# Patient Record
Sex: MALE | Race: WHITE | NOT HISPANIC OR LATINO | Employment: OTHER | URBAN - NONMETROPOLITAN AREA
[De-identification: names, ages, dates, MRNs, and addresses within clinical notes are randomized per-mention and may not be internally consistent; named-entity substitution may affect disease eponyms.]

---

## 2023-01-28 PROBLEM — I25.10 CAD (CORONARY ARTERY DISEASE): Status: ACTIVE | Noted: 2023-01-28

## 2023-01-28 PROBLEM — I10 BENIGN HYPERTENSION: Status: ACTIVE | Noted: 2023-01-28

## 2023-01-28 PROBLEM — N52.9 ERECTILE DYSFUNCTION: Status: ACTIVE | Noted: 2023-01-28

## 2023-01-28 PROBLEM — E78.00 HYPERCHOLESTEROLEMIA: Status: ACTIVE | Noted: 2023-01-28

## 2023-01-28 PROBLEM — M26.642 ARTHRITIS OF LEFT TEMPOROMANDIBULAR JOINT: Status: ACTIVE | Noted: 2023-01-28

## 2023-01-28 PROBLEM — F32.A DEPRESSION: Status: ACTIVE | Noted: 2023-01-28

## 2023-01-28 PROBLEM — E55.9 VITAMIN D DEFICIENCY: Status: ACTIVE | Noted: 2023-01-28

## 2023-01-28 PROBLEM — F32.5 DEPRESSION, MAJOR, IN REMISSION (CMS-HCC): Status: ACTIVE | Noted: 2023-01-28

## 2023-01-28 PROBLEM — E03.9 HYPOTHYROID: Status: ACTIVE | Noted: 2023-01-28

## 2023-01-28 PROBLEM — K13.0 LIP LESION: Status: ACTIVE | Noted: 2023-01-28

## 2023-01-28 PROBLEM — R55 VASOVAGAL EPISODE: Status: ACTIVE | Noted: 2023-01-28

## 2023-01-28 PROBLEM — L40.50 PSORIATIC ARTHROPATHY (MULTI): Status: ACTIVE | Noted: 2023-01-28

## 2023-01-28 PROBLEM — D69.1 ABNORMAL PLATELETS (MULTI): Status: ACTIVE | Noted: 2023-01-28

## 2023-01-28 PROBLEM — S61.419A HAND LACERATION: Status: ACTIVE | Noted: 2023-01-28

## 2023-01-28 PROBLEM — D64.9 ANEMIA: Status: ACTIVE | Noted: 2023-01-28

## 2023-01-28 RX ORDER — LEVOTHYROXINE SODIUM 100 UG/1
100 TABLET ORAL DAILY
COMMUNITY
Start: 2018-07-16 | End: 2023-09-28 | Stop reason: SDUPTHER

## 2023-01-28 RX ORDER — IXEKIZUMAB 80 MG/ML
80 INJECTION, SOLUTION SUBCUTANEOUS
COMMUNITY
Start: 2018-05-29

## 2023-01-28 RX ORDER — CLOPIDOGREL BISULFATE 75 MG/1
75 TABLET ORAL DAILY
COMMUNITY
Start: 2014-01-21 | End: 2024-04-26 | Stop reason: SDUPTHER

## 2023-01-28 RX ORDER — METOPROLOL SUCCINATE 25 MG/1
25 TABLET, EXTENDED RELEASE ORAL DAILY
COMMUNITY
Start: 2014-01-21

## 2023-01-28 RX ORDER — LISINOPRIL 2.5 MG/1
2.5 TABLET ORAL DAILY
COMMUNITY
Start: 2016-06-10 | End: 2024-04-29 | Stop reason: HOSPADM

## 2023-01-28 RX ORDER — ASPIRIN 81 MG/1
81 TABLET ORAL DAILY
COMMUNITY
Start: 2014-01-21

## 2023-01-28 RX ORDER — ROSUVASTATIN CALCIUM 20 MG/1
20 TABLET, COATED ORAL DAILY
COMMUNITY
Start: 2014-01-21 | End: 2024-05-02 | Stop reason: ALTCHOICE

## 2023-01-28 RX ORDER — TADALAFIL 20 MG/1
20 TABLET ORAL
COMMUNITY
Start: 2020-02-12 | End: 2023-06-13 | Stop reason: ALTCHOICE

## 2023-01-28 RX ORDER — CITALOPRAM 20 MG/1
20 TABLET, FILM COATED ORAL DAILY
COMMUNITY
Start: 2014-01-15 | End: 2023-08-28

## 2023-01-30 PROBLEM — E66.3 OVERWEIGHT WITH BODY MASS INDEX (BMI) OF 27 TO 27.9 IN ADULT: Status: ACTIVE | Noted: 2023-01-30

## 2023-03-13 ENCOUNTER — OFFICE VISIT (OUTPATIENT)
Dept: PRIMARY CARE | Facility: CLINIC | Age: 68
End: 2023-03-13
Payer: MEDICARE

## 2023-03-13 VITALS
WEIGHT: 175 LBS | HEART RATE: 65 BPM | DIASTOLIC BLOOD PRESSURE: 78 MMHG | BODY MASS INDEX: 25.05 KG/M2 | TEMPERATURE: 97.1 F | HEIGHT: 70 IN | OXYGEN SATURATION: 98 % | SYSTOLIC BLOOD PRESSURE: 124 MMHG

## 2023-03-13 DIAGNOSIS — D69.1 ABNORMAL PLATELETS (MULTI): Chronic | ICD-10-CM

## 2023-03-13 DIAGNOSIS — F32.5 DEPRESSION, MAJOR, IN REMISSION (CMS-HCC): ICD-10-CM

## 2023-03-13 DIAGNOSIS — I25.10 CORONARY ARTERY DISEASE INVOLVING NATIVE HEART, UNSPECIFIED VESSEL OR LESION TYPE, UNSPECIFIED WHETHER ANGINA PRESENT: Chronic | ICD-10-CM

## 2023-03-13 DIAGNOSIS — E03.9 ACQUIRED HYPOTHYROIDISM: ICD-10-CM

## 2023-03-13 DIAGNOSIS — E55.9 VITAMIN D DEFICIENCY: ICD-10-CM

## 2023-03-13 DIAGNOSIS — Z12.11 SCREEN FOR COLON CANCER: ICD-10-CM

## 2023-03-13 DIAGNOSIS — I10 BENIGN HYPERTENSION: Chronic | ICD-10-CM

## 2023-03-13 DIAGNOSIS — Z00.00 MEDICARE ANNUAL WELLNESS VISIT, SUBSEQUENT: Primary | Chronic | ICD-10-CM

## 2023-03-13 DIAGNOSIS — Z00.00 ROUTINE GENERAL MEDICAL EXAMINATION AT HEALTH CARE FACILITY: Chronic | ICD-10-CM

## 2023-03-13 PROBLEM — F32.A DEPRESSION: Status: RESOLVED | Noted: 2023-01-28 | Resolved: 2023-03-13

## 2023-03-13 PROBLEM — E78.00 HYPERCHOLESTEROLEMIA: Status: RESOLVED | Noted: 2023-01-28 | Resolved: 2023-03-13

## 2023-03-13 PROCEDURE — 3078F DIAST BP <80 MM HG: CPT | Performed by: INTERNAL MEDICINE

## 2023-03-13 PROCEDURE — 3074F SYST BP LT 130 MM HG: CPT | Performed by: INTERNAL MEDICINE

## 2023-03-13 PROCEDURE — 1036F TOBACCO NON-USER: CPT | Performed by: INTERNAL MEDICINE

## 2023-03-13 PROCEDURE — 99214 OFFICE O/P EST MOD 30 MIN: CPT | Performed by: INTERNAL MEDICINE

## 2023-03-13 PROCEDURE — 1170F FXNL STATUS ASSESSED: CPT | Performed by: INTERNAL MEDICINE

## 2023-03-13 PROCEDURE — G0439 PPPS, SUBSEQ VISIT: HCPCS | Performed by: INTERNAL MEDICINE

## 2023-03-13 PROCEDURE — G0444 DEPRESSION SCREEN ANNUAL: HCPCS | Performed by: INTERNAL MEDICINE

## 2023-03-13 ASSESSMENT — LIFESTYLE VARIABLES
HAS A RELATIVE, FRIEND, DOCTOR, OR ANOTHER HEALTH PROFESSIONAL EXPRESSED CONCERN ABOUT YOUR DRINKING OR SUGGESTED YOU CUT DOWN: NO
HOW OFTEN DURING THE LAST YEAR HAVE YOU HAD A FEELING OF GUILT OR REMORSE AFTER DRINKING: NEVER
HOW OFTEN DURING THE LAST YEAR HAVE YOU BEEN UNABLE TO REMEMBER WHAT HAPPENED THE NIGHT BEFORE BECAUSE YOU HAD BEEN DRINKING: NEVER
HOW OFTEN DURING THE LAST YEAR HAVE YOU HAD A FEELING OF GUILT OR REMORSE AFTER DRINKING: NEVER
HOW MANY STANDARD DRINKS CONTAINING ALCOHOL DO YOU HAVE ON A TYPICAL DAY: 1 OR 2
HOW OFTEN DURING THE LAST YEAR HAVE YOU FAILED TO DO WHAT WAS NORMALLY EXPECTED FROM YOU BECAUSE OF DRINKING: NEVER
AUDIT TOTAL SCORE: 2
HAVE YOU OR SOMEONE ELSE BEEN INJURED AS A RESULT OF YOUR DRINKING: NO
HAVE YOU OR SOMEONE ELSE BEEN INJURED AS A RESULT OF YOUR DRINKING: NO
HOW OFTEN DURING THE LAST YEAR HAVE YOU FOUND THAT YOU WERE NOT ABLE TO STOP DRINKING ONCE YOU HAD STARTED: NEVER
HOW OFTEN DURING THE LAST YEAR HAVE YOU BEEN UNABLE TO REMEMBER WHAT HAPPENED THE NIGHT BEFORE BECAUSE YOU HAD BEEN DRINKING: NEVER
HOW MANY STANDARD DRINKS CONTAINING ALCOHOL DO YOU HAVE ON A TYPICAL DAY: 1 OR 2
HOW OFTEN DO YOU HAVE SIX OR MORE DRINKS ON ONE OCCASION: NEVER
HOW OFTEN DO YOU HAVE A DRINK CONTAINING ALCOHOL: 2-4 TIMES A MONTH
HOW OFTEN DURING THE LAST YEAR HAVE YOU FAILED TO DO WHAT WAS NORMALLY EXPECTED FROM YOU BECAUSE OF DRINKING: NEVER
HOW OFTEN DURING THE LAST YEAR HAVE YOU NEEDED AN ALCOHOLIC DRINK FIRST THING IN THE MORNING TO GET YOURSELF GOING AFTER A NIGHT OF HEAVY DRINKING: NEVER
HOW OFTEN DO YOU HAVE A DRINK CONTAINING ALCOHOL: 2-4 TIMES A MONTH
HAS A RELATIVE, FRIEND, DOCTOR, OR ANOTHER HEALTH PROFESSIONAL EXPRESSED CONCERN ABOUT YOUR DRINKING OR SUGGESTED YOU CUT DOWN: NO
AUDIT-C TOTAL SCORE: 2
SKIP TO QUESTIONS 9-10: 1
HOW OFTEN DURING THE LAST YEAR HAVE YOU FOUND THAT YOU WERE NOT ABLE TO STOP DRINKING ONCE YOU HAD STARTED: NEVER
HOW OFTEN DO YOU HAVE SIX OR MORE DRINKS ON ONE OCCASION: NEVER
SKIP TO QUESTIONS 9-10: 1
AUDIT-C TOTAL SCORE: 2
HOW OFTEN DURING THE LAST YEAR HAVE YOU NEEDED AN ALCOHOLIC DRINK FIRST THING IN THE MORNING TO GET YOURSELF GOING AFTER A NIGHT OF HEAVY DRINKING: NEVER
AUDIT TOTAL SCORE: 2

## 2023-03-13 ASSESSMENT — ACTIVITIES OF DAILY LIVING (ADL)
TAKING_MEDICATION: INDEPENDENT
DRESSING: INDEPENDENT
BATHING: INDEPENDENT
DOING_HOUSEWORK: INDEPENDENT
MANAGING_FINANCES: INDEPENDENT
GROCERY_SHOPPING: INDEPENDENT

## 2023-03-13 ASSESSMENT — PATIENT HEALTH QUESTIONNAIRE - PHQ9
SUM OF ALL RESPONSES TO PHQ9 QUESTIONS 1 AND 2: 0
1. LITTLE INTEREST OR PLEASURE IN DOING THINGS: NOT AT ALL
2. FEELING DOWN, DEPRESSED OR HOPELESS: NOT AT ALL

## 2023-03-13 ASSESSMENT — ENCOUNTER SYMPTOMS
OCCASIONAL FEELINGS OF UNSTEADINESS: 0
LOSS OF SENSATION IN FEET: 0
DEPRESSION: 0

## 2023-03-13 NOTE — PROGRESS NOTES
"Subjective   Reason for Visit: Aamir Grossman is an 67 y.o. male here for a Medicare Wellness visit.               I spent 15 minutes obtaining and discussing depression screening using PHQ-2 questions with results documented in the chart.  -        Patient Self Assessment of Health Status  Patient Self Assessment: Excellent    Nutrition and Exercise  Current Diet: Well Balanced Diet  Adequate Fluid Intake: Yes  Caffeine: Yes  Exercise Frequency: Regularly    Functional Ability/Level of Safety  Cognitive Impairment Observed: No cognitive impairment observed  Cognitive Impairment Reported: No cognitive impairment reported by patient or family    Home Safety Risk Factors: None    Patient Care Team:  Lenora Cobb MD as PCP - General  Len[facility] Cobb MD [facility] PCP - [facility]     Review of Systems    Objective   Vitals:  /78   Pulse 65   Temp 36.2 °C (97.1 °F)   Ht 1.778 m (5' 10\")   Wt 79.4 kg (175 lb)   SpO2 98%   BMI 25.11 kg/m²       Physical Exam    Assessment/Plan   Problem List Items Addressed This Visit          Circulatory    Benign hypertension    Current Assessment & Plan     Controlled , continue current meds           CAD (coronary artery disease)    Current Assessment & Plan     Controlled , continue current meds              Endocrine/Metabolic    Acquired hypothyroidism    Vitamin D deficiency    Current Assessment & Plan     Controlled , continue current meds              Hematologic    Abnormal platelets (CMS/HCC)    Current Assessment & Plan     Controlled no change continue monitoring            Other    Depression, major, in remission (CMS/HCC)    Screen for colon cancer - Primary    Current Assessment & Plan     ordered         Relevant Orders    Referral to General Surgery     Other Visit Diagnoses       Routine general medical examination at health care facility  (Chronic)       Relevant Orders    1 Year Follow Up In Primary Care - Wellness Exam         spent 15 " minutes obtaining and discussing depression screening using PHQ-2 questions with results documented in the chart.  -

## 2023-06-13 ENCOUNTER — OFFICE VISIT (OUTPATIENT)
Dept: PRIMARY CARE | Facility: CLINIC | Age: 68
End: 2023-06-13
Payer: MEDICARE

## 2023-06-13 VITALS
WEIGHT: 172.8 LBS | TEMPERATURE: 97.2 F | HEART RATE: 51 BPM | SYSTOLIC BLOOD PRESSURE: 124 MMHG | BODY MASS INDEX: 24.79 KG/M2 | OXYGEN SATURATION: 97 % | DIASTOLIC BLOOD PRESSURE: 72 MMHG

## 2023-06-13 DIAGNOSIS — E03.9 ACQUIRED HYPOTHYROIDISM: ICD-10-CM

## 2023-06-13 DIAGNOSIS — E55.9 VITAMIN D DEFICIENCY, UNSPECIFIED: ICD-10-CM

## 2023-06-13 DIAGNOSIS — E53.8 VITAMIN B12 DEFICIENCY: ICD-10-CM

## 2023-06-13 DIAGNOSIS — L40.50 PSORIATIC ARTHROPATHY (MULTI): ICD-10-CM

## 2023-06-13 DIAGNOSIS — R53.83 OTHER FATIGUE: ICD-10-CM

## 2023-06-13 DIAGNOSIS — I10 HYPERTENSION, UNSPECIFIED TYPE: ICD-10-CM

## 2023-06-13 DIAGNOSIS — M67.912 TENDINOPATHY OF LEFT ROTATOR CUFF: ICD-10-CM

## 2023-06-13 DIAGNOSIS — I10 BENIGN HYPERTENSION: Primary | ICD-10-CM

## 2023-06-13 DIAGNOSIS — Z79.899 HIGH RISK MEDICATION USE: ICD-10-CM

## 2023-06-13 DIAGNOSIS — Z12.5 SCREENING FOR PROSTATE CANCER: ICD-10-CM

## 2023-06-13 DIAGNOSIS — F32.5 DEPRESSION, MAJOR, IN REMISSION (CMS-HCC): ICD-10-CM

## 2023-06-13 DIAGNOSIS — E78.00 HYPERCHOLESTEREMIA: ICD-10-CM

## 2023-06-13 DIAGNOSIS — I25.10 CORONARY ARTERY DISEASE INVOLVING NATIVE HEART, UNSPECIFIED VESSEL OR LESION TYPE, UNSPECIFIED WHETHER ANGINA PRESENT: ICD-10-CM

## 2023-06-13 PROCEDURE — 99214 OFFICE O/P EST MOD 30 MIN: CPT | Performed by: INTERNAL MEDICINE

## 2023-06-13 PROCEDURE — 3078F DIAST BP <80 MM HG: CPT | Performed by: INTERNAL MEDICINE

## 2023-06-13 PROCEDURE — 3074F SYST BP LT 130 MM HG: CPT | Performed by: INTERNAL MEDICINE

## 2023-06-13 PROCEDURE — 1159F MED LIST DOCD IN RCRD: CPT | Performed by: INTERNAL MEDICINE

## 2023-06-13 PROCEDURE — 1036F TOBACCO NON-USER: CPT | Performed by: INTERNAL MEDICINE

## 2023-06-13 PROCEDURE — 1160F RVW MEDS BY RX/DR IN RCRD: CPT | Performed by: INTERNAL MEDICINE

## 2023-06-13 RX ORDER — AMOXICILLIN 500 MG/1
500 CAPSULE ORAL DAILY
COMMUNITY
Start: 2023-06-07 | End: 2023-06-21 | Stop reason: ALTCHOICE

## 2023-06-13 RX ORDER — CLOBETASOL PROPIONATE 0.5 MG/G
OINTMENT TOPICAL
COMMUNITY
Start: 2023-05-08

## 2023-06-13 ASSESSMENT — ENCOUNTER SYMPTOMS
DIZZINESS: 0
BLOOD IN STOOL: 0
WHEEZING: 0
PALPITATIONS: 0
FATIGUE: 0
UNEXPECTED WEIGHT CHANGE: 0
DIARRHEA: 0
SINUS PAIN: 0
FEVER: 0
ABDOMINAL PAIN: 0
HEADACHES: 0
DIFFICULTY URINATING: 0
BRUISES/BLEEDS EASILY: 0
HYPERTENSION: 1
PAIN: 1
SORE THROAT: 0
ARTHRALGIAS: 1
COUGH: 0

## 2023-06-13 NOTE — PROGRESS NOTES
Subjective   Patient ID: Aamir Grossman is a 67 y.o. male who presents for Hypertension, Hypothyroidism, Hyperlipidemia, and Pain (Left upper chest/shoulder region comes and goes ).    -History of coronary artery disease compensated no shortness of breath  - History of left shoulder rotator cuff surgery and chronic clavicular head dislocation now having left-sided pectoral pain radiating to the shoulder usually at rest no relation to exertion  Musculoskeletal pain patient reassured to continue monitoring follow-up Dr Lewis orthopedic surgery as recommended  - Hypercholesterolemia controlled  -Hypothyroid compensated follow-up thyroid function text results next appointment  - BPH symptoms are controlled to continue with current medication    Hypertension  Associated symptoms include chest pain (Left-sided pectoral pain at rest). Pertinent negatives include no headaches or palpitations.   Hyperlipidemia  Associated symptoms include chest pain (Left-sided pectoral pain at rest).   Pain  Associated symptoms include chest pain (Left-sided pectoral pain at rest). Pertinent negatives include no abdominal pain, diarrhea, fatigue, fever, headaches, rash or wheezing.          Review of Systems   Constitutional:  Negative for fatigue, fever and unexpected weight change.   HENT:  Negative for congestion, ear discharge, ear pain, mouth sores, sinus pain and sore throat.    Eyes:  Negative for visual disturbance.   Respiratory:  Negative for cough and wheezing.    Cardiovascular:  Positive for chest pain (Left-sided pectoral pain at rest). Negative for palpitations and leg swelling.   Gastrointestinal:  Negative for abdominal pain, blood in stool and diarrhea.   Genitourinary:  Negative for difficulty urinating.   Musculoskeletal:  Positive for arthralgias.   Skin:  Negative for rash.   Neurological:  Negative for dizziness and headaches.   Hematological:  Does not bruise/bleed easily.   Psychiatric/Behavioral:  Negative for  "behavioral problems.    All other systems reviewed and are negative.      Objective   No results found for: \"HGBA1C\"   /72   Pulse 51   Temp 36.2 °C (97.2 °F)   Wt 78.4 kg (172 lb 12.8 oz)   SpO2 97%   BMI 24.79 kg/m²   This SmartLink has not been configured with any valid records.     This SmartLink has not been configured with any valid records.     Lab Results   Component Value Date    WBC 5.1 10/25/2022    HGB 13.9 10/25/2022    HCT 41.3 10/25/2022     (L) 10/25/2022    CHOL 175 10/25/2022    TRIG 82 10/25/2022    HDL 71.0 10/25/2022    ALT 40 10/25/2022    AST 38 10/25/2022     10/25/2022    K 4.6 10/25/2022     10/25/2022    CREATININE 1.25 10/25/2022    BUN 16 10/25/2022    CO2 27 10/25/2022    TSH 3.06 10/25/2022    INR 1.0 03/01/2018     par   Physical Exam  Vitals and nursing note reviewed.   Constitutional:       Appearance: Normal appearance.   HENT:      Head: Normocephalic.      Nose: Nose normal.   Eyes:      Conjunctiva/sclera: Conjunctivae normal.      Pupils: Pupils are equal, round, and reactive to light.   Cardiovascular:      Rate and Rhythm: Regular rhythm.   Pulmonary:      Effort: Pulmonary effort is normal.      Breath sounds: Normal breath sounds.   Abdominal:      General: Abdomen is flat.      Palpations: Abdomen is soft.   Musculoskeletal:         General: Tenderness (Tenderness over the left pectoral muscle) and deformity (Left shoulder clavicular head this location chronic) present.      Cervical back: Neck supple.   Skin:     General: Skin is warm.   Neurological:      General: No focal deficit present.      Mental Status: He is oriented to person, place, and time.   Psychiatric:         Mood and Affect: Mood normal.         Assessment/Plan   Aamir was seen today for hypertension, hypothyroidism, hyperlipidemia and pain.  Diagnoses and all orders for this visit:  Benign hypertension (Primary)  Coronary artery disease involving native heart, unspecified " vessel or lesion type, unspecified whether angina present  Depression, major, in remission (CMS/HCC)  Acquired hypothyroidism  High risk medication use  -     CBC and Auto Differential; Future  Hypertension, unspecified type  -     Comprehensive Metabolic Panel; Future  Hypercholesteremia  -     Lipid Panel; Future  Screening for prostate cancer  -     Prostate Specific Antigen, Screen; Future  Other fatigue  -     TSH with reflex to Free T4 if abnormal; Future  Vitamin B12 deficiency  -     Vitamin B12; Future  Vitamin D deficiency, unspecified  -     Vitamin D, Total; Future  Tendinopathy of left rotator cuff  Psoriatic arthropathy (CMS/[facility])  Other orders  -     Follow Up In Primary Care; Future  -     Follow Up In Primary Care; Future   -History of coronary artery disease compensated no shortness of breath  - History of left shoulder rotator cuff surgery and chronic clavicular head dislocation now having left-sided pectoral pain radiating to the shoulder usually at rest no relation to exertion  Musculoskeletal pain patient reassured to continue monitoring follow-up Dr Lewis orthopedic surgery as recommended  - Hypercholesterolemia controlled  -Hypothyroid compensated follow-up thyroid function text results next appointment  - BPH symptoms are controlled to continue with current medication

## 2023-06-19 ENCOUNTER — TELEPHONE (OUTPATIENT)
Dept: PRIMARY CARE | Facility: CLINIC | Age: 68
End: 2023-06-19
Payer: MEDICARE

## 2023-06-19 RX ORDER — SUCRALFATE 1 G/1
TABLET ORAL
COMMUNITY
Start: 2023-06-16 | End: 2024-02-19 | Stop reason: ALTCHOICE

## 2023-06-19 RX ORDER — TRAMADOL HYDROCHLORIDE 50 MG/1
TABLET ORAL
COMMUNITY
Start: 2023-06-16 | End: 2023-11-16 | Stop reason: WASHOUT

## 2023-06-19 NOTE — TELEPHONE ENCOUNTER
Transition of Care    [facility]  Discharge diagnosis: Chest Pain  Discharged to: Home  Discharge date: 6/16/23  Initial Call date: 6/19/23  Spoke with patient/caregiver: Patient                                                                     Do you need assistance  visits prior to your PCP visit: No  Home health care ordered: No  Have you been contacted by home care and have a start of care date: No  Are you taking medications as prescribed at discharge: Yes    Referral to [facility] Pharmacist: No  Patient advised to bring all medications to PCP follow-up appointment.  Patient advised to follow discharge instructions until provider follow-up.  TCM visit date: 6/21/23  TCM provider visit with: Lenora Cobb MD

## 2023-06-21 ENCOUNTER — OFFICE VISIT (OUTPATIENT)
Dept: PRIMARY CARE | Facility: CLINIC | Age: 68
End: 2023-06-21
Payer: MEDICARE

## 2023-06-21 VITALS
DIASTOLIC BLOOD PRESSURE: 80 MMHG | HEART RATE: 71 BPM | OXYGEN SATURATION: 99 % | SYSTOLIC BLOOD PRESSURE: 130 MMHG | BODY MASS INDEX: 24.48 KG/M2 | TEMPERATURE: 97.2 F | WEIGHT: 170.6 LBS

## 2023-06-21 DIAGNOSIS — K21.9 GASTROESOPHAGEAL REFLUX DISEASE WITHOUT ESOPHAGITIS: Primary | ICD-10-CM

## 2023-06-21 DIAGNOSIS — E03.9 ACQUIRED HYPOTHYROIDISM: ICD-10-CM

## 2023-06-21 DIAGNOSIS — I20.89 OTHER FORMS OF ANGINA PECTORIS (CMS-HCC): ICD-10-CM

## 2023-06-21 DIAGNOSIS — I10 BENIGN HYPERTENSION: ICD-10-CM

## 2023-06-21 DIAGNOSIS — I25.10 CORONARY ARTERY DISEASE INVOLVING NATIVE HEART, UNSPECIFIED VESSEL OR LESION TYPE, UNSPECIFIED WHETHER ANGINA PRESENT: ICD-10-CM

## 2023-06-21 PROBLEM — S61.419A HAND LACERATION: Status: RESOLVED | Noted: 2023-01-28 | Resolved: 2023-06-21

## 2023-06-21 PROBLEM — E66.3 OVERWEIGHT WITH BODY MASS INDEX (BMI) OF 27 TO 27.9 IN ADULT: Status: RESOLVED | Noted: 2023-01-30 | Resolved: 2023-06-21

## 2023-06-21 PROBLEM — K13.0 LIP LESION: Status: RESOLVED | Noted: 2023-01-28 | Resolved: 2023-06-21

## 2023-06-21 PROCEDURE — 3075F SYST BP GE 130 - 139MM HG: CPT | Performed by: INTERNAL MEDICINE

## 2023-06-21 PROCEDURE — 1159F MED LIST DOCD IN RCRD: CPT | Performed by: INTERNAL MEDICINE

## 2023-06-21 PROCEDURE — 1036F TOBACCO NON-USER: CPT | Performed by: INTERNAL MEDICINE

## 2023-06-21 PROCEDURE — 1160F RVW MEDS BY RX/DR IN RCRD: CPT | Performed by: INTERNAL MEDICINE

## 2023-06-21 PROCEDURE — 3079F DIAST BP 80-89 MM HG: CPT | Performed by: INTERNAL MEDICINE

## 2023-06-21 PROCEDURE — 99496 TRANSJ CARE MGMT HIGH F2F 7D: CPT | Performed by: INTERNAL MEDICINE

## 2023-06-21 RX ORDER — CEPHALEXIN 500 MG/1
CAPSULE ORAL
COMMUNITY
Start: 2023-06-20 | End: 2023-09-12 | Stop reason: ALTCHOICE

## 2023-06-21 ASSESSMENT — ENCOUNTER SYMPTOMS
SINUS PAIN: 0
HEADACHES: 0
ARTHRALGIAS: 0
DIZZINESS: 0
FEVER: 0
BRUISES/BLEEDS EASILY: 0
WHEEZING: 0
DIFFICULTY URINATING: 0
BLOOD IN STOOL: 0
DIARRHEA: 0
COUGH: 0
PALPITATIONS: 0
SORE THROAT: 0
UNEXPECTED WEIGHT CHANGE: 0
FATIGUE: 0
ABDOMINAL PAIN: 0

## 2023-06-21 NOTE — PROGRESS NOTES
Subjective   Patient ID: Aamir Grossman is a 67 y.o. male who presents for Hospital Follow-up (TCM, hospital follow up for hypertensive heart disease with heart failure, dental disease).    Transition of care  Patient admission history and physical, hospital course, medications, verified and reviewed  PatiTransition of Care     [facility]  Discharge diagnosis: Chest Pain  Discharged to: Home  Discharge date: 6/16/23  Initial Call date: 6/19/23  Spoke with patient/caregiver: Patient                                                                      Do you need assistance  visits prior to your PCP visit: No  Home health care ordered: No  Have you been contacted by home care and have a start of care date: No  Are you taking medications as prescribed at discharge: Yes     Referral to [facility] Pharmacist: No  Patient advised to bring all medications to PCP follow-up appointment.  Patient advised to follow discharge instructions until provider follow-up.  TCM visit date: 6/21/23  TCM provider visit with: Lenora Cobb MD        ent contacted after discharge, medications verified comes today for follow-up  And admitted for chest pain cardiac work-up negative evaluated by cardiology asymptomatic atypical chest pain possible due to reflux disease patient consuming large amount of Motrin  -Reflux disease need to continue Protonix as recommended avoid any NSAIDs  -Chronic tooth pain patient scheduled to see his dentist for tooth extraction patient need to contact cardiology regarding Plavix may need to stop 1 week prior to procedure  -History of coronary artery disease compensated no shortness of breath compensated  - History of left shoulder rotator cuff surgery and chronic clavicular head dislocation now having left-sided pectoral pain radiating to the shoulder usually at rest no relation to exertion  Musculoskeletal pain patient reassured to continue monitoring follow-up Dr Lewis  "orthopedic surgery as recommended  - Hypercholesterolemia controlled  -Hypothyroid compensated follow-up thyroid function text results next appointment  - BPH symptoms are controlled to continue with current medication.  Follow-up as needed or as scheduled                   Review of Systems   Constitutional:  Negative for fatigue, fever and unexpected weight change.   HENT:  Negative for congestion, ear discharge, ear pain, mouth sores, sinus pain and sore throat.    Eyes:  Negative for visual disturbance.   Respiratory:  Negative for cough and wheezing.    Cardiovascular:  Negative for chest pain, palpitations and leg swelling.   Gastrointestinal:  Negative for abdominal pain, blood in stool and diarrhea.   Genitourinary:  Negative for difficulty urinating.   Musculoskeletal:  Negative for arthralgias.   Skin:  Negative for rash.   Neurological:  Negative for dizziness and headaches.   Hematological:  Does not bruise/bleed easily.   Psychiatric/Behavioral:  Negative for behavioral problems.    All other systems reviewed and are negative.      Objective   No results found for: \"HGBA1C\"   /80   Pulse 71   Temp 36.2 °C (97.2 °F)   Wt 77.4 kg (170 lb 9.6 oz)   SpO2 99%   BMI 24.48 kg/m²   Lab Results   Component Value Date    WBC 5.1 06/16/2023    HGB 13.0 (L) 06/16/2023    HCT 38.0 (L) 06/16/2023     (L) 06/16/2023    CHOL 164 06/16/2023    TRIG 211 (H) 06/16/2023    HDL 66.9 06/16/2023    ALT 23 06/15/2023    AST 29 06/15/2023     06/16/2023    K 4.4 06/16/2023     06/16/2023    CREATININE 1.00 06/16/2023    BUN 19 06/16/2023    CO2 25 06/16/2023    TSH 3.06 10/25/2022    INR 1.0 03/01/2018     par   Physical Exam  Vitals and nursing note reviewed.   Constitutional:       Appearance: Normal appearance.   HENT:      Head: Normocephalic.      Nose: Nose normal.   Eyes:      Conjunctiva/sclera: Conjunctivae normal.      Pupils: Pupils are equal, round, and reactive to light. "   Cardiovascular:      Rate and Rhythm: Regular rhythm.   Pulmonary:      Effort: Pulmonary effort is normal.      Breath sounds: Normal breath sounds.   Abdominal:      General: Abdomen is flat.      Palpations: Abdomen is soft.   Musculoskeletal:      Cervical back: Neck supple.   Skin:     General: Skin is warm.   Neurological:      General: No focal deficit present.      Mental Status: He is oriented to person, place, and time.   Psychiatric:         Mood and Affect: Mood normal.       Assessment/Plan   Aamir was seen today for hospital follow-up.  Diagnoses and all orders for this visit:  Gastroesophageal reflux disease without esophagitis (Primary)  Other forms of angina pectoris (CMS/HCC)  Coronary artery disease involving native heart, unspecified vessel or lesion type, unspecified whether angina present  Benign hypertension  Acquired hypothyroidism   Transition of care  Patient admission history and physical, hospital course, medications, verified and reviewed  PatiTransition of Care     [facility]  Discharge diagnosis: Chest Pain  Discharged to: Home  Discharge date: 6/16/23  Initial Call date: 6/19/23  Spoke with patient/caregiver: Patient                                                                      Do you need assistance  visits prior to your PCP visit: No  Home health care ordered: No  Have you been contacted by home care and have a start of care date: No  Are you taking medications as prescribed at discharge: Yes     Referral to [facility] Pharmacist: No  Patient advised to bring all medications to PCP follow-up appointment.  Patient advised to follow discharge instructions until provider follow-up.  TCM visit date: 6/21/23  TCM provider visit with: Lenora Cobb MD        ent contacted after discharge, medications verified comes today for follow-up  And admitted for chest pain cardiac work-up negative evaluated by cardiology asymptomatic atypical chest pain  possible due to reflux disease patient consuming large amount of Motrin  -Reflux disease need to continue Protonix as recommended avoid any NSAIDs  -Chronic tooth pain patient scheduled to see his dentist for tooth extraction patient need to contact cardiology regarding Plavix may need to stop 1 week prior to procedure  -History of coronary artery disease compensated no shortness of breath compensated  - History of left shoulder rotator cuff surgery and chronic clavicular head dislocation now having left-sided pectoral pain radiating to the shoulder usually at rest no relation to exertion  Musculoskeletal pain patient reassured to continue monitoring follow-up Dr Lewis orthopedic surgery as recommended  - Hypercholesterolemia controlled  -Hypothyroid compensated follow-up thyroid function text results next appointment  - BPH symptoms are controlled to continue with current medication.  Follow-up as needed or as scheduled

## 2023-08-28 DIAGNOSIS — F32.5 DEPRESSION, MAJOR, IN REMISSION (CMS-HCC): Primary | ICD-10-CM

## 2023-08-28 RX ORDER — CITALOPRAM 20 MG/1
20 TABLET, FILM COATED ORAL DAILY
Qty: 90 TABLET | Refills: 1 | Status: SHIPPED | OUTPATIENT
Start: 2023-08-28 | End: 2023-09-28 | Stop reason: SDUPTHER

## 2023-09-12 ENCOUNTER — OFFICE VISIT (OUTPATIENT)
Dept: PRIMARY CARE | Facility: CLINIC | Age: 68
End: 2023-09-12
Payer: MEDICARE

## 2023-09-12 VITALS
DIASTOLIC BLOOD PRESSURE: 76 MMHG | SYSTOLIC BLOOD PRESSURE: 124 MMHG | BODY MASS INDEX: 24.59 KG/M2 | HEART RATE: 59 BPM | OXYGEN SATURATION: 95 % | WEIGHT: 166.4 LBS | TEMPERATURE: 97.2 F

## 2023-09-12 DIAGNOSIS — I10 HYPERTENSION, UNSPECIFIED TYPE: ICD-10-CM

## 2023-09-12 DIAGNOSIS — Z23 FLU VACCINE NEED: ICD-10-CM

## 2023-09-12 DIAGNOSIS — I10 BENIGN HYPERTENSION: ICD-10-CM

## 2023-09-12 DIAGNOSIS — F32.5 DEPRESSION, MAJOR, IN REMISSION (CMS-HCC): ICD-10-CM

## 2023-09-12 DIAGNOSIS — M53.3 COCCYGEAL PAIN: Primary | ICD-10-CM

## 2023-09-12 DIAGNOSIS — E78.00 HYPERCHOLESTEREMIA: ICD-10-CM

## 2023-09-12 PROCEDURE — 3078F DIAST BP <80 MM HG: CPT | Performed by: INTERNAL MEDICINE

## 2023-09-12 PROCEDURE — 1036F TOBACCO NON-USER: CPT | Performed by: INTERNAL MEDICINE

## 2023-09-12 PROCEDURE — 99214 OFFICE O/P EST MOD 30 MIN: CPT | Performed by: INTERNAL MEDICINE

## 2023-09-12 PROCEDURE — 1159F MED LIST DOCD IN RCRD: CPT | Performed by: INTERNAL MEDICINE

## 2023-09-12 PROCEDURE — 90662 IIV NO PRSV INCREASED AG IM: CPT | Performed by: INTERNAL MEDICINE

## 2023-09-12 PROCEDURE — 1160F RVW MEDS BY RX/DR IN RCRD: CPT | Performed by: INTERNAL MEDICINE

## 2023-09-12 PROCEDURE — 3074F SYST BP LT 130 MM HG: CPT | Performed by: INTERNAL MEDICINE

## 2023-09-12 PROCEDURE — G0008 ADMIN INFLUENZA VIRUS VAC: HCPCS | Performed by: INTERNAL MEDICINE

## 2023-09-12 ASSESSMENT — ENCOUNTER SYMPTOMS
DIARRHEA: 0
FEVER: 0
ABDOMINAL PAIN: 0
BACK PAIN: 1
FATIGUE: 0
ARTHRALGIAS: 0
SINUS PAIN: 0
HYPERTENSION: 1
BLOOD IN STOOL: 0
HEADACHES: 0
PALPITATIONS: 0
DIZZINESS: 0
UNEXPECTED WEIGHT CHANGE: 0
WHEEZING: 0
DIFFICULTY URINATING: 0
BRUISES/BLEEDS EASILY: 0
SORE THROAT: 0
COUGH: 0

## 2023-09-12 NOTE — PROGRESS NOTES
Subjective   Patient ID: Aamir Grossman is a 67 y.o. male who presents for Hypertension, Coronary Artery Disease, Fall (A few months ago, bruised tailbone but better now), and Flu Vaccine (given).    - Coccygeal pain patient slipped and fell down his buttocks 2 months ago symptoms now improving getting better patient comes about conservative measures Tylenol as needed  No signs of fractures  -Reflux disease need to continue Protonix as recommended avoid any NSAIDs no abdominal pain  -History of coronary artery disease compensated no shortness of breath compensated continue with aspirin daily  - History of left shoulder rotator cuff surgery and chronic clavicular head dislocation now having left-sided pectoral pain radiating to the shoulder usually at rest no relation to exertion stable  - Hypercholesterolemia controlled continue with low-fat diet exercise  -Hypothyroid compensated follow-up thyroid function  - BPH compensated  - Flu vaccine today  Follow-up 6 months     Hypertension  Pertinent negatives include no chest pain, headaches or palpitations.   Coronary Artery Disease  Pertinent negatives include no chest pain, dizziness, leg swelling or palpitations.   Fall  Pertinent negatives include no abdominal pain, fever or headaches.          Review of Systems   Constitutional:  Negative for fatigue, fever and unexpected weight change.   HENT:  Negative for congestion, ear discharge, ear pain, mouth sores, sinus pain and sore throat.    Eyes:  Negative for visual disturbance.   Respiratory:  Negative for cough and wheezing.    Cardiovascular:  Negative for chest pain, palpitations and leg swelling.   Gastrointestinal:  Negative for abdominal pain, blood in stool and diarrhea.   Genitourinary:  Negative for difficulty urinating.   Musculoskeletal:  Positive for back pain. Negative for arthralgias.   Skin:  Negative for rash.   Neurological:  Negative for dizziness and headaches.   Hematological:  Does not  "bruise/bleed easily.   Psychiatric/Behavioral:  Negative for behavioral problems.    All other systems reviewed and are negative.      Objective   No results found for: \"HGBA1C\"   /76   Pulse 59   Temp 36.2 °C (97.2 °F)   Wt 75.5 kg (166 lb 6.4 oz)   SpO2 95%   BMI 24.59 kg/m²   Lab Results   Component Value Date    WBC 5.1 06/16/2023    HGB 13.0 (L) 06/16/2023    HCT 38.0 (L) 06/16/2023     (L) 06/16/2023    CHOL 164 06/16/2023    TRIG 211 (H) 06/16/2023    HDL 66.9 06/16/2023    ALT 23 06/15/2023    AST 29 06/15/2023     06/16/2023    K 4.4 06/16/2023     06/16/2023    CREATININE 1.00 06/16/2023    BUN 19 06/16/2023    CO2 25 06/16/2023    TSH 3.06 10/25/2022    INR 1.0 03/01/2018     par   Physical Exam  Vitals and nursing note reviewed.   Constitutional:       Appearance: Normal appearance.   HENT:      Head: Normocephalic.      Nose: Nose normal.   Eyes:      Conjunctiva/sclera: Conjunctivae normal.      Pupils: Pupils are equal, round, and reactive to light.   Cardiovascular:      Rate and Rhythm: Regular rhythm.   Pulmonary:      Effort: Pulmonary effort is normal.      Breath sounds: Normal breath sounds.   Abdominal:      General: Abdomen is flat.      Palpations: Abdomen is soft.   Musculoskeletal:      Cervical back: Neck supple.   Skin:     General: Skin is warm.   Neurological:      General: No focal deficit present.      Mental Status: He is oriented to person, place, and time.   Psychiatric:         Mood and Affect: Mood normal.         Assessment/Plan   Aamir was seen today for hypertension, coronary artery disease, fall and flu vaccine.  Diagnoses and all orders for this visit:  Coccygeal pain (Primary)  Flu vaccine need  -     Flu vaccine, quadrivalent, high-dose, preservative free, age 65y+ (FLUZONE)  Benign hypertension  Hypercholesteremia  Depression, major, in remission (CMS/HCC)  Hypertension, unspecified type  Other orders  -     Follow Up In Primary Care  -     " Follow Up In Primary Care - Established; Future   - Coccygeal pain patient slipped and fell down his buttocks 2 months ago symptoms now improving getting better patient comes about conservative measures Tylenol as needed  No signs of fractures  -Reflux disease need to continue Protonix as recommended avoid any NSAIDs no abdominal pain  -History of coronary artery disease compensated no shortness of breath compensated continue with aspirin daily  - History of left shoulder rotator cuff surgery and chronic clavicular head dislocation now having left-sided pectoral pain radiating to the shoulder usually at rest no relation to exertion stable  - Hypercholesterolemia controlled continue with low-fat diet exercise  -Hypothyroid compensated follow-up thyroid function  - BPH compensated  - Flu vaccine today  Follow-up 6 months

## 2023-09-28 ENCOUNTER — OFFICE VISIT (OUTPATIENT)
Dept: PRIMARY CARE | Facility: CLINIC | Age: 68
End: 2023-09-28
Payer: MEDICARE

## 2023-09-28 VITALS
TEMPERATURE: 97.4 F | DIASTOLIC BLOOD PRESSURE: 68 MMHG | BODY MASS INDEX: 24.5 KG/M2 | HEART RATE: 96 BPM | SYSTOLIC BLOOD PRESSURE: 124 MMHG | OXYGEN SATURATION: 97 % | WEIGHT: 165.8 LBS

## 2023-09-28 DIAGNOSIS — R13.19 ESOPHAGEAL DYSPHAGIA: Primary | ICD-10-CM

## 2023-09-28 DIAGNOSIS — R63.4 WEIGHT LOSS: ICD-10-CM

## 2023-09-28 DIAGNOSIS — E03.9 ACQUIRED HYPOTHYROIDISM: ICD-10-CM

## 2023-09-28 DIAGNOSIS — F32.5 DEPRESSION, MAJOR, IN REMISSION (CMS-HCC): ICD-10-CM

## 2023-09-28 PROCEDURE — 1036F TOBACCO NON-USER: CPT | Performed by: INTERNAL MEDICINE

## 2023-09-28 PROCEDURE — 1159F MED LIST DOCD IN RCRD: CPT | Performed by: INTERNAL MEDICINE

## 2023-09-28 PROCEDURE — 1160F RVW MEDS BY RX/DR IN RCRD: CPT | Performed by: INTERNAL MEDICINE

## 2023-09-28 PROCEDURE — 3078F DIAST BP <80 MM HG: CPT | Performed by: INTERNAL MEDICINE

## 2023-09-28 PROCEDURE — 3074F SYST BP LT 130 MM HG: CPT | Performed by: INTERNAL MEDICINE

## 2023-09-28 PROCEDURE — 99214 OFFICE O/P EST MOD 30 MIN: CPT | Performed by: INTERNAL MEDICINE

## 2023-09-28 RX ORDER — CITALOPRAM 20 MG/1
20 TABLET, FILM COATED ORAL DAILY
Qty: 90 TABLET | Refills: 1 | Status: SHIPPED | OUTPATIENT
Start: 2023-09-28 | End: 2023-12-22 | Stop reason: SDUPTHER

## 2023-09-28 RX ORDER — LEVOTHYROXINE SODIUM 100 UG/1
100 TABLET ORAL DAILY
Qty: 90 TABLET | Refills: 1 | Status: SHIPPED | OUTPATIENT
Start: 2023-09-28 | End: 2024-03-18 | Stop reason: SDUPTHER

## 2023-09-28 ASSESSMENT — ENCOUNTER SYMPTOMS
SORE THROAT: 0
ABDOMINAL PAIN: 0
UNEXPECTED WEIGHT CHANGE: 1
HEADACHES: 0
BRUISES/BLEEDS EASILY: 0
BLOOD IN STOOL: 0
SINUS PAIN: 0
DIZZINESS: 0
DIFFICULTY URINATING: 0
DIARRHEA: 0
COUGH: 0
WHEEZING: 0
FATIGUE: 0
ARTHRALGIAS: 0
PALPITATIONS: 0
FEVER: 0

## 2023-09-28 NOTE — PROGRESS NOTES
"Subjective   Patient ID: Aamir Grossman is a 67 y.o. male who presents for GERD (Feels like lump in throat hard to swallow x 4 days), Anorexia, and Weight Loss.     - Weight loss patient lost about 20 pounds over 1 year  Patient feeling lump in his esophagus difficulty in swallowing occasionally  Underlying history of reflux disease not using Carafate as recommended  Drinks about half bottle of wine every day  - Patient comes about possible reflux disease esophageal atresia or tumor  Refer patient to general surgery  Need to resume sucralfate as recommended  Patient need to complete abstinence from alcohol  - Coccygeal pain improved  -History of coronary artery disease compensated no shortness of breath compensated continue with aspirin daily no chest pain  - Hypercholesterolemia controlled continue with low-fat diet exercise  -Hypothyroid compensated follow-up thyroid function  -BPH compensated  Follow-up as a scheduled    GERD  He reports no abdominal pain, no chest pain, no coughing, no sore throat or no wheezing. Pertinent negatives include no fatigue.          Review of Systems   Constitutional:  Positive for unexpected weight change. Negative for fatigue and fever.   HENT:  Negative for congestion, ear discharge, ear pain, mouth sores, sinus pain and sore throat.    Eyes:  Negative for visual disturbance.   Respiratory:  Negative for cough and wheezing.    Cardiovascular:  Negative for chest pain, palpitations and leg swelling.   Gastrointestinal:  Negative for abdominal pain, blood in stool and diarrhea.   Genitourinary:  Negative for difficulty urinating.   Musculoskeletal:  Negative for arthralgias.   Skin:  Negative for rash.   Neurological:  Negative for dizziness and headaches.   Hematological:  Does not bruise/bleed easily.   Psychiatric/Behavioral:  Negative for behavioral problems.    All other systems reviewed and are negative.      Objective   No results found for: \"HGBA1C\"   /68   Pulse 96  "  Temp 36.3 °C (97.4 °F)   Wt 75.2 kg (165 lb 12.8 oz)   SpO2 97%   BMI 24.50 kg/m²   Lab Results   Component Value Date    WBC 5.1 06/16/2023    HGB 13.0 (L) 06/16/2023    HCT 38.0 (L) 06/16/2023     (L) 06/16/2023    CHOL 164 06/16/2023    TRIG 211 (H) 06/16/2023    HDL 66.9 06/16/2023    ALT 23 06/15/2023    AST 29 06/15/2023     06/16/2023    K 4.4 06/16/2023     06/16/2023    CREATININE 1.00 06/16/2023    BUN 19 06/16/2023    CO2 25 06/16/2023    TSH 3.06 10/25/2022    INR 1.0 03/01/2018     par   Physical Exam  Vitals and nursing note reviewed.   Constitutional:       Appearance: Normal appearance.   HENT:      Head: Normocephalic.      Nose: Nose normal.   Eyes:      Conjunctiva/sclera: Conjunctivae normal.      Pupils: Pupils are equal, round, and reactive to light.   Cardiovascular:      Rate and Rhythm: Regular rhythm.   Pulmonary:      Effort: Pulmonary effort is normal.      Breath sounds: Normal breath sounds.   Abdominal:      General: Abdomen is flat.      Palpations: Abdomen is soft.   Musculoskeletal:      Cervical back: Neck supple.   Skin:     General: Skin is warm.   Neurological:      General: No focal deficit present.      Mental Status: He is oriented to person, place, and time.   Psychiatric:         Mood and Affect: Mood normal.         Assessment/Plan   Aamir was seen today for gerd, anorexia and weight loss.  Diagnoses and all orders for this visit:  Esophageal dysphagia (Primary)  -     Referral to General Surgery; Future  Depression, major, in remission (CMS/HCC)  -     citalopram (CeleXA) 20 mg tablet; Take 1 tablet (20 mg) by mouth once daily.  Acquired hypothyroidism  -     levothyroxine (Synthroid, Levoxyl) 100 mcg tablet; Take 1 tablet (100 mcg) by mouth once daily.  Weight loss  -     Referral to General Surgery; Future    - Weight loss patient lost about 20 pounds over 1 year  Patient feeling lump in his esophagus difficulty in swallowing  occasionally  Underlying history of reflux disease not using Carafate as recommended  Drinks about half bottle of wine every day  - Patient comes about possible reflux disease esophageal atresia or tumor  Refer patient to general surgery  Need to resume sucralfate as recommended  Patient need to complete abstinence from alcohol  - Coccygeal pain improved  -History of coronary artery disease compensated no shortness of breath compensated continue with aspirin daily no chest pain  - Hypercholesterolemia controlled continue with low-fat diet exercise  -Hypothyroid compensated follow-up thyroid function  -BPH compensated  Follow-up as a scheduled

## 2023-10-06 ENCOUNTER — OFFICE VISIT (OUTPATIENT)
Dept: SURGERY | Facility: CLINIC | Age: 68
End: 2023-10-06
Payer: MEDICARE

## 2023-10-06 VITALS
SYSTOLIC BLOOD PRESSURE: 140 MMHG | BODY MASS INDEX: 24.73 KG/M2 | DIASTOLIC BLOOD PRESSURE: 70 MMHG | WEIGHT: 167 LBS | TEMPERATURE: 98 F | HEART RATE: 80 BPM | HEIGHT: 69 IN

## 2023-10-06 DIAGNOSIS — R13.13 CRICOPHARYNGEAL DYSPHAGIA: ICD-10-CM

## 2023-10-06 DIAGNOSIS — R13.19 CERVICAL DYSPHAGIA: Primary | ICD-10-CM

## 2023-10-06 PROCEDURE — 3078F DIAST BP <80 MM HG: CPT | Performed by: SURGERY

## 2023-10-06 PROCEDURE — 1160F RVW MEDS BY RX/DR IN RCRD: CPT | Performed by: SURGERY

## 2023-10-06 PROCEDURE — 99204 OFFICE O/P NEW MOD 45 MIN: CPT | Performed by: SURGERY

## 2023-10-06 PROCEDURE — 3077F SYST BP >= 140 MM HG: CPT | Performed by: SURGERY

## 2023-10-06 PROCEDURE — 1159F MED LIST DOCD IN RCRD: CPT | Performed by: SURGERY

## 2023-10-06 PROCEDURE — 1036F TOBACCO NON-USER: CPT | Performed by: SURGERY

## 2023-10-06 RX ORDER — OMEPRAZOLE 40 MG/1
40 CAPSULE, DELAYED RELEASE ORAL
Qty: 30 CAPSULE | Refills: 3 | Status: SHIPPED | OUTPATIENT
Start: 2023-10-06 | End: 2024-10-05

## 2023-10-06 RX ORDER — OMEPRAZOLE 40 MG/1
40 CAPSULE, DELAYED RELEASE ORAL
Status: DISCONTINUED | OUTPATIENT
Start: 2023-10-07 | End: 2023-10-06

## 2023-10-06 NOTE — PATIENT INSTRUCTIONS
You appear to have cricopharyngeal dysphagia.  This may be related to your reflux.  I would suggest not taking any further night snacks.  This will certainly help to decrease the amount of reflux you are having at night.  I want you to start an oral proton pump inhibitor and take that just before breakfast every day.  I have sent that to your pharmacy.  We will follow-up with you in 6 weeks.  Hopefully her symptoms are improved.  If there is no improvement then we will recommend considering upper endoscopy for an esophagram.  Please call my office if you have any questions.

## 2023-10-06 NOTE — PROGRESS NOTES
Subjective   Patient ID: Aamir Grossman is a 67 y.o. male who presents for     HPI   Here for dysphagia.  Has difficulty in the upper part of his throat where he feels as if something is stuck.  Has heartburn.  Was recently hospital for chest pain.  Is intermittent is not always present.  Has had no weight loss.  Has never no odynophagia.  Has no real dysphagia to any solids.  Past Medical History:   Diagnosis Date    Encounter for immunization 09/20/2016    Influenza vaccination administered at current visit    Encounter for screening, unspecified 09/30/2015    Screening    Other specified anxiety disorders     Depression with anxiety    Personal history of diseases of the skin and subcutaneous tissue     History of psoriasis    Personal history of nicotine dependence 10/20/2014    Former smoker, stopped smoking in distant past    Personal history of other diseases of the circulatory system 06/10/2016    History of hypotension    Personal history of other diseases of the circulatory system 01/11/2017    History of hypertension    Personal history of other diseases of the circulatory system     Personal history of congestive heart failure    Personal history of other diseases of the circulatory system     Personal history of coronary atherosclerosis    Personal history of other diseases of the respiratory system 12/30/2015    History of paranasal sinus congestion    Personal history of other endocrine, nutritional and metabolic disease     History of hypothyroidism    Pure hypercholesterolemia, unspecified 10/25/2022    Hypercholesterolemia    Tendinopathy of left rotator cuff 06/13/2023        Current Outpatient Medications on File Prior to Visit   Medication Sig Dispense Refill    aspirin 81 mg EC tablet Take 1 tablet (81 mg) by mouth once daily.      citalopram (CeleXA) 20 mg tablet Take 1 tablet (20 mg) by mouth once daily. 90 tablet 1    clopidogrel (Plavix) 75 mg tablet Take 1 tablet (75 mg) by mouth once  daily.      ixekizumab (Taltz Syringe) 80 mg/mL injection Inject 1 mL (80 mg) under the skin.      levothyroxine (Synthroid, Levoxyl) 100 mcg tablet Take 1 tablet (100 mcg) by mouth once daily. 90 tablet 1    lisinopril 2.5 mg tablet Take 1 tablet (2.5 mg) by mouth once daily.      metoprolol succinate XL (Toprol-XL) 25 mg 24 hr tablet Take 1 tablet (25 mg) by mouth once daily.      rosuvastatin (Crestor) 20 mg tablet Take 1 tablet (20 mg) by mouth once daily.      sucralfate (Carafate) 1 gram tablet 1 tab(s) orally 3 times a day (before meals)      clobetasol (Temovate) 0.05 % ointment       traMADol (Ultram) 50 mg tablet 1 (ONE) tab(s) orally every 6 (SIX) hours, As needed, Pain FOUR -10       No current facility-administered medications on file prior to visit.        Review of Systems   All other systems reviewed and are negative.      Vitals:    10/06/23 1458   BP: 140/70   Pulse: 80   Temp: 36.7 °C (98 °F)        Objective     Physical Exam  Vitals reviewed.   Constitutional:       Appearance: Normal appearance.   HENT:      Head: Normocephalic.   Cardiovascular:      Rate and Rhythm: Normal rate and regular rhythm.      Heart sounds: Normal heart sounds.   Pulmonary:      Effort: Pulmonary effort is normal.      Breath sounds: Normal breath sounds.   Abdominal:      General: Abdomen is flat. There is no distension.      Palpations: Abdomen is soft. There is no mass.      Tenderness: There is no abdominal tenderness. There is no guarding.      Hernia: No hernia is present.   Genitourinary:     Testes: Normal.   Musculoskeletal:         General: Normal range of motion.      Cervical back: Normal range of motion.   Skin:     General: Skin is warm.   Neurological:      General: No focal deficit present.   Psychiatric:         Mood and Affect: Mood normal.         Problem List Items Addressed This Visit       Cervical dysphagia - Primary    Cricopharyngeal dysphagia        Assessment/Plan   Trial of proton pump  inhibitor.  No night snacks.  Future follow-up in 6 weeks.  If no improvement esophagram versus upper endoscopy.    Christian Boateng MD

## 2023-11-17 ENCOUNTER — OFFICE VISIT (OUTPATIENT)
Dept: SURGERY | Facility: CLINIC | Age: 68
End: 2023-11-17
Payer: MEDICARE

## 2023-11-17 VITALS
HEART RATE: 67 BPM | DIASTOLIC BLOOD PRESSURE: 72 MMHG | TEMPERATURE: 97 F | HEIGHT: 69 IN | WEIGHT: 168 LBS | SYSTOLIC BLOOD PRESSURE: 128 MMHG | BODY MASS INDEX: 24.88 KG/M2

## 2023-11-17 DIAGNOSIS — R13.19 ESOPHAGEAL DYSPHAGIA: ICD-10-CM

## 2023-11-17 DIAGNOSIS — Z12.11 SCREENING FOR COLON CANCER: Primary | ICD-10-CM

## 2023-11-17 DIAGNOSIS — R63.4 WEIGHT LOSS: ICD-10-CM

## 2023-11-17 PROCEDURE — 1159F MED LIST DOCD IN RCRD: CPT | Performed by: SURGERY

## 2023-11-17 PROCEDURE — 3078F DIAST BP <80 MM HG: CPT | Performed by: SURGERY

## 2023-11-17 PROCEDURE — 1036F TOBACCO NON-USER: CPT | Performed by: SURGERY

## 2023-11-17 PROCEDURE — 1160F RVW MEDS BY RX/DR IN RCRD: CPT | Performed by: SURGERY

## 2023-11-17 PROCEDURE — 99212 OFFICE O/P EST SF 10 MIN: CPT | Performed by: SURGERY

## 2023-11-17 PROCEDURE — 3074F SYST BP LT 130 MM HG: CPT | Performed by: SURGERY

## 2023-11-17 NOTE — PATIENT INSTRUCTIONS
I am glad that your swallowing issues are resolved.  Continue to take the proton pump inhibitor in the morning.  We will schedule colonoscopy on February 27, 2024

## 2023-11-17 NOTE — PROGRESS NOTES
Patient ID: Aamir Grossman is a 68 y.o. male.  Follow-up for dysphagia.    Subjective   HPI  Patient is much improved after being placed on oral proton pump inhibitor.  He has no difficulty with dysphagia.  Having no complaints.  He also needs to have a colonoscopy done.  Review of Systems   All other systems reviewed and are negative.      Objective   Physical Exam  Constitutional:       Appearance: Normal appearance.   Abdominal:      Palpations: Abdomen is soft. There is no mass.      Tenderness: There is no abdominal tenderness. There is no guarding.         Problem List Items Addressed This Visit            Other Visit Diagnoses       Esophageal dysphagia        Weight loss       Screening for colon cancer.          Assessment/Plan   Continue proton pump inhibitor.  Colonoscopy scheduled for 2/27/2024.

## 2023-12-22 DIAGNOSIS — F32.5 DEPRESSION, MAJOR, IN REMISSION (CMS-HCC): ICD-10-CM

## 2023-12-22 RX ORDER — CITALOPRAM 20 MG/1
20 TABLET, FILM COATED ORAL DAILY
Qty: 90 TABLET | Refills: 1 | Status: SHIPPED | OUTPATIENT
Start: 2023-12-22

## 2024-02-19 ENCOUNTER — PRE-ADMISSION TESTING (OUTPATIENT)
Dept: PREADMISSION TESTING | Facility: HOSPITAL | Age: 69
End: 2024-02-19
Payer: MEDICARE

## 2024-02-19 ENCOUNTER — ANESTHESIA EVENT (OUTPATIENT)
Dept: ANESTHESIOLOGY | Facility: HOSPITAL | Age: 69
End: 2024-02-19

## 2024-02-19 VITALS — WEIGHT: 170 LBS | BODY MASS INDEX: 25.18 KG/M2 | HEIGHT: 69 IN

## 2024-02-19 ASSESSMENT — DUKE ACTIVITY SCORE INDEX (DASI)
CAN YOU PARTICIPATE IN MODERATE RECREATIONAL ACTIVITIES LIKE GOLF, BOWLING, DANCING, DOUBLES TENNIS OR THROWING A BASEBALL OR FOOTBALL: NO
CAN YOU WALK A BLOCK OR TWO ON LEVEL GROUND: YES
CAN YOU DO LIGHT WORK AROUND THE HOUSE LIKE DUSTING OR WASHING DISHES: YES
CAN YOU TAKE CARE OF YOURSELF (EAT, DRESS, BATHE, OR USE TOILET): YES
CAN YOU WALK INDOORS, SUCH AS AROUND YOUR HOUSE: YES
CAN YOU DO MODERATE WORK AROUND THE HOUSE LIKE VACUUMING, SWEEPING FLOORS OR CARRYING GROCERIES: YES
CAN YOU DO HEAVY WORK AROUND THE HOUSE LIKE SCRUBBING FLOORS OR LIFTING AND MOVING HEAVY FURNITURE: YES
CAN YOU CLIMB A FLIGHT OF STAIRS OR WALK UP A HILL: YES
CAN YOU PARTICIPATE IN STRENOUS SPORTS LIKE SWIMMING, SINGLES TENNIS, FOOTBALL, BASKETBALL, OR SKIING: NO
CAN YOU RUN A SHORT DISTANCE: YES
CAN YOU DO YARD WORK LIKE RAKING LEAVES, WEEDING OR PUSHING A MOWER: YES

## 2024-02-19 NOTE — ANESTHESIA PREPROCEDURE EVALUATION
Patient: Aamir Grossman    Procedure Information    Date: 02/19/24  Reason: PAT     There were no vitals filed for this visit.    Past Surgical History:   Procedure Laterality Date    OTHER SURGICAL HISTORY  01/21/2014    Previous Stent Placement    OTHER SURGICAL HISTORY  01/21/2014    Ear Surgery    ROTATOR CUFF REPAIR  05/29/2018    Rotator Cuff Repair    TONSILLECTOMY  05/29/2018    Tonsillectomy     Past Medical History:   Diagnosis Date    Encounter for immunization 09/20/2016    Influenza vaccination administered at current visit    Encounter for screening, unspecified 09/30/2015    Screening    Other specified anxiety disorders     Depression with anxiety    Personal history of diseases of the skin and subcutaneous tissue     History of psoriasis    Personal history of nicotine dependence 10/20/2014    Former smoker, stopped smoking in distant past    Personal history of other diseases of the circulatory system 06/10/2016    History of hypotension    Personal history of other diseases of the circulatory system 01/11/2017    History of hypertension    Personal history of other diseases of the circulatory system     Personal history of congestive heart failure    Personal history of other diseases of the circulatory system     Personal history of coronary atherosclerosis    Personal history of other diseases of the respiratory system 12/30/2015    History of paranasal sinus congestion    Personal history of other endocrine, nutritional and metabolic disease     History of hypothyroidism    Pure hypercholesterolemia, unspecified 10/25/2022    Hypercholesterolemia    Tendinopathy of left rotator cuff 06/13/2023       Current Outpatient Medications:     aspirin 81 mg EC tablet, Take 1 tablet (81 mg) by mouth once daily., Disp: , Rfl:     citalopram (CeleXA) 20 mg tablet, Take 1 tablet (20 mg) by mouth once daily., Disp: 90 tablet, Rfl: 1    clobetasol (Temovate) 0.05 % ointment, , Disp: , Rfl:     clopidogrel  (Plavix) 75 mg tablet, Take 1 tablet (75 mg) by mouth once daily., Disp: , Rfl:     ixekizumab (Taltz Syringe) 80 mg/mL injection, Inject 1 mL (80 mg) under the skin., Disp: , Rfl:     levothyroxine (Synthroid, Levoxyl) 100 mcg tablet, Take 1 tablet (100 mcg) by mouth once daily., Disp: 90 tablet, Rfl: 1    lisinopril 2.5 mg tablet, Take 1 tablet (2.5 mg) by mouth once daily., Disp: , Rfl:     metoprolol succinate XL (Toprol-XL) 25 mg 24 hr tablet, Take 1 tablet (25 mg) by mouth once daily., Disp: , Rfl:     omeprazole (PriLOSEC) 40 mg DR capsule, Take 1 capsule (40 mg) by mouth once daily in the morning. Take before meals. Do not crush or chew., Disp: 30 capsule, Rfl: 3    rosuvastatin (Crestor) 20 mg tablet, Take 1 tablet (20 mg) by mouth once daily., Disp: , Rfl:     sucralfate (Carafate) 1 gram tablet, 1 tab(s) orally 3 times a day (before meals), Disp: , Rfl:   Prior to Admission medications    Medication Sig Start Date End Date Taking? Authorizing Provider   aspirin 81 mg EC tablet Take 1 tablet (81 mg) by mouth once daily. 1/21/14   Historical Provider, MD   citalopram (CeleXA) 20 mg tablet Take 1 tablet (20 mg) by mouth once daily. 12/22/23   Lenora Cobb MD   clobetasol (Temovate) 0.05 % ointment  5/8/23   Historical Provider, MD   clopidogrel (Plavix) 75 mg tablet Take 1 tablet (75 mg) by mouth once daily. 1/21/14   Historical Provider, MD   ixekizumab (Taltz Syringe) 80 mg/mL injection Inject 1 mL (80 mg) under the skin. 5/29/18   Historical Provider, MD   levothyroxine (Synthroid, Levoxyl) 100 mcg tablet Take 1 tablet (100 mcg) by mouth once daily. 9/28/23   Lenora Cobb MD   lisinopril 2.5 mg tablet Take 1 tablet (2.5 mg) by mouth once daily. 6/10/16   Historical Provider, MD   metoprolol succinate XL (Toprol-XL) 25 mg 24 hr tablet Take 1 tablet (25 mg) by mouth once daily. 1/21/14   Historical Provider, MD   omeprazole (PriLOSEC) 40 mg DR capsule Take 1 capsule (40 mg) by mouth once daily in  the morning. Take before meals. Do not crush or chew. 10/6/23 10/5/24  Christian Boateng MD   rosuvastatin (Crestor) 20 mg tablet Take 1 tablet (20 mg) by mouth once daily. 1/21/14   Historical Provider, MD   sucralfate (Carafate) 1 gram tablet 1 tab(s) orally 3 times a day (before meals) 6/16/23   Historical Provider, MD     Allergies   Allergen Reactions    Penicillin Unknown     Social History     Tobacco Use    Smoking status: Former     Types: Cigarettes    Smokeless tobacco: Never   Substance Use Topics    Alcohol use: Yes         Chemistry    Lab Results   Component Value Date/Time     06/16/2023 0623    K 4.4 06/16/2023 0623     06/16/2023 0623    CO2 25 06/16/2023 0623    BUN 19 06/16/2023 0623    CREATININE 1.00 06/16/2023 0623    Lab Results   Component Value Date/Time    CALCIUM 9.4 06/16/2023 0623    ALKPHOS 76 06/15/2023 1157    AST 29 06/15/2023 1157    ALT 23 06/15/2023 1157    BILITOT 0.7 06/15/2023 1157          Lab Results   Component Value Date/Time    WBC 5.1 06/16/2023 0623    HGB 13.0 (L) 06/16/2023 0623    HCT 38.0 (L) 06/16/2023 0623     (L) 06/16/2023 0623     Lab Results   Component Value Date/Time    PROTIME 10.9 03/01/2018 1518    INR 1.0 03/01/2018 1518     No results found for this or any previous visit (from the past 4464 hour(s)).  No results found for this or any previous visit from the past 1095 days.      Relevant Problems   Cardiovascular   (+) Benign hypertension   (+) CAD (coronary artery disease)   (+) Other forms of angina pectoris      Endocrine   (+) Acquired hypothyroidism      GI   (+) Gastroesophageal reflux disease without esophagitis      Neuro/Psych   (+) Depression, major, in remission (CMS/HCC)      Hematology   (+) Anemia      Other   (+) Arthritis of left temporomandibular joint   (+) Psoriatic arthropathy (CMS/HCC)       Clinical information reviewed: Pt. History reviewed. Will obtain cardiac clearance prior to procedure.    Tobacco  Allergies   Meds   Med Hx  Surg Hx   Fam Hx          NPO Detail:  No data recorded     PHYSICAL EXAM: Deferred    Anesthesia Plan    History of general anesthesia?: unknown/emergency  History of complications of general anesthesia?: unknown/emergency    ASA 3     MAC

## 2024-02-19 NOTE — PREPROCEDURE INSTRUCTIONS
Medication List            Accurate as of February 19, 2024  9:18 AM. Always use your most recent med list.                aspirin 81 mg EC tablet  Medication Adjustments for Surgery: Other (Comment)     citalopram 20 mg tablet  Commonly known as: CeleXA  Take 1 tablet (20 mg) by mouth once daily.  Medication Adjustments for Surgery: Take morning of surgery with sip of water, no other fluids     clobetasol 0.05 % ointment  Commonly known as: Temovate  Medication Adjustments for Surgery: Continue until night before surgery     clopidogrel 75 mg tablet  Commonly known as: Plavix  Medication Adjustments for Surgery: Other (Comment)     levothyroxine 100 mcg tablet  Commonly known as: Synthroid, Levoxyl  Take 1 tablet (100 mcg) by mouth once daily.  Medication Adjustments for Surgery: Take morning of surgery with sip of water, no other fluids     lisinopril 2.5 mg tablet  Medication Adjustments for Surgery: Continue until night before surgery     metoprolol succinate XL 25 mg 24 hr tablet  Commonly known as: Toprol-XL  Medication Adjustments for Surgery: Take morning of surgery with sip of water, no other fluids     omeprazole 40 mg DR capsule  Commonly known as: PriLOSEC  Take 1 capsule (40 mg) by mouth once daily in the morning. Take before meals. Do not crush or chew.  Medication Adjustments for Surgery: Continue until night before surgery     rosuvastatin 20 mg tablet  Commonly known as: Crestor  Medication Adjustments for Surgery: Continue until night before surgery     Taltz Syringe 80 mg/mL injection  Generic drug: ixekizumab  Medication Adjustments for Surgery: Continue until night before surgery                              NPO Instructions:    Follow instructions. Day before procedure-may have light breakfast by 9am (ex. 1 egg, 1 piece of toast).  Then CLEAR LIQUIDS ONLY-No dairy products, nothing red/purple.  Take first part of prep- Evening Before Procedure.  Drink lots of liquids.  Day of Procedure-  3-4am Take Second Part of Prep.   STOP DRINKING 3 HOURS PRIOR TO PROCEDURE.  Take medications as instructed.      Additional Instructions:     Review your medication instructions, take indicated medications  Wear  comfortable loose fitting clothing  All jewelry and valuables should be left at home    Park in back of hospital by ER. Come up to Second floor-Outpt dept to check in.  Bring Photo ID and Insurance card,   You MUST have a  with you.      If you get ill at all before your procedure- CALL YOUR DOCTOR/SURGEON.  We want you in the best shape that is possible. Any sickness might lead to your procedure being delayed.      Call Outpatient dept at [phone] the night before your procedure (Friday for Monday procedure), between 1-3 pm.     Stop plavix according to cardiologist recommendations- will notify Dr Goss's office .

## 2024-02-20 ENCOUNTER — HOSPITAL ENCOUNTER (OUTPATIENT)
Dept: RADIOLOGY | Facility: CLINIC | Age: 69
Discharge: HOME | End: 2024-02-20
Payer: MEDICARE

## 2024-02-20 DIAGNOSIS — M79.671 PAIN IN RIGHT FOOT: ICD-10-CM

## 2024-02-20 PROCEDURE — 73630 X-RAY EXAM OF FOOT: CPT | Mod: RT

## 2024-02-20 PROCEDURE — 73630 X-RAY EXAM OF FOOT: CPT | Mod: RIGHT SIDE | Performed by: RADIOLOGY

## 2024-02-23 ENCOUNTER — PREP FOR PROCEDURE (OUTPATIENT)
Dept: SURGERY | Facility: HOSPITAL | Age: 69
End: 2024-02-23
Payer: MEDICARE

## 2024-02-23 RX ORDER — ONDANSETRON HYDROCHLORIDE 2 MG/ML
4 INJECTION, SOLUTION INTRAVENOUS ONCE AS NEEDED
Status: CANCELLED | OUTPATIENT
Start: 2024-02-23

## 2024-02-23 RX ORDER — SODIUM CHLORIDE, SODIUM LACTATE, POTASSIUM CHLORIDE, CALCIUM CHLORIDE 600; 310; 30; 20 MG/100ML; MG/100ML; MG/100ML; MG/100ML
100 INJECTION, SOLUTION INTRAVENOUS CONTINUOUS
Status: CANCELLED | OUTPATIENT
Start: 2024-02-23

## 2024-02-27 ENCOUNTER — ANESTHESIA EVENT (OUTPATIENT)
Dept: GASTROENTEROLOGY | Facility: HOSPITAL | Age: 69
End: 2024-02-27
Payer: MEDICARE

## 2024-02-27 ENCOUNTER — HOSPITAL ENCOUNTER (OUTPATIENT)
Dept: GASTROENTEROLOGY | Facility: HOSPITAL | Age: 69
Discharge: HOME | End: 2024-02-27
Payer: MEDICARE

## 2024-02-27 ENCOUNTER — ANESTHESIA (OUTPATIENT)
Dept: GASTROENTEROLOGY | Facility: HOSPITAL | Age: 69
End: 2024-02-27
Payer: MEDICARE

## 2024-02-27 VITALS
HEIGHT: 69 IN | SYSTOLIC BLOOD PRESSURE: 135 MMHG | TEMPERATURE: 98.6 F | DIASTOLIC BLOOD PRESSURE: 85 MMHG | BODY MASS INDEX: 25.14 KG/M2 | HEART RATE: 67 BPM | WEIGHT: 169.75 LBS | OXYGEN SATURATION: 99 % | RESPIRATION RATE: 16 BRPM

## 2024-02-27 DIAGNOSIS — Z12.11 SCREENING FOR COLON CANCER: ICD-10-CM

## 2024-02-27 PROCEDURE — 2500000005 HC RX 250 GENERAL PHARMACY W/O HCPCS

## 2024-02-27 PROCEDURE — 3700000002 HC GENERAL ANESTHESIA TIME - EACH INCREMENTAL 1 MINUTE

## 2024-02-27 PROCEDURE — 2500000004 HC RX 250 GENERAL PHARMACY W/ HCPCS (ALT 636 FOR OP/ED): Performed by: SURGERY

## 2024-02-27 PROCEDURE — 45385 COLONOSCOPY W/LESION REMOVAL: CPT | Performed by: SURGERY

## 2024-02-27 PROCEDURE — 2500000004 HC RX 250 GENERAL PHARMACY W/ HCPCS (ALT 636 FOR OP/ED)

## 2024-02-27 PROCEDURE — 7100000010 HC PHASE TWO TIME - EACH INCREMENTAL 1 MINUTE

## 2024-02-27 PROCEDURE — 88305 TISSUE EXAM BY PATHOLOGIST: CPT | Mod: TC,SUR,GENLAB | Performed by: SURGERY

## 2024-02-27 PROCEDURE — 3700000001 HC GENERAL ANESTHESIA TIME - INITIAL BASE CHARGE

## 2024-02-27 PROCEDURE — 2720000007 HC OR 272 NO HCPCS

## 2024-02-27 PROCEDURE — 88305 TISSUE EXAM BY PATHOLOGIST: CPT | Performed by: PATHOLOGY

## 2024-02-27 PROCEDURE — 7100000009 HC PHASE TWO TIME - INITIAL BASE CHARGE

## 2024-02-27 RX ORDER — ONDANSETRON HYDROCHLORIDE 2 MG/ML
4 INJECTION, SOLUTION INTRAVENOUS ONCE AS NEEDED
Status: DISCONTINUED | OUTPATIENT
Start: 2024-02-27 | End: 2024-02-28 | Stop reason: HOSPADM

## 2024-02-27 RX ORDER — LIDOCAINE HYDROCHLORIDE 20 MG/ML
INJECTION, SOLUTION EPIDURAL; INFILTRATION; INTRACAUDAL; PERINEURAL AS NEEDED
Status: DISCONTINUED | OUTPATIENT
Start: 2024-02-27 | End: 2024-02-27

## 2024-02-27 RX ORDER — SODIUM CHLORIDE, SODIUM LACTATE, POTASSIUM CHLORIDE, CALCIUM CHLORIDE 600; 310; 30; 20 MG/100ML; MG/100ML; MG/100ML; MG/100ML
100 INJECTION, SOLUTION INTRAVENOUS CONTINUOUS
Status: DISCONTINUED | OUTPATIENT
Start: 2024-02-27 | End: 2024-02-28 | Stop reason: HOSPADM

## 2024-02-27 RX ORDER — PROPOFOL 10 MG/ML
INJECTION, EMULSION INTRAVENOUS AS NEEDED
Status: DISCONTINUED | OUTPATIENT
Start: 2024-02-27 | End: 2024-02-27

## 2024-02-27 RX ADMIN — SODIUM CHLORIDE, POTASSIUM CHLORIDE, SODIUM LACTATE AND CALCIUM CHLORIDE 100 ML/HR: 600; 310; 30; 20 INJECTION, SOLUTION INTRAVENOUS at 07:31

## 2024-02-27 RX ADMIN — LIDOCAINE HYDROCHLORIDE 100 MG: 20 INJECTION, SOLUTION EPIDURAL; INFILTRATION; INTRACAUDAL; PERINEURAL at 07:44

## 2024-02-27 RX ADMIN — PROPOFOL 200 MG: 10 INJECTION, EMULSION INTRAVENOUS at 07:44

## 2024-02-27 SDOH — HEALTH STABILITY: MENTAL HEALTH: CURRENT SMOKER: 0

## 2024-02-27 ASSESSMENT — PAIN - FUNCTIONAL ASSESSMENT
PAIN_FUNCTIONAL_ASSESSMENT: 0-10
PAIN_FUNCTIONAL_ASSESSMENT: 0-10

## 2024-02-27 ASSESSMENT — PAIN SCALES - GENERAL
PAINLEVEL_OUTOF10: 0 - NO PAIN
PAINLEVEL_OUTOF10: 7
PAIN_LEVEL: 0

## 2024-02-27 ASSESSMENT — COLUMBIA-SUICIDE SEVERITY RATING SCALE - C-SSRS
2. HAVE YOU ACTUALLY HAD ANY THOUGHTS OF KILLING YOURSELF?: NO
6. HAVE YOU EVER DONE ANYTHING, STARTED TO DO ANYTHING, OR PREPARED TO DO ANYTHING TO END YOUR LIFE?: NO
1. IN THE PAST MONTH, HAVE YOU WISHED YOU WERE DEAD OR WISHED YOU COULD GO TO SLEEP AND NOT WAKE UP?: NO

## 2024-02-27 NOTE — ANESTHESIA PREPROCEDURE EVALUATION
Patient: Aamir Grossman    Procedure Information       Date/Time: 02/27/24 0800    Scheduled providers: Christian Boateng MD    Procedure: COLONOSCOPY    Location: [facility]            Relevant Problems   Anesthesia (within normal limits)      Cardiovascular   (+) Benign hypertension   (+) CAD (coronary artery disease)   (+) Other forms of angina pectoris      Endocrine   (+) Acquired hypothyroidism   (+) Vitamin D deficiency      GI   (+) Gastroesophageal reflux disease without esophagitis      /Renal (within normal limits)      Neuro/Psych   (+) Depression, major, in remission (CMS/HCC)      Pulmonary (within normal limits)      GI/Hepatic (within normal limits)      Hematology   (+) Anemia      Musculoskeletal (within normal limits)   (+) Tendinopathy of left rotator cuff      Eyes, Ears, Nose, and Throat (within normal limits)      Infectious Disease (within normal limits)      Digestive   (+) Cervical dysphagia      Genitourinary   (+) Erectile dysfunction      Symptoms and Signs   (+) Vasovagal episode      Gastrointestinal and Abdominal   (+) Cricopharyngeal dysphagia      Other   (+) Arthritis of left temporomandibular joint   (+) Psoriatic arthropathy (CMS/HCC)       Clinical information reviewed:   Tobacco  Allergies  Meds   Med Hx  Surg Hx   Fam Hx  Soc Hx        NPO Detail:  NPO/Void Status  Carbohydrate Drink Given Prior to Surgery? : N  Date of Last Liquid: 02/27/24  Time of Last Liquid: 0400  Date of Last Solid: 02/26/24  Time of Last Solid: 0930  Last Intake Type: Clear fluids         Physical Exam    Airway  Mallampati: II  TM distance: >3 FB  Neck ROM: full     Cardiovascular - normal exam     Dental        Pulmonary - normal exam     Abdominal            Anesthesia Plan    History of general anesthesia?: yes  History of complications of general anesthesia?: no    ASA 3     MAC     The patient is not a current smoker.  Patient did not smoke on day of procedure.    intravenous  induction   Anesthetic plan and risks discussed with patient.    Plan discussed with CRNA.

## 2024-02-27 NOTE — DISCHARGE INSTRUCTIONS

## 2024-02-27 NOTE — ANESTHESIA POSTPROCEDURE EVALUATION
Patient: Aamir Grossman    Procedure Summary       Date: 02/27/24 Room / Location: [facility]    Anesthesia Start: 0739 Anesthesia Stop: 0815    Procedure: COLONOSCOPY Diagnosis: Screening for colon cancer    Scheduled Providers: Christian Boateng MD Responsible Provider: RAFA Junior    Anesthesia Type: MAC ASA Status: 3            Anesthesia Type: MAC    Vitals Value Taken Time   /85 02/27/24 0824   Temp 37 °C (98.6 °F) 02/27/24 0813   Pulse 67 02/27/24 0824   Resp 16 02/27/24 0824   SpO2 99 % 02/27/24 0824       Anesthesia Post Evaluation    Patient location during evaluation: PACU  Patient participation: complete - patient participated  Level of consciousness: awake and alert  Pain score: 0  Pain management: adequate  Airway patency: patent  Cardiovascular status: acceptable  Respiratory status: acceptable  Hydration status: acceptable  Postoperative Nausea and Vomiting: none        No notable events documented.

## 2024-02-27 NOTE — POST-PROCEDURE NOTE
0817 pt tolerating snacks and ginger ale  0826 dc instructions reviewed with pt and pt's wife.   0828 Dr Boateng at bedside, discusses scope findings

## 2024-02-27 NOTE — H&P
History Of Present Illness  Aamir Grossman is a 68 y.o. male presenting for a low risk colonoscopy      Past Medical History  Past Medical History:   Diagnosis Date    Coronary artery disease     Encounter for immunization 09/20/2016    Influenza vaccination administered at current visit    Encounter for screening, unspecified 09/30/2015    Screening    GERD (gastroesophageal reflux disease)     Hypertension     Hypothyroidism     Myocardial infarction (CMS/HCC)     Other specified anxiety disorders     Depression with anxiety    Personal history of diseases of the skin and subcutaneous tissue     History of psoriasis    Personal history of nicotine dependence 10/20/2014    Former smoker, stopped smoking in distant past    Personal history of other diseases of the circulatory system 06/10/2016    History of hypotension    Personal history of other diseases of the circulatory system 01/11/2017    History of hypertension    Personal history of other diseases of the circulatory system     Personal history of congestive heart failure    Personal history of other diseases of the circulatory system     Personal history of coronary atherosclerosis    Personal history of other diseases of the respiratory system 12/30/2015    History of paranasal sinus congestion    Personal history of other endocrine, nutritional and metabolic disease     History of hypothyroidism    Pure hypercholesterolemia, unspecified 10/25/2022    Hypercholesterolemia    Tendinopathy of left rotator cuff 06/13/2023       Surgical History  Past Surgical History:   Procedure Laterality Date    CARDIAC SURGERY      CORONARY STENT PLACEMENT      OTHER SURGICAL HISTORY  01/21/2014    Previous Stent Placement    OTHER SURGICAL HISTORY  01/21/2014    Ear Surgery    ROTATOR CUFF REPAIR Left 05/29/2018    Rotator Cuff Repair    TONSILLECTOMY  05/29/2018    Tonsillectomy        Social History  He reports that he quit smoking about 24 years ago. His smoking use  included cigarettes. He has a 20.00 pack-year smoking history. He has never used smokeless tobacco. He reports current alcohol use. He reports that he does not use drugs.    Family History  Family History   Problem Relation Name Age of Onset    Hypertension Mother      Hyperlipidemia Mother      Osteoporosis Mother      Coronary artery disease Father          Allergies  Penicillin    Review of Systems   All other systems reviewed and are negative.       Physical Exam  Constitutional:       Appearance: Normal appearance.   Abdominal:      Palpations: Abdomen is soft. There is no mass.      Tenderness: There is no abdominal tenderness. There is no guarding.      Hernia: No hernia is present.          Last Recorded Vitals  There were no vitals taken for this visit.    Relevant Results           Assessment/Plan   Active Problems: Low risk colon cancer   There are no active Hospital Problems.    COLONOSCOPY/BIOPSIES.  Risks include, but not limited to pain, infection, bleeding, perforation, missed lesions, aspiration, risk of cardiac, pulmonary, neurologic, locomotor, anesthetic events, incomplete colonoscopy, and other unforeseen complications including death        Christian Boateng MD

## 2024-02-28 ASSESSMENT — PAIN SCALES - GENERAL: PAINLEVEL_OUTOF10: 0 - NO PAIN

## 2024-03-01 LAB
LABORATORY COMMENT REPORT: NORMAL
PATH REPORT.FINAL DX SPEC: NORMAL
PATH REPORT.GROSS SPEC: NORMAL
PATH REPORT.RELEVANT HX SPEC: NORMAL
PATH REPORT.TOTAL CANCER: NORMAL

## 2024-03-02 ENCOUNTER — TELEPHONE (OUTPATIENT)
Dept: SURGERY | Facility: CLINIC | Age: 69
End: 2024-03-02
Payer: MEDICARE

## 2024-03-02 NOTE — TELEPHONE ENCOUNTER
----- Message from Christian Boateng MD sent at 3/1/2024  4:36 PM EST -----  Let patient know polyp was removed and nothing to worry about. A my chart reminder will be sent for a repeat colonoscopy in 5 years

## 2024-03-04 ENCOUNTER — TELEPHONE (OUTPATIENT)
Dept: SURGERY | Facility: CLINIC | Age: 69
End: 2024-03-04
Payer: MEDICARE

## 2024-03-06 ENCOUNTER — APPOINTMENT (OUTPATIENT)
Dept: PODIATRY | Facility: CLINIC | Age: 69
End: 2024-03-06
Payer: MEDICARE

## 2024-03-14 ENCOUNTER — APPOINTMENT (OUTPATIENT)
Dept: PRIMARY CARE | Facility: CLINIC | Age: 69
End: 2024-03-14
Payer: MEDICARE

## 2024-03-18 ENCOUNTER — OFFICE VISIT (OUTPATIENT)
Dept: PRIMARY CARE | Facility: CLINIC | Age: 69
End: 2024-03-18
Payer: MEDICARE

## 2024-03-18 VITALS
WEIGHT: 170 LBS | OXYGEN SATURATION: 96 % | HEIGHT: 69 IN | TEMPERATURE: 97.6 F | SYSTOLIC BLOOD PRESSURE: 124 MMHG | BODY MASS INDEX: 25.18 KG/M2 | HEART RATE: 94 BPM | DIASTOLIC BLOOD PRESSURE: 80 MMHG

## 2024-03-18 DIAGNOSIS — L40.50 ARTHROPATHIC PSORIASIS, UNSPECIFIED (MULTI): ICD-10-CM

## 2024-03-18 DIAGNOSIS — I11.0 HYPERTENSIVE HEART DISEASE WITH HEART FAILURE (MULTI): ICD-10-CM

## 2024-03-18 DIAGNOSIS — Z00.00 ROUTINE GENERAL MEDICAL EXAMINATION AT HEALTH CARE FACILITY: Primary | ICD-10-CM

## 2024-03-18 DIAGNOSIS — R53.83 OTHER FATIGUE: ICD-10-CM

## 2024-03-18 DIAGNOSIS — E03.9 ACQUIRED HYPOTHYROIDISM: ICD-10-CM

## 2024-03-18 DIAGNOSIS — I10 HYPERTENSION, UNSPECIFIED TYPE: ICD-10-CM

## 2024-03-18 DIAGNOSIS — E53.8 VITAMIN B12 DEFICIENCY: ICD-10-CM

## 2024-03-18 DIAGNOSIS — E55.9 VITAMIN D DEFICIENCY, UNSPECIFIED: ICD-10-CM

## 2024-03-18 DIAGNOSIS — Z12.5 SCREENING FOR PROSTATE CANCER: ICD-10-CM

## 2024-03-18 DIAGNOSIS — D69.1 ABNORMAL PLATELETS (MULTI): ICD-10-CM

## 2024-03-18 DIAGNOSIS — Z13.89 SCREENING FOR MULTIPLE CONDITIONS: ICD-10-CM

## 2024-03-18 DIAGNOSIS — F32.5 DEPRESSION, MAJOR, IN REMISSION (CMS-HCC): ICD-10-CM

## 2024-03-18 DIAGNOSIS — E78.00 HYPERCHOLESTEREMIA: ICD-10-CM

## 2024-03-18 DIAGNOSIS — Z79.899 HIGH RISK MEDICATION USE: ICD-10-CM

## 2024-03-18 DIAGNOSIS — Z13.6 SCREENING FOR AAA (ABDOMINAL AORTIC ANEURYSM): ICD-10-CM

## 2024-03-18 DIAGNOSIS — Z11.59 NEED FOR HEPATITIS C SCREENING TEST: ICD-10-CM

## 2024-03-18 PROCEDURE — 1170F FXNL STATUS ASSESSED: CPT | Performed by: INTERNAL MEDICINE

## 2024-03-18 PROCEDURE — 1123F ACP DISCUSS/DSCN MKR DOCD: CPT | Performed by: INTERNAL MEDICINE

## 2024-03-18 PROCEDURE — 1036F TOBACCO NON-USER: CPT | Performed by: INTERNAL MEDICINE

## 2024-03-18 PROCEDURE — G0439 PPPS, SUBSEQ VISIT: HCPCS | Performed by: INTERNAL MEDICINE

## 2024-03-18 PROCEDURE — 3074F SYST BP LT 130 MM HG: CPT | Performed by: INTERNAL MEDICINE

## 2024-03-18 PROCEDURE — 1158F ADVNC CARE PLAN TLK DOCD: CPT | Performed by: INTERNAL MEDICINE

## 2024-03-18 PROCEDURE — 1159F MED LIST DOCD IN RCRD: CPT | Performed by: INTERNAL MEDICINE

## 2024-03-18 PROCEDURE — 1160F RVW MEDS BY RX/DR IN RCRD: CPT | Performed by: INTERNAL MEDICINE

## 2024-03-18 PROCEDURE — 3079F DIAST BP 80-89 MM HG: CPT | Performed by: INTERNAL MEDICINE

## 2024-03-18 PROCEDURE — 99214 OFFICE O/P EST MOD 30 MIN: CPT | Performed by: INTERNAL MEDICINE

## 2024-03-18 RX ORDER — LEVOTHYROXINE SODIUM 100 UG/1
100 TABLET ORAL DAILY
Qty: 90 TABLET | Refills: 1 | Status: SHIPPED | OUTPATIENT
Start: 2024-03-18 | End: 2024-04-21

## 2024-03-18 ASSESSMENT — ACTIVITIES OF DAILY LIVING (ADL)
BATHING: INDEPENDENT
MANAGING_FINANCES: INDEPENDENT
TAKING_MEDICATION: INDEPENDENT
GROCERY_SHOPPING: INDEPENDENT
DRESSING: INDEPENDENT
DOING_HOUSEWORK: INDEPENDENT

## 2024-03-18 ASSESSMENT — ENCOUNTER SYMPTOMS
FATIGUE: 0
DIFFICULTY URINATING: 0
PALPITATIONS: 0
BLOOD IN STOOL: 0
DIARRHEA: 0
HEADACHES: 0
UNEXPECTED WEIGHT CHANGE: 0
SORE THROAT: 0
SINUS PAIN: 0
COUGH: 0
ABDOMINAL PAIN: 0
WHEEZING: 0
ARTHRALGIAS: 0
BRUISES/BLEEDS EASILY: 0
FEVER: 0
DIZZINESS: 0

## 2024-03-18 NOTE — PROGRESS NOTES
"Subjective   Patient ID: Aamir Grossman is a 68 y.o. male who presents for Medicare Annual Wellness Visit Subsequent.    HPI       Review of Systems    Objective   No results found for: \"HGBA1C\"   /80   Pulse 94   Temp 36.4 °C (97.6 °F)   Ht 1.753 m (5' 9\")   Wt 77.1 kg (170 lb)   SpO2 96%   BMI 25.10 kg/m²     Physical Exam    Assessment/Plan   Aamir was seen today for medicare annual wellness visit subsequent.  Diagnoses and all orders for this visit:  Acquired hypothyroidism  Other orders  -     Follow Up In Primary Care - Established     "

## 2024-03-18 NOTE — PATIENT INSTRUCTIONS

## 2024-03-18 NOTE — PROGRESS NOTES
Subjective   Reason for Visit: Aamir Grossman is an 68 y.o. male here for a Medicare Wellness visit.     Past Medical, Surgical, and Family History reviewed and updated in chart.    Reviewed all medications by prescribing practitioner or clinical pharmacist (such as prescriptions, OTCs, herbal therapies and supplements) and documented in the medical record.    Annual preventive visit  - Vaccinations reviewed and up-to-date  - Needs a screening for abdominal aneurysm new order placed today history of smoking quit smoking years ago  -Screening for colon cancer obtained obtained in February 2020 4 repeat in 5 years 2029  - Screening for depression  I spent 15 minutes obtaining and discussing depression screening using PHQ-2 questions with results documented in the chart.  -Advanced directive reviewed  - Needs to complete blood work in 3 months    Follow-up  -Chronic reflux disease seen by surgery recommendation to continue with Protonix complete abstinence of alcohol  - Coccygeal pain improved  -History of coronary artery disease compensated no shortness of breath compensated continue with aspirin daily no chest pain  - Hypercholesterolemia controlled continue with low-fat diet exercise  -Hypothyroid compensated follow-up thyroid function  -BPH compensated  - Abnormal platelets compensated no signs of bleeding follow-up CBC  Follow-up lab results  Chronic depression controlled  -Hypothyroid continue levothyroxine  - Psoriasis and psoriatic arthritis compensated continue with current medication  Follow-up 3 months               Patient Care Team:  Lenora Cobb MD as PCP - General  Lenora Cobb MD as PCP - [facility]     Review of Systems   Constitutional:  Negative for fatigue, fever and unexpected weight change.   HENT:  Negative for congestion, ear discharge, ear pain, mouth sores, sinus pain and sore throat.    Eyes:  Negative for visual disturbance.   Respiratory:  Negative for cough and  "wheezing.    Cardiovascular:  Negative for chest pain, palpitations and leg swelling.   Gastrointestinal:  Negative for abdominal pain, blood in stool and diarrhea.   Genitourinary:  Negative for difficulty urinating.   Musculoskeletal:  Negative for arthralgias.   Skin:  Negative for rash.   Neurological:  Negative for dizziness and headaches.   Hematological:  Does not bruise/bleed easily.   Psychiatric/Behavioral:  Negative for behavioral problems.    All other systems reviewed and are negative.      Objective   Vitals:  /80   Pulse 94   Temp 36.4 °C (97.6 °F)   Ht 1.753 m (5' 9\")   Wt 77.1 kg (170 lb)   SpO2 96%   BMI 25.10 kg/m²       Physical Exam  Vitals and nursing note reviewed.   Constitutional:       Appearance: Normal appearance.   HENT:      Head: Normocephalic.      Nose: Nose normal.   Eyes:      Conjunctiva/sclera: Conjunctivae normal.      Pupils: Pupils are equal, round, and reactive to light.   Cardiovascular:      Rate and Rhythm: Regular rhythm.   Pulmonary:      Effort: Pulmonary effort is normal.      Breath sounds: Normal breath sounds.   Abdominal:      General: Abdomen is flat.      Palpations: Abdomen is soft.   Musculoskeletal:      Cervical back: Neck supple.   Skin:     General: Skin is warm.   Neurological:      General: No focal deficit present.      Mental Status: He is oriented to person, place, and time.   Psychiatric:         Mood and Affect: Mood normal.         Assessment/Plan   Problem List Items Addressed This Visit       Abnormal platelets (CMS/HCC)    Depression, major, in remission (CMS/HCC)    Acquired hypothyroidism    Relevant Medications    levothyroxine (Synthroid, Levoxyl) 100 mcg tablet    Hypertensive heart disease with heart failure (CMS/HCC)     Other Visit Diagnoses       Routine general medical examination at health care facility    -  Primary    High risk medication use        Relevant Orders    CBC and Auto Differential    Hypertension, unspecified " type        Relevant Orders    Comprehensive Metabolic Panel    Hypercholesteremia        Relevant Orders    Lipid Panel    Other fatigue        Relevant Orders    TSH with reflex to Free T4 if abnormal    Vitamin B12 deficiency        Relevant Orders    Vitamin B12    Vitamin D deficiency, unspecified        Relevant Orders    Vitamin D 25-Hydroxy,Total (for eval of Vitamin D levels)    Screening for prostate cancer        Relevant Orders    Prostate Specific Antigen, Screen    Screening for multiple conditions        Need for hepatitis C screening test        Relevant Orders    Hepatitis C antibody    Screening for AAA (abdominal aortic aneurysm)        Arthropathic psoriasis, unspecified (CMS/HCC)              Annual preventive visit  - Vaccinations reviewed and up-to-date  - Needs a screening for abdominal aneurysm new order placed today history of smoking quit smoking years ago  -Screening for colon cancer obtained obtained in February 2020 4 repeat in 5 years 2029  - Screening for depression  I spent 15 minutes obtaining and discussing depression screening using PHQ-2 questions with results documented in the chart.  -Advanced directive reviewed  - Needs to complete blood work in 3 months    Follow-up  -Chronic reflux disease seen by surgery recommendation to continue with Protonix complete abstinence of alcohol  - Coccygeal pain improved  -History of coronary artery disease compensated no shortness of breath compensated continue with aspirin daily no chest pain  - Hypercholesterolemia controlled continue with low-fat diet exercise  -Hypothyroid compensated follow-up thyroid function  -BPH compensated  - Abnormal platelets compensated no signs of bleeding follow-up CBC  Follow-up lab results  Chronic depression controlled  -Hypothyroid continue levothyroxine  Follow-up 3 months          Patient was identified as a fall risk. Risk prevention instructions provided.

## 2024-04-19 DIAGNOSIS — E03.9 ACQUIRED HYPOTHYROIDISM: ICD-10-CM

## 2024-04-21 RX ORDER — LEVOTHYROXINE SODIUM 100 UG/1
100 TABLET ORAL DAILY
Qty: 90 TABLET | Refills: 0 | Status: SHIPPED | OUTPATIENT
Start: 2024-04-21

## 2024-04-26 ENCOUNTER — APPOINTMENT (OUTPATIENT)
Dept: RADIOLOGY | Facility: HOSPITAL | Age: 69
End: 2024-04-26
Payer: MEDICARE

## 2024-04-26 ENCOUNTER — APPOINTMENT (OUTPATIENT)
Dept: CARDIOLOGY | Facility: HOSPITAL | Age: 69
End: 2024-04-26
Payer: MEDICARE

## 2024-04-26 ENCOUNTER — HOSPITAL ENCOUNTER (EMERGENCY)
Facility: HOSPITAL | Age: 69
Discharge: ANOTHER HOSPITAL | End: 2024-04-26
Attending: EMERGENCY MEDICINE
Payer: MEDICARE

## 2024-04-26 ENCOUNTER — HOSPITAL ENCOUNTER (INPATIENT)
Facility: HOSPITAL | Age: 69
LOS: 3 days | Discharge: HOME | DRG: 281 | End: 2024-04-29
Attending: FAMILY MEDICINE | Admitting: INTERNAL MEDICINE
Payer: MEDICARE

## 2024-04-26 VITALS
OXYGEN SATURATION: 88 % | TEMPERATURE: 97.7 F | DIASTOLIC BLOOD PRESSURE: 89 MMHG | BODY MASS INDEX: 25.18 KG/M2 | WEIGHT: 170 LBS | HEART RATE: 59 BPM | RESPIRATION RATE: 16 BRPM | HEIGHT: 69 IN | SYSTOLIC BLOOD PRESSURE: 169 MMHG

## 2024-04-26 DIAGNOSIS — I11.0 HYPERTENSIVE HEART DISEASE WITH HEART FAILURE (MULTI): ICD-10-CM

## 2024-04-26 DIAGNOSIS — I21.4 NSTEMI (NON-ST ELEVATED MYOCARDIAL INFARCTION) (MULTI): Primary | ICD-10-CM

## 2024-04-26 DIAGNOSIS — K04.7 DENTAL INFECTION: ICD-10-CM

## 2024-04-26 DIAGNOSIS — I25.10 CAD (CORONARY ARTERY DISEASE): Primary | ICD-10-CM

## 2024-04-26 LAB
ALBUMIN SERPL BCP-MCNC: 4.5 G/DL (ref 3.4–5)
ALP SERPL-CCNC: 93 U/L (ref 33–136)
ALT SERPL W P-5'-P-CCNC: 33 U/L (ref 10–52)
ANION GAP SERPL CALC-SCNC: 19 MMOL/L (ref 10–20)
APTT PPP: 34 SECONDS (ref 27–38)
AST SERPL W P-5'-P-CCNC: 33 U/L (ref 9–39)
ATRIAL RATE: 53 BPM
ATRIAL RATE: 54 BPM
BASOPHILS # BLD AUTO: 0.03 X10*3/UL (ref 0–0.1)
BASOPHILS NFR BLD AUTO: 0.4 %
BILIRUB SERPL-MCNC: 0.9 MG/DL (ref 0–1.2)
BNP SERPL-MCNC: 31 PG/ML (ref 0–99)
BUN SERPL-MCNC: 20 MG/DL (ref 6–23)
CALCIUM SERPL-MCNC: 9.9 MG/DL (ref 8.6–10.3)
CARDIAC TROPONIN I PNL SERPL HS: 15 NG/L (ref 0–20)
CARDIAC TROPONIN I PNL SERPL HS: 27 NG/L (ref 0–20)
CARDIAC TROPONIN I PNL SERPL HS: 5466 NG/L (ref 0–20)
CARDIAC TROPONIN I PNL SERPL HS: 616 NG/L (ref 0–20)
CHLORIDE SERPL-SCNC: 101 MMOL/L (ref 98–107)
CO2 SERPL-SCNC: 21 MMOL/L (ref 21–32)
CREAT SERPL-MCNC: 1.33 MG/DL (ref 0.5–1.3)
D DIMER PPP FEU-MCNC: 549 NG/ML FEU
EGFRCR SERPLBLD CKD-EPI 2021: 58 ML/MIN/1.73M*2
EOSINOPHIL # BLD AUTO: 0.11 X10*3/UL (ref 0–0.7)
EOSINOPHIL NFR BLD AUTO: 1.3 %
ERYTHROCYTE [DISTWIDTH] IN BLOOD BY AUTOMATED COUNT: 11.1 % (ref 11.5–14.5)
FLUAV RNA RESP QL NAA+PROBE: NOT DETECTED
FLUBV RNA RESP QL NAA+PROBE: NOT DETECTED
GLUCOSE SERPL-MCNC: 96 MG/DL (ref 74–99)
HCT VFR BLD AUTO: 40.6 % (ref 41–52)
HGB BLD-MCNC: 14.3 G/DL (ref 13.5–17.5)
IMM GRANULOCYTES # BLD AUTO: 0.02 X10*3/UL (ref 0–0.7)
IMM GRANULOCYTES NFR BLD AUTO: 0.2 % (ref 0–0.9)
INR PPP: 1 (ref 0.9–1.1)
LYMPHOCYTES # BLD AUTO: 1.2 X10*3/UL (ref 1.2–4.8)
LYMPHOCYTES NFR BLD AUTO: 14.5 %
MAGNESIUM SERPL-MCNC: 1.89 MG/DL (ref 1.6–2.4)
MCH RBC QN AUTO: 37 PG (ref 26–34)
MCHC RBC AUTO-ENTMCNC: 35.2 G/DL (ref 32–36)
MCV RBC AUTO: 105 FL (ref 80–100)
MONOCYTES # BLD AUTO: 0.72 X10*3/UL (ref 0.1–1)
MONOCYTES NFR BLD AUTO: 8.7 %
NEUTROPHILS # BLD AUTO: 6.21 X10*3/UL (ref 1.2–7.7)
NEUTROPHILS NFR BLD AUTO: 74.9 %
NRBC BLD-RTO: 0 /100 WBCS (ref 0–0)
P AXIS: 47 DEGREES
P AXIS: 52 DEGREES
P OFFSET: 178 MS
P OFFSET: 185 MS
P ONSET: 127 MS
P ONSET: 129 MS
PLATELET # BLD AUTO: 109 X10*3/UL (ref 150–450)
POTASSIUM SERPL-SCNC: 4.5 MMOL/L (ref 3.5–5.3)
PR INTERVAL: 188 MS
PR INTERVAL: 196 MS
PROT SERPL-MCNC: 7.4 G/DL (ref 6.4–8.2)
PROTHROMBIN TIME: 11.1 SECONDS (ref 9.8–12.8)
Q ONSET: 223 MS
Q ONSET: 225 MS
QRS COUNT: 9 BEATS
QRS COUNT: 9 BEATS
QRS DURATION: 84 MS
QRS DURATION: 90 MS
QT INTERVAL: 440 MS
QT INTERVAL: 450 MS
QTC CALCULATION(BAZETT): 417 MS
QTC CALCULATION(BAZETT): 422 MS
QTC FREDERICIA: 424 MS
QTC FREDERICIA: 432 MS
R AXIS: -13 DEGREES
R AXIS: -35 DEGREES
RBC # BLD AUTO: 3.87 X10*6/UL (ref 4.5–5.9)
SARS-COV-2 RNA RESP QL NAA+PROBE: NOT DETECTED
SODIUM SERPL-SCNC: 136 MMOL/L (ref 136–145)
T AXIS: 33 DEGREES
T AXIS: 48 DEGREES
T OFFSET: 443 MS
T OFFSET: 450 MS
UFH PPP CHRO-ACNC: 0.3 IU/ML
VENTRICULAR RATE: 53 BPM
VENTRICULAR RATE: 54 BPM
WBC # BLD AUTO: 8.3 X10*3/UL (ref 4.4–11.3)

## 2024-04-26 PROCEDURE — 84075 ASSAY ALKALINE PHOSPHATASE: CPT | Performed by: EMERGENCY MEDICINE

## 2024-04-26 PROCEDURE — 84484 ASSAY OF TROPONIN QUANT: CPT | Performed by: EMERGENCY MEDICINE

## 2024-04-26 PROCEDURE — 71045 X-RAY EXAM CHEST 1 VIEW: CPT

## 2024-04-26 PROCEDURE — 2500000001 HC RX 250 WO HCPCS SELF ADMINISTERED DRUGS (ALT 637 FOR MEDICARE OP): Performed by: EMERGENCY MEDICINE

## 2024-04-26 PROCEDURE — 85520 HEPARIN ASSAY: CPT

## 2024-04-26 PROCEDURE — 99223 1ST HOSP IP/OBS HIGH 75: CPT

## 2024-04-26 PROCEDURE — 71275 CT ANGIOGRAPHY CHEST: CPT | Mod: FOREIGN READ | Performed by: RADIOLOGY

## 2024-04-26 PROCEDURE — 84484 ASSAY OF TROPONIN QUANT: CPT | Mod: 91

## 2024-04-26 PROCEDURE — 2500000004 HC RX 250 GENERAL PHARMACY W/ HCPCS (ALT 636 FOR OP/ED)

## 2024-04-26 PROCEDURE — 83880 ASSAY OF NATRIURETIC PEPTIDE: CPT | Performed by: EMERGENCY MEDICINE

## 2024-04-26 PROCEDURE — 96374 THER/PROPH/DIAG INJ IV PUSH: CPT | Mod: 59

## 2024-04-26 PROCEDURE — 96375 TX/PRO/DX INJ NEW DRUG ADDON: CPT | Mod: 59

## 2024-04-26 PROCEDURE — 2500000001 HC RX 250 WO HCPCS SELF ADMINISTERED DRUGS (ALT 637 FOR MEDICARE OP)

## 2024-04-26 PROCEDURE — 85610 PROTHROMBIN TIME: CPT

## 2024-04-26 PROCEDURE — 36415 COLL VENOUS BLD VENIPUNCTURE: CPT | Performed by: EMERGENCY MEDICINE

## 2024-04-26 PROCEDURE — 85730 THROMBOPLASTIN TIME PARTIAL: CPT | Performed by: EMERGENCY MEDICINE

## 2024-04-26 PROCEDURE — 93005 ELECTROCARDIOGRAM TRACING: CPT

## 2024-04-26 PROCEDURE — 1200000002 HC GENERAL ROOM WITH TELEMETRY DAILY

## 2024-04-26 PROCEDURE — 71045 X-RAY EXAM CHEST 1 VIEW: CPT | Mod: FOREIGN READ | Performed by: RADIOLOGY

## 2024-04-26 PROCEDURE — 87636 SARSCOV2 & INF A&B AMP PRB: CPT | Performed by: EMERGENCY MEDICINE

## 2024-04-26 PROCEDURE — 85379 FIBRIN DEGRADATION QUANT: CPT | Performed by: EMERGENCY MEDICINE

## 2024-04-26 PROCEDURE — 71275 CT ANGIOGRAPHY CHEST: CPT

## 2024-04-26 PROCEDURE — 83735 ASSAY OF MAGNESIUM: CPT | Performed by: EMERGENCY MEDICINE

## 2024-04-26 PROCEDURE — 85025 COMPLETE CBC W/AUTO DIFF WBC: CPT | Performed by: EMERGENCY MEDICINE

## 2024-04-26 PROCEDURE — 2550000001 HC RX 255 CONTRASTS: Performed by: EMERGENCY MEDICINE

## 2024-04-26 PROCEDURE — 99291 CRITICAL CARE FIRST HOUR: CPT | Performed by: EMERGENCY MEDICINE

## 2024-04-26 PROCEDURE — 2500000004 HC RX 250 GENERAL PHARMACY W/ HCPCS (ALT 636 FOR OP/ED): Performed by: EMERGENCY MEDICINE

## 2024-04-26 RX ORDER — CLOBETASOL PROPIONATE 0.5 MG/G
CREAM TOPICAL 2 TIMES DAILY
Status: DISCONTINUED | OUTPATIENT
Start: 2024-04-26 | End: 2024-04-29 | Stop reason: HOSPADM

## 2024-04-26 RX ORDER — HEPARIN SODIUM 10000 [USP'U]/100ML
0-4000 INJECTION, SOLUTION INTRAVENOUS CONTINUOUS
Status: DISCONTINUED | OUTPATIENT
Start: 2024-04-26 | End: 2024-04-26 | Stop reason: HOSPADM

## 2024-04-26 RX ORDER — NITROGLYCERIN 0.4 MG/1
0.4 TABLET SUBLINGUAL EVERY 5 MIN PRN
Status: DISCONTINUED | OUTPATIENT
Start: 2024-04-26 | End: 2024-04-29 | Stop reason: HOSPADM

## 2024-04-26 RX ORDER — NAPROXEN SODIUM 220 MG/1
324 TABLET, FILM COATED ORAL ONCE
Status: COMPLETED | OUTPATIENT
Start: 2024-04-26 | End: 2024-04-26

## 2024-04-26 RX ORDER — PANTOPRAZOLE SODIUM 40 MG/10ML
40 INJECTION, POWDER, LYOPHILIZED, FOR SOLUTION INTRAVENOUS DAILY
Status: DISCONTINUED | OUTPATIENT
Start: 2024-04-27 | End: 2024-04-29 | Stop reason: HOSPADM

## 2024-04-26 RX ORDER — MORPHINE SULFATE 4 MG/ML
2 INJECTION, SOLUTION INTRAMUSCULAR; INTRAVENOUS ONCE
Status: COMPLETED | OUTPATIENT
Start: 2024-04-26 | End: 2024-04-26

## 2024-04-26 RX ORDER — NAPROXEN SODIUM 220 MG/1
TABLET, FILM COATED ORAL
Status: COMPLETED
Start: 2024-04-26 | End: 2024-04-26

## 2024-04-26 RX ORDER — OXYCODONE AND ACETAMINOPHEN 5; 325 MG/1; MG/1
1 TABLET ORAL ONCE
Status: COMPLETED | OUTPATIENT
Start: 2024-04-26 | End: 2024-04-26

## 2024-04-26 RX ORDER — ATORVASTATIN CALCIUM 80 MG/1
80 TABLET, FILM COATED ORAL NIGHTLY
Status: DISCONTINUED | OUTPATIENT
Start: 2024-04-26 | End: 2024-04-29 | Stop reason: HOSPADM

## 2024-04-26 RX ORDER — CLOPIDOGREL BISULFATE 75 MG/1
75 TABLET ORAL DAILY
Status: DISCONTINUED | OUTPATIENT
Start: 2024-04-27 | End: 2024-04-29 | Stop reason: HOSPADM

## 2024-04-26 RX ORDER — CLOPIDOGREL BISULFATE 75 MG/1
300 TABLET ORAL ONCE
Status: COMPLETED | OUTPATIENT
Start: 2024-04-26 | End: 2024-04-26

## 2024-04-26 RX ORDER — OXYCODONE HYDROCHLORIDE 5 MG/1
5 TABLET ORAL EVERY 6 HOURS PRN
Status: DISCONTINUED | OUTPATIENT
Start: 2024-04-26 | End: 2024-04-29 | Stop reason: HOSPADM

## 2024-04-26 RX ORDER — HEPARIN SODIUM 10000 [USP'U]/100ML
0-4000 INJECTION, SOLUTION INTRAVENOUS CONTINUOUS
Status: DISCONTINUED | OUTPATIENT
Start: 2024-04-26 | End: 2024-04-29

## 2024-04-26 RX ORDER — AMOXICILLIN AND CLAVULANATE POTASSIUM 875; 125 MG/1; MG/1
1 TABLET, FILM COATED ORAL EVERY 12 HOURS SCHEDULED
Status: DISCONTINUED | OUTPATIENT
Start: 2024-04-26 | End: 2024-04-28

## 2024-04-26 RX ORDER — ACETAMINOPHEN 325 MG/1
650 TABLET ORAL ONCE
Status: COMPLETED | OUTPATIENT
Start: 2024-04-26 | End: 2024-04-26

## 2024-04-26 RX ORDER — LEVOTHYROXINE SODIUM 100 UG/1
100 TABLET ORAL DAILY
Status: DISCONTINUED | OUTPATIENT
Start: 2024-04-27 | End: 2024-04-29 | Stop reason: HOSPADM

## 2024-04-26 RX ORDER — METOPROLOL SUCCINATE 25 MG/1
25 TABLET, EXTENDED RELEASE ORAL DAILY
Status: DISCONTINUED | OUTPATIENT
Start: 2024-04-27 | End: 2024-04-29 | Stop reason: HOSPADM

## 2024-04-26 RX ORDER — CITALOPRAM 20 MG/1
20 TABLET, FILM COATED ORAL DAILY
Status: DISCONTINUED | OUTPATIENT
Start: 2024-04-27 | End: 2024-04-29 | Stop reason: HOSPADM

## 2024-04-26 RX ORDER — NITROGLYCERIN 0.4 MG/1
TABLET SUBLINGUAL
Status: COMPLETED
Start: 2024-04-26 | End: 2024-04-26

## 2024-04-26 RX ORDER — HEPARIN SODIUM 10000 [USP'U]/100ML
INJECTION, SOLUTION INTRAVENOUS
Status: COMPLETED
Start: 2024-04-26 | End: 2024-04-26

## 2024-04-26 RX ORDER — CLOPIDOGREL BISULFATE 75 MG/1
75 TABLET ORAL DAILY
Qty: 90 TABLET | Refills: 3 | Status: SHIPPED | OUTPATIENT
Start: 2024-04-26

## 2024-04-26 RX ORDER — NAPROXEN SODIUM 220 MG/1
81 TABLET, FILM COATED ORAL DAILY
Status: DISCONTINUED | OUTPATIENT
Start: 2024-04-27 | End: 2024-04-29 | Stop reason: HOSPADM

## 2024-04-26 RX ORDER — ACETAMINOPHEN 325 MG/1
TABLET ORAL
Status: COMPLETED
Start: 2024-04-26 | End: 2024-04-26

## 2024-04-26 RX ORDER — LISINOPRIL 5 MG/1
2.5 TABLET ORAL DAILY
Status: DISCONTINUED | OUTPATIENT
Start: 2024-04-27 | End: 2024-04-27

## 2024-04-26 RX ORDER — HEPARIN SODIUM 5000 [USP'U]/ML
2000-4000 INJECTION, SOLUTION INTRAVENOUS; SUBCUTANEOUS EVERY 4 HOURS PRN
Status: DISCONTINUED | OUTPATIENT
Start: 2024-04-26 | End: 2024-04-26 | Stop reason: HOSPADM

## 2024-04-26 RX ORDER — OXYCODONE HYDROCHLORIDE 10 MG/1
10 TABLET ORAL EVERY 6 HOURS PRN
Status: DISCONTINUED | OUTPATIENT
Start: 2024-04-26 | End: 2024-04-29 | Stop reason: HOSPADM

## 2024-04-26 RX ADMIN — ACETAMINOPHEN 650 MG: 325 TABLET ORAL at 17:48

## 2024-04-26 RX ADMIN — MORPHINE SULFATE 2 MG: 4 INJECTION, SOLUTION INTRAMUSCULAR; INTRAVENOUS at 16:10

## 2024-04-26 RX ADMIN — HEPARIN SODIUM 925 UNITS/HR: 10000 INJECTION, SOLUTION INTRAVENOUS at 15:12

## 2024-04-26 RX ADMIN — SODIUM CHLORIDE, POTASSIUM CHLORIDE, SODIUM LACTATE AND CALCIUM CHLORIDE 1000 ML: 600; 310; 30; 20 INJECTION, SOLUTION INTRAVENOUS at 21:10

## 2024-04-26 RX ADMIN — NAPROXEN SODIUM 324 MG: 220 TABLET, FILM COATED ORAL at 11:25

## 2024-04-26 RX ADMIN — IOHEXOL 75 ML: 350 INJECTION, SOLUTION INTRAVENOUS at 13:38

## 2024-04-26 RX ADMIN — NITROGLYCERIN: 20 OINTMENT TOPICAL at 14:00

## 2024-04-26 RX ADMIN — OXYCODONE HYDROCHLORIDE AND ACETAMINOPHEN 1 TABLET: 5; 325 TABLET ORAL at 14:36

## 2024-04-26 RX ADMIN — ATORVASTATIN CALCIUM 80 MG: 80 TABLET, FILM COATED ORAL at 20:58

## 2024-04-26 RX ADMIN — HEPARIN SODIUM 930 UNITS/HR: 10000 INJECTION, SOLUTION INTRAVENOUS at 20:38

## 2024-04-26 RX ADMIN — OXYCODONE HYDROCHLORIDE 10 MG: 10 TABLET ORAL at 20:58

## 2024-04-26 RX ADMIN — CLOPIDOGREL 300 MG: 75 TABLET ORAL at 20:58

## 2024-04-26 RX ADMIN — CLOBETASOL PROPIONATE 1 APPLICATION: 0.5 CREAM TOPICAL at 21:46

## 2024-04-26 RX ADMIN — AMOXICILLIN AND CLAVULANATE POTASSIUM 1 TABLET: 875; 125 TABLET, FILM COATED ORAL at 20:58

## 2024-04-26 RX ADMIN — ASPIRIN 81 MG CHEWABLE TABLET 324 MG: 81 TABLET CHEWABLE at 11:25

## 2024-04-26 RX ADMIN — NITROGLYCERIN: 0.4 TABLET SUBLINGUAL at 11:58

## 2024-04-26 SDOH — SOCIAL STABILITY: SOCIAL INSECURITY: ABUSE: ADULT

## 2024-04-26 SDOH — SOCIAL STABILITY: SOCIAL INSECURITY: WERE YOU ABLE TO COMPLETE ALL THE BEHAVIORAL HEALTH SCREENINGS?: YES

## 2024-04-26 SDOH — SOCIAL STABILITY: SOCIAL INSECURITY: HAS ANYONE EVER THREATENED TO HURT YOUR FAMILY OR YOUR PETS?: NO

## 2024-04-26 SDOH — SOCIAL STABILITY: SOCIAL INSECURITY: DO YOU FEEL ANYONE HAS EXPLOITED OR TAKEN ADVANTAGE OF YOU FINANCIALLY OR OF YOUR PERSONAL PROPERTY?: NO

## 2024-04-26 SDOH — SOCIAL STABILITY: SOCIAL INSECURITY: HAVE YOU HAD THOUGHTS OF HARMING ANYONE ELSE?: NO

## 2024-04-26 SDOH — SOCIAL STABILITY: SOCIAL INSECURITY: HAVE YOU HAD ANY THOUGHTS OF HARMING ANYONE ELSE?: NO

## 2024-04-26 SDOH — SOCIAL STABILITY: SOCIAL INSECURITY: DO YOU FEEL UNSAFE GOING BACK TO THE PLACE WHERE YOU ARE LIVING?: NO

## 2024-04-26 SDOH — SOCIAL STABILITY: SOCIAL INSECURITY: ARE YOU OR HAVE YOU BEEN THREATENED OR ABUSED PHYSICALLY, EMOTIONALLY, OR SEXUALLY BY ANYONE?: NO

## 2024-04-26 SDOH — SOCIAL STABILITY: SOCIAL INSECURITY: ARE THERE ANY APPARENT SIGNS OF INJURIES/BEHAVIORS THAT COULD BE RELATED TO ABUSE/NEGLECT?: NO

## 2024-04-26 SDOH — SOCIAL STABILITY: SOCIAL INSECURITY: DOES ANYONE TRY TO KEEP YOU FROM HAVING/CONTACTING OTHER FRIENDS OR DOING THINGS OUTSIDE YOUR HOME?: NO

## 2024-04-26 ASSESSMENT — PAIN SCALES - GENERAL
PAINLEVEL_OUTOF10: 9
PAINLEVEL_OUTOF10: 8

## 2024-04-26 ASSESSMENT — COGNITIVE AND FUNCTIONAL STATUS - GENERAL
DAILY ACTIVITIY SCORE: 24
CLIMB 3 TO 5 STEPS WITH RAILING: A LITTLE
PATIENT BASELINE BEDBOUND: NO
MOBILITY SCORE: 23

## 2024-04-26 ASSESSMENT — ACTIVITIES OF DAILY LIVING (ADL)
TOILETING: INDEPENDENT
FEEDING YOURSELF: INDEPENDENT
HEARING - LEFT EAR: FUNCTIONAL
GROOMING: INDEPENDENT
DRESSING YOURSELF: INDEPENDENT
HEARING - RIGHT EAR: FUNCTIONAL
ADEQUATE_TO_COMPLETE_ADL: YES
BATHING: INDEPENDENT
WALKS IN HOME: INDEPENDENT
LACK_OF_TRANSPORTATION: NO
JUDGMENT_ADEQUATE_SAFELY_COMPLETE_DAILY_ACTIVITIES: YES
PATIENT'S MEMORY ADEQUATE TO SAFELY COMPLETE DAILY ACTIVITIES?: YES

## 2024-04-26 ASSESSMENT — PATIENT HEALTH QUESTIONNAIRE - PHQ9
2. FEELING DOWN, DEPRESSED OR HOPELESS: NOT AT ALL
1. LITTLE INTEREST OR PLEASURE IN DOING THINGS: NOT AT ALL
SUM OF ALL RESPONSES TO PHQ9 QUESTIONS 1 & 2: 0

## 2024-04-26 ASSESSMENT — PAIN DESCRIPTION - LOCATION
LOCATION: CHEST
LOCATION: TEETH

## 2024-04-26 ASSESSMENT — PAIN - FUNCTIONAL ASSESSMENT
PAIN_FUNCTIONAL_ASSESSMENT: 0-10

## 2024-04-26 ASSESSMENT — COLUMBIA-SUICIDE SEVERITY RATING SCALE - C-SSRS
6. HAVE YOU EVER DONE ANYTHING, STARTED TO DO ANYTHING, OR PREPARED TO DO ANYTHING TO END YOUR LIFE?: NO
1. IN THE PAST MONTH, HAVE YOU WISHED YOU WERE DEAD OR WISHED YOU COULD GO TO SLEEP AND NOT WAKE UP?: NO
6. HAVE YOU EVER DONE ANYTHING, STARTED TO DO ANYTHING, OR PREPARED TO DO ANYTHING TO END YOUR LIFE?: NO
2. HAVE YOU ACTUALLY HAD ANY THOUGHTS OF KILLING YOURSELF?: NO
2. HAVE YOU ACTUALLY HAD ANY THOUGHTS OF KILLING YOURSELF?: NO
1. IN THE PAST MONTH, HAVE YOU WISHED YOU WERE DEAD OR WISHED YOU COULD GO TO SLEEP AND NOT WAKE UP?: NO

## 2024-04-26 ASSESSMENT — LIFESTYLE VARIABLES
AUDIT-C TOTAL SCORE: 2
HOW MANY STANDARD DRINKS CONTAINING ALCOHOL DO YOU HAVE ON A TYPICAL DAY: 1 OR 2
SKIP TO QUESTIONS 9-10: 1
HOW OFTEN DO YOU HAVE 6 OR MORE DRINKS ON ONE OCCASION: NEVER
AUDIT-C TOTAL SCORE: 2
HOW OFTEN DO YOU HAVE A DRINK CONTAINING ALCOHOL: 2-4 TIMES A MONTH

## 2024-04-26 ASSESSMENT — PAIN DESCRIPTION - ORIENTATION
ORIENTATION: MID
ORIENTATION: LEFT;LOWER

## 2024-04-26 ASSESSMENT — PAIN DESCRIPTION - DESCRIPTORS
DESCRIPTORS: SHARP
DESCRIPTORS: SHARP

## 2024-04-26 ASSESSMENT — PAIN DESCRIPTION - FREQUENCY: FREQUENCY: CONSTANT/CONTINUOUS

## 2024-04-26 ASSESSMENT — PAIN DESCRIPTION - PROGRESSION: CLINICAL_PROGRESSION: NOT CHANGED

## 2024-04-26 ASSESSMENT — PAIN DESCRIPTION - ONSET: ONSET: SUDDEN

## 2024-04-26 ASSESSMENT — PAIN DESCRIPTION - PAIN TYPE: TYPE: ACUTE PAIN

## 2024-04-26 NOTE — ED PROCEDURE NOTE
Procedure  Critical Care    Performed by: Cholo Grant DO  Authorized by: Cholo Grant DO    Critical care provider statement:     Critical care time (minutes):  45    Critical care time was exclusive of:  Separately billable procedures and treating other patients    Critical care was necessary to treat or prevent imminent or life-threatening deterioration of the following conditions: NSTEMI/ Chest pain.    Critical care was time spent personally by me on the following activities:  Blood draw for specimens, development of treatment plan with patient or surrogate, discussions with consultants, examination of patient, evaluation of patient's response to treatment, ordering and performing treatments and interventions, ordering and review of laboratory studies, ordering and review of radiographic studies, pulse oximetry, re-evaluation of patient's condition and review of old charts    Care discussed with: accepting provider at another facility    Comments:      Discussed with cardiology DR ZOFIA Loredo  Discussed with Dr Perez hospitalist at [facility]  Discussed with patient and wife.  Assessed and reassessed  multiple times, currently pain free.               Cholo Grant DO  04/26/24 1550

## 2024-04-26 NOTE — ED PROVIDER NOTES
Department of Emergency Medicine   ED  Provider Note  Admit Date/RoomTime: 4/26/2024 10:27 AM  ED Room: 05/05                  History of Present Illness:   Aamir Grossman is a 68 y.o. male presenting to the ED for chest pain, beginning 930 this morning.  The complaint has been persistent, moderate in severity, and worsened by nothing.  Patient was originally started having some sharp stabbing chest pain in the center of his chest.  He says it did radiate through to his back.  He thinks it may be went to the left shoulder.  No shortness of breath.  No diaphoresis.  He had nausea and vomiting x 2.  No blood associated with this.  No diarrhea.  No black or tarry stools.  Says the pain is somewhat better now.  He does have history of coronary artery disease has had 3 previous stents the last 1 about 12 years ago.  No recent surgeries.  No recent travel.  He does smoke cigars on occasion.  Occasional glass of wine.  No marijuana or recreational drugs.  No recent travel.  No history of blood clots in his legs or his lungs.  He denies any URI symptoms.  No fever chills or cough.      Review of Systems:   Pertinent positives and review of systems as noted above.  Remaining 10 review of systems is negative or noncontributory to today's episode of care.  Review of Systems   A complete review of systems is otherwise negative except as noted above.    --------------------------------------------- PAST HISTORY ---------------------------------------------  Past Medical History:  has a past medical history of Coronary artery disease, Encounter for immunization (09/20/2016), Encounter for screening, unspecified (09/30/2015), GERD (gastroesophageal reflux disease), Hypertension, Hypothyroidism, Myocardial infarction (Multi), Other specified anxiety disorders, Personal history of diseases of the skin and subcutaneous tissue, Personal history of nicotine dependence (10/20/2014), Personal history of other diseases of the  circulatory system (06/10/2016), Personal history of other diseases of the circulatory system (01/11/2017), Personal history of other diseases of the circulatory system, Personal history of other diseases of the circulatory system, Personal history of other diseases of the respiratory system (12/30/2015), Personal history of other endocrine, nutritional and metabolic disease, Pure hypercholesterolemia, unspecified (10/25/2022), and Tendinopathy of left rotator cuff (06/13/2023).    Past Surgical History:  has a past surgical history that includes Tonsillectomy (05/29/2018); Rotator cuff repair (Left, 05/29/2018); Other surgical history (01/21/2014); Other surgical history (01/21/2014); Cardiac surgery; and Coronary stent placement.    Social History:  reports that he quit smoking about 24 years ago. His smoking use included cigarettes and cigars. He started smoking about 44 years ago. He has a 20 pack-year smoking history. He has never used smokeless tobacco. He reports current alcohol use. He reports that he does not use drugs.    Family History: family history includes Coronary artery disease in his father; Hyperlipidemia in his mother; Hypertension in his mother; Osteoporosis in his mother. Unless otherwise noted, family history is non contributory    Patient's Medications   New Prescriptions    No medications on file   Previous Medications    ASPIRIN 81 MG EC TABLET    Take 1 tablet (81 mg) by mouth once daily.    CITALOPRAM (CELEXA) 20 MG TABLET    Take 1 tablet (20 mg) by mouth once daily.    CLOBETASOL (TEMOVATE) 0.05 % OINTMENT        CLOPIDOGREL (PLAVIX) 75 MG TABLET    Take 1 tablet (75 mg) by mouth once daily.    IXEKIZUMAB (TALTZ SYRINGE) 80 MG/ML INJECTION    Inject 1 mL (80 mg) under the skin.    LEVOTHYROXINE (SYNTHROID, LEVOXYL) 100 MCG TABLET    TAKE 1 TABLET DAILY    LISINOPRIL 2.5 MG TABLET    Take 1 tablet (2.5 mg) by mouth once daily.    METOPROLOL SUCCINATE XL (TOPROL-XL) 25 MG 24 HR TABLET     Take 1 tablet (25 mg) by mouth once daily.    OMEPRAZOLE (PRILOSEC) 40 MG DR CAPSULE    Take 1 capsule (40 mg) by mouth once daily in the morning. Take before meals. Do not crush or chew.    ROSUVASTATIN (CRESTOR) 20 MG TABLET    Take 1 tablet (20 mg) by mouth once daily.   Modified Medications    No medications on file   Discontinued Medications    No medications on file      The patient’s home medications have been reviewed.    Allergies: Penicillin    -------------------------------------------------- RESULTS -------------------------------------------------  All laboratory and radiology results have been personally reviewed by myself   LABS:  Labs Reviewed   CBC WITH AUTO DIFFERENTIAL - Abnormal       Result Value    WBC 8.3      nRBC 0.0      RBC 3.87 (*)     Hemoglobin 14.3      Hematocrit 40.6 (*)      (*)     MCH 37.0 (*)     MCHC 35.2      RDW 11.1 (*)     Platelets 109 (*)     Neutrophils % 74.9      Immature Granulocytes %, Automated 0.2      Lymphocytes % 14.5      Monocytes % 8.7      Eosinophils % 1.3      Basophils % 0.4      Neutrophils Absolute 6.21      Immature Granulocytes Absolute, Automated 0.02      Lymphocytes Absolute 1.20      Monocytes Absolute 0.72      Eosinophils Absolute 0.11      Basophils Absolute 0.03     COMPREHENSIVE METABOLIC PANEL - Abnormal    Glucose 96      Sodium 136      Potassium 4.5      Chloride 101      Bicarbonate 21      Anion Gap 19      Urea Nitrogen 20      Creatinine 1.33 (*)     eGFR 58 (*)     Calcium 9.9      Albumin 4.5      Alkaline Phosphatase 93      Total Protein 7.4      AST 33      Bilirubin, Total 0.9      ALT 33     SERIAL TROPONIN, 1 HOUR - Abnormal    Troponin I, High Sensitivity 27 (*)     Narrative:     Less than 99th percentile of normal range cutoff-  Female and children under 18 years old <14 ng/L; Male <21 ng/L: Negative  Repeat testing should be performed if clinically indicated.     Female and children under 18 years old 14-50 ng/L;  Male 21-50 ng/L:  Consistent with possible cardiac damage and possible increased clinical   risk. Serial measurements may help to assess extent of myocardial damage.     >50 ng/L: Consistent with cardiac damage, increased clinical risk and  myocardial infarction. Serial measurements may help assess extent of   myocardial damage.      NOTE: Children less than 1 year old may have higher baseline troponin   levels and results should be interpreted in conjunction with the overall   clinical context.     NOTE: Troponin I testing is performed using a different   testing methodology at [facility] than at other   [facility]. Direct result comparisons should only   be made within the same method.   D-DIMER, VTE EXCLUSION - Abnormal    D-Dimer, Quantitative VTE Exclusion 549 (*)     Narrative:     The VTE Exclusion D-Dimer assay is reported in ng/mL Fibrinogen Equivalent Units (FEU).    Per 's instructions for use, a value of less than 500 ng/mL (FEU) may help to exclude DVT or PE in outpatients when the assay is used with a clinical pretest probability assessment.([facility] must utilize and document eCalc 'Wells Score Deep Vein Thrombosis Risk' for DVT exclusion only. Emergency Department should utilize  Guidelines for Emergency Department Use of the VTE Exclusion D-Dimer and Clinical Pretest probability assessment model for DVT or PE exclusion.)   TROPONIN I, HIGH SENSITIVITY - Abnormal    Troponin I, High Sensitivity 616 (*)     Narrative:     Less than 99th percentile of normal range cutoff-  Female and children under 18 years old <14 ng/L; Male <21 ng/L: Negative  Repeat testing should be performed if clinically indicated.     Female and children under 18 years old 14-50 ng/L; Male 21-50 ng/L:  Consistent with possible cardiac damage and possible increased clinical   risk. Serial measurements may help to assess extent of myocardial damage.     >50 ng/L: Consistent with cardiac damage,  increased clinical risk and  myocardial infarction. Serial measurements may help assess extent of   myocardial damage.      NOTE: Children less than 1 year old may have higher baseline troponin   levels and results should be interpreted in conjunction with the overall   clinical context.     NOTE: Troponin I testing is performed using a different   testing methodology at [facility] than at other   [facility]. Direct result comparisons should only   be made within the same method.   MAGNESIUM - Normal    Magnesium 1.89     SARS-COV-2 AND INFLUENZA A/B PCR - Normal    Flu A Result Not Detected      Flu B Result Not Detected      Coronavirus 2019, PCR Not Detected      Narrative:     This assay has received FDA Emergency Use Authorization (EUA) and  is only authorized for the duration of time that circumstances exist to justify the authorization of the emergency use of in vitro diagnostic tests for the detection of SARS-CoV-2 virus and/or diagnosis of COVID-19 infection under section 564(b)(1) of the Act, 21 U.S.C. 360bbb-3(b)(1). Testing for SARS-CoV-2 is only recommended for patients who meet current clinical and/or epidemiological criteria as defined by federal, state, or local public health directives. This assay is an in vitro diagnostic nucleic acid amplification test for the qualitative detection of SARS-CoV-2, Influenza A, and Influenza B from nasopharyngeal specimens and has been validated for use at [facility]. Negative results do not preclude COVID-19 infections or Influenza A/B infections, and should not be used as the sole basis for diagnosis, treatment, or other management decisions. If Influenza A/B and RSV PCR results are negative, testing for Parainfluenza virus, Adenovirus and Metapneumovirus is routinely performed for [facility] pediatric oncology and intensive care inpatients, and is available on other patients by placing an add-on request.    SERIAL  TROPONIN-INITIAL - Normal    Troponin I, High Sensitivity 15      Narrative:     Less than 99th percentile of normal range cutoff-  Female and children under 18 years old <14 ng/L; Male <21 ng/L: Negative  Repeat testing should be performed if clinically indicated.     Female and children under 18 years old 14-50 ng/L; Male 21-50 ng/L:  Consistent with possible cardiac damage and possible increased clinical   risk. Serial measurements may help to assess extent of myocardial damage.     >50 ng/L: Consistent with cardiac damage, increased clinical risk and  myocardial infarction. Serial measurements may help assess extent of   myocardial damage.      NOTE: Children less than 1 year old may have higher baseline troponin   levels and results should be interpreted in conjunction with the overall   clinical context.     NOTE: Troponin I testing is performed using a different   testing methodology at [facility] than at other   [facility]. Direct result comparisons should only   be made within the same method.   B-TYPE NATRIURETIC PEPTIDE - Normal    BNP 31      Narrative:        <100 pg/mL - Heart failure unlikely  100-299 pg/mL - Intermediate probability of acute heart                  failure exacerbation. Correlate with clinical                  context and patient history.    >=300 pg/mL - Heart Failure likely. Correlate with clinical                  context and patient history.    BNP testing is performed using different testing methodology at [facility] than at other [facility]. Direct result comparisons should only be made within the same method.      APTT - Normal    aPTT 34      Narrative:     The APTT is no longer used for monitoring Unfractionated Heparin Therapy. For monitoring Heparin Therapy, use the Heparin Assay.   TROPONIN SERIES- (INITIAL, 1 HR)    Narrative:     The following orders were created for panel order Troponin I Series, High Sensitivity (0, 1  "HR).  Procedure                               Abnormality         Status                     ---------                               -----------         ------                     Troponin I, High Sensiti...[[id number]]  Normal              Final result               Troponin, High Sensitivi...[[id number]]  Abnormal            Final result                 Please view results for these tests on the individual orders.         RADIOLOGY:  Interpreted by Radiologist.  CT angio chest for pulmonary embolism   Final Result   1.No evidence of acute pulmonary embolus or thoracic aortic   aneurysm or dissection.   2.Normal heart size with coronary artery calcifications.   3.Dependent atelectasis in both lungs.   4.Subacute fractures of the left ninth and 10th ribs posteriorly   and subacute or chronic fracture of the left 11th rib posteriorly.   Signed by Emilee Zavaleta DO      XR chest 1 view   Final Result   No consolidation or effusion.   Signed by Stefan Magaña MD          Encounter Date: 04/26/24   ECG 12 lead   Result Value    Ventricular Rate 54    Atrial Rate 54    NM Interval 188    QRS Duration 90    QT Interval 440    QTC Calculation(Bazett) 417    P Axis 47    R Axis -35    T Axis 33    QRS Count 9    Q Onset 223    P Onset 129    P Offset 185    T Offset 443    QTC Fredericia 424    Narrative    Sinus bradycardia  Left axis deviation  Abnormal ECG  When compared with ECG of 15-JONATHAN-2023 12:54,  No significant change was found  See ED provider note for full interpretation and clinical correlation  Confirmed by Staci Roach ([id number]) on 4/26/2024 12:54:10 PM     ------------------------- NURSING NOTES AND VITALS REVIEWED ---------------------------   The nursing notes within the ED encounter and vital signs as below have been reviewed.   BP (!) 135/91   Pulse 54   Temp 36.5 °C (97.7 °F) (Oral)   Resp 15   Ht 1.753 m (5' 9\")   Wt 77.1 kg (170 lb)   SpO2 100%   BMI 25.10 kg/m²   Oxygen Saturation " Interpretation: Normal      ---------------------------------------------------PHYSICAL EXAM--------------------------------------  Physical Exam  Vitals and nursing note reviewed.   Constitutional:       General: He is not in acute distress.     Appearance: He is well-developed. He is not ill-appearing or toxic-appearing.   HENT:      Head: Normocephalic and atraumatic.      Nose: Nose normal.      Mouth/Throat:      Mouth: Mucous membranes are moist.      Pharynx: Oropharynx is clear. No oropharyngeal exudate or posterior oropharyngeal erythema.   Eyes:      General: No scleral icterus.     Extraocular Movements: Extraocular movements intact.      Conjunctiva/sclera: Conjunctivae normal.      Pupils: Pupils are equal, round, and reactive to light.   Cardiovascular:      Rate and Rhythm: Normal rate and regular rhythm.      Heart sounds: Normal heart sounds. No murmur heard.     No friction rub. No gallop.   Pulmonary:      Effort: Pulmonary effort is normal. No respiratory distress.      Breath sounds: Normal breath sounds. No wheezing, rhonchi or rales.   Abdominal:      General: There is no distension.      Palpations: Abdomen is soft. There is no mass.      Tenderness: There is no abdominal tenderness. There is no right CVA tenderness, left CVA tenderness, guarding or rebound.      Hernia: No hernia is present.   Musculoskeletal:         General: No swelling, tenderness, deformity or signs of injury.      Cervical back: Normal range of motion and neck supple. No rigidity or tenderness.      Right lower leg: No edema.      Left lower leg: No edema.   Lymphadenopathy:      Cervical: No cervical adenopathy.   Skin:     General: Skin is warm and dry.      Capillary Refill: Capillary refill takes less than 2 seconds.      Findings: No rash.   Neurological:      General: No focal deficit present.      Mental Status: He is alert and oriented to person, place, and time.   Psychiatric:         Mood and Affect: Mood  normal.            Procedures  None  ------------------------------ ED COURSE/MEDICAL DECISION MAKING----------------------    Medical Decision Making:   Patient was seen and examined by me.  An IV is started and appropriate labs have been obtained.  Differential diagnosis includes angina, ACS, pneumothorax, PE, pneumonia, pleuritis, GERD, pancreatitis, cholecystitis, etc.      ED Course as of 04/26/24 1548   Fri Apr 26, 2024   1117 An EKG at 1030 interpreted by me at 1037.  Sinus bradycardia 50 bpm.  Left axis deviation.   ms, QRS 90 ms,  ms.  No acute ST-T change.  No evidence of acute ischemia.  No STEMI. [EC]   1250 Repeat EKG 1132 interpreted by me 1137.  Sinus bradycardia 53 bpm.  Axis normal.  MN, QRS, QT intervals are normal.  No acute ST-T change.  No evidence of acute ischemia no STEMI. [EC]   1251 A 30 EKG was done at 1203 interpreted by me at 1210.  Sinus bradycardia 58 bpm.  Left axis deviation.   ms, QRS 80 ms,  ms.  There is no acute ST-T change.  No evidence of acute ischemia.  No STEMI. [EC]   1252 CBC and Auto Differential(!)  CBC is unremarkable. [EC]   1252 Comprehensive Metabolic Panel(!)  Comprehensive metabolic panel is unremarkable. [EC]   1252 First troponin is 15, second troponin 27 [EC]   1252 D-dimer, VTE Exclusion(!)  D-dimer is 549 but age-adjusted cut off is 700 making this abnormal value [EC]   1253 Sars-CoV-2 and Influenza A/B PCR  Influenza A not detected  Influenza B not detected  Coronavirus not detected [EC]   1253 Magnesium  Magnesium is normal at 1.89 [EC]   1253 I am obtaining a CT of the chest to rule out PE although clinically I think this is unlikely his sats are 98 to 100%. [EC]   1404 First troponin 15, second troponin 27, third troponin 616 [EC]   1404 Patient will have 1/2 inch Nitropaste placed on the chest wall. [EC]   1404 Patient will be started on heparin [EC]   1503 CT chest  IMPRESSION:  1.No evidence of acute pulmonary embolus or thoracic  aortic  aneurysm or dissection.  2.Normal heart size with coronary artery calcifications.  3.Dependent atelectasis in both lungs.  4.Subacute fractures of the left ninth and 10th ribs posteriorly  and subacute or chronic fracture of the left 11th rib posteriorly.  Signed by Emilee Zavaleta DO   [EC]   1546 I discussed the case with the patient's cardiologist Dr. ISRAEL Loredo.  Will transfer to [facility].    I discussed the case with Dr. Perez at [facility] and the patient was accepted in transfer there.    I have discussed the findings of all testing with the patient and his wife they verbalized understanding.  Patient has been started on low intensity heparin.  He is already received aspirin.  He has 1/2 inch of Nitropaste on the anterior chest wall.  The patient is agreeable to transfer at this time. [EC]      ED Course User Index  [EC] Cholo Grant DO         Diagnoses as of 04/26/24 1548   NSTEMI (non-ST elevated myocardial infarction) (Multi)      Counseling:   The emergency provider has spoken with the patient and wife  and discussed today’s results, in addition to providing specific details for the plan of care and counseling regarding the diagnosis and prognosis.  Questions are answered at this time and they are agreeable with the plan.      --------------------------------- IMPRESSION AND DISPOSITION ---------------------------------        IMPRESSION  1. NSTEMI (non-ST elevated myocardial infarction) (Multi)        DISPOSITION  Disposition: Transfer to [facility] to Dr. Perez hospitalist/Dr. [facility]  Patient condition is stable      Billing Provider Critical Care Time: 45 minutes     Cholo Grant DO  04/26/24 1548

## 2024-04-26 NOTE — Clinical Note
Catheter exchanged with 5FR X 100CM INFINITI TL DIAGNOSTIC CATHETER, 3DRC, FEMORAL SELECTIVE JOSE MOLINA RIGHT OR NO TORQUE, 0.038IN MAX GUIDEWIRE (EA/1).

## 2024-04-27 ENCOUNTER — APPOINTMENT (OUTPATIENT)
Dept: RADIOLOGY | Facility: HOSPITAL | Age: 69
DRG: 281 | End: 2024-04-27
Payer: MEDICARE

## 2024-04-27 LAB
ALBUMIN SERPL BCP-MCNC: 4 G/DL (ref 3.4–5)
ANION GAP SERPL CALC-SCNC: 14 MMOL/L (ref 10–20)
BUN SERPL-MCNC: 16 MG/DL (ref 6–23)
CALCIUM SERPL-MCNC: 9 MG/DL (ref 8.6–10.3)
CARDIAC TROPONIN I PNL SERPL HS: 2573 NG/L (ref 0–20)
CARDIAC TROPONIN I PNL SERPL HS: 3785 NG/L (ref 0–20)
CHLORIDE SERPL-SCNC: 99 MMOL/L (ref 98–107)
CO2 SERPL-SCNC: 23 MMOL/L (ref 21–32)
CREAT SERPL-MCNC: 0.76 MG/DL (ref 0.5–1.3)
EGFRCR SERPLBLD CKD-EPI 2021: >90 ML/MIN/1.73M*2
ERYTHROCYTE [DISTWIDTH] IN BLOOD BY AUTOMATED COUNT: 10.8 % (ref 11.5–14.5)
GLUCOSE SERPL-MCNC: 76 MG/DL (ref 74–99)
HCT VFR BLD AUTO: 37.3 % (ref 41–52)
HGB BLD-MCNC: 12.9 G/DL (ref 13.5–17.5)
MAGNESIUM SERPL-MCNC: 1.8 MG/DL (ref 1.6–2.4)
MCH RBC QN AUTO: 36.2 PG (ref 26–34)
MCHC RBC AUTO-ENTMCNC: 34.6 G/DL (ref 32–36)
MCV RBC AUTO: 105 FL (ref 80–100)
NRBC BLD-RTO: 0 /100 WBCS (ref 0–0)
PHOSPHATE SERPL-MCNC: 3.4 MG/DL (ref 2.5–4.9)
PLATELET # BLD AUTO: 86 X10*3/UL (ref 150–450)
POTASSIUM SERPL-SCNC: 4.1 MMOL/L (ref 3.5–5.3)
RBC # BLD AUTO: 3.56 X10*6/UL (ref 4.5–5.9)
SODIUM SERPL-SCNC: 132 MMOL/L (ref 136–145)
UFH PPP CHRO-ACNC: 0.3 IU/ML
UFH PPP CHRO-ACNC: 0.4 IU/ML
WBC # BLD AUTO: 6.8 X10*3/UL (ref 4.4–11.3)

## 2024-04-27 PROCEDURE — 36415 COLL VENOUS BLD VENIPUNCTURE: CPT | Performed by: INTERNAL MEDICINE

## 2024-04-27 PROCEDURE — 83735 ASSAY OF MAGNESIUM: CPT

## 2024-04-27 PROCEDURE — 85027 COMPLETE CBC AUTOMATED: CPT

## 2024-04-27 PROCEDURE — 2500000001 HC RX 250 WO HCPCS SELF ADMINISTERED DRUGS (ALT 637 FOR MEDICARE OP)

## 2024-04-27 PROCEDURE — 2500000004 HC RX 250 GENERAL PHARMACY W/ HCPCS (ALT 636 FOR OP/ED)

## 2024-04-27 PROCEDURE — 76536 US EXAM OF HEAD AND NECK: CPT

## 2024-04-27 PROCEDURE — 84484 ASSAY OF TROPONIN QUANT: CPT

## 2024-04-27 PROCEDURE — 85520 HEPARIN ASSAY: CPT | Mod: 91 | Performed by: INTERNAL MEDICINE

## 2024-04-27 PROCEDURE — C9113 INJ PANTOPRAZOLE SODIUM, VIA: HCPCS

## 2024-04-27 PROCEDURE — 36415 COLL VENOUS BLD VENIPUNCTURE: CPT

## 2024-04-27 PROCEDURE — 99232 SBSQ HOSP IP/OBS MODERATE 35: CPT | Performed by: INTERNAL MEDICINE

## 2024-04-27 PROCEDURE — 85520 HEPARIN ASSAY: CPT

## 2024-04-27 PROCEDURE — 84100 ASSAY OF PHOSPHORUS: CPT

## 2024-04-27 PROCEDURE — 1200000002 HC GENERAL ROOM WITH TELEMETRY DAILY

## 2024-04-27 PROCEDURE — 76536 US EXAM OF HEAD AND NECK: CPT | Performed by: RADIOLOGY

## 2024-04-27 RX ORDER — ACETAMINOPHEN 160 MG/5ML
650 SOLUTION ORAL EVERY 4 HOURS PRN
Status: DISCONTINUED | OUTPATIENT
Start: 2024-04-27 | End: 2024-04-29 | Stop reason: HOSPADM

## 2024-04-27 RX ORDER — ACETAMINOPHEN 325 MG/1
650 TABLET ORAL EVERY 4 HOURS PRN
Status: DISCONTINUED | OUTPATIENT
Start: 2024-04-27 | End: 2024-04-29 | Stop reason: HOSPADM

## 2024-04-27 RX ORDER — LISINOPRIL 5 MG/1
5 TABLET ORAL DAILY
Status: DISCONTINUED | OUTPATIENT
Start: 2024-04-28 | End: 2024-04-29 | Stop reason: HOSPADM

## 2024-04-27 RX ORDER — ACETAMINOPHEN 650 MG/1
650 SUPPOSITORY RECTAL EVERY 4 HOURS PRN
Status: DISCONTINUED | OUTPATIENT
Start: 2024-04-27 | End: 2024-04-29 | Stop reason: HOSPADM

## 2024-04-27 RX ADMIN — CLOPIDOGREL 75 MG: 75 TABLET ORAL at 20:28

## 2024-04-27 RX ADMIN — AMOXICILLIN AND CLAVULANATE POTASSIUM 1 TABLET: 875; 125 TABLET, FILM COATED ORAL at 08:23

## 2024-04-27 RX ADMIN — CITALOPRAM HYDROBROMIDE 20 MG: 20 TABLET ORAL at 08:23

## 2024-04-27 RX ADMIN — CLOBETASOL PROPIONATE: 0.5 CREAM TOPICAL at 08:24

## 2024-04-27 RX ADMIN — AMOXICILLIN AND CLAVULANATE POTASSIUM 1 TABLET: 875; 125 TABLET, FILM COATED ORAL at 20:28

## 2024-04-27 RX ADMIN — OXYCODONE 5 MG: 5 TABLET ORAL at 22:37

## 2024-04-27 RX ADMIN — ACETAMINOPHEN 650 MG: 325 TABLET ORAL at 13:43

## 2024-04-27 RX ADMIN — ATORVASTATIN CALCIUM 80 MG: 80 TABLET, FILM COATED ORAL at 20:28

## 2024-04-27 RX ADMIN — OXYCODONE HYDROCHLORIDE 10 MG: 10 TABLET ORAL at 16:55

## 2024-04-27 RX ADMIN — LEVOTHYROXINE SODIUM 100 MCG: 100 TABLET ORAL at 05:03

## 2024-04-27 RX ADMIN — PANTOPRAZOLE SODIUM 40 MG: 40 INJECTION, POWDER, FOR SOLUTION INTRAVENOUS at 05:03

## 2024-04-27 RX ADMIN — OXYCODONE HYDROCHLORIDE 10 MG: 10 TABLET ORAL at 02:17

## 2024-04-27 RX ADMIN — METOPROLOL SUCCINATE 25 MG: 25 TABLET, EXTENDED RELEASE ORAL at 08:23

## 2024-04-27 RX ADMIN — HEPARIN SODIUM 930 UNITS/HR: 10000 INJECTION, SOLUTION INTRAVENOUS at 15:47

## 2024-04-27 RX ADMIN — OXYCODONE HYDROCHLORIDE 10 MG: 10 TABLET ORAL at 08:23

## 2024-04-27 RX ADMIN — ASPIRIN 81 MG 81 MG: 81 TABLET ORAL at 08:23

## 2024-04-27 RX ADMIN — CLOBETASOL PROPIONATE: 0.5 CREAM TOPICAL at 21:00

## 2024-04-27 ASSESSMENT — PAIN SCALES - GENERAL
PAINLEVEL_OUTOF10: 3
PAINLEVEL_OUTOF10: 8
PAINLEVEL_OUTOF10: 8
PAINLEVEL_OUTOF10: 2
PAINLEVEL_OUTOF10: 8

## 2024-04-27 ASSESSMENT — COGNITIVE AND FUNCTIONAL STATUS - GENERAL
MOBILITY SCORE: 24
CLIMB 3 TO 5 STEPS WITH RAILING: A LITTLE
DAILY ACTIVITIY SCORE: 24
MOBILITY SCORE: 23

## 2024-04-27 ASSESSMENT — ENCOUNTER SYMPTOMS
FEVER: 0
NECK STIFFNESS: 0
CHILLS: 0
NECK PAIN: 0
NAUSEA: 0
FATIGUE: 0
COLOR CHANGE: 0
LIGHT-HEADEDNESS: 0
ARTHRALGIAS: 0
CHEST TIGHTNESS: 0
VOMITING: 0
ABDOMINAL DISTENTION: 0
BACK PAIN: 0
APNEA: 0
COUGH: 0
BLOOD IN STOOL: 0
PALPITATIONS: 0
SHORTNESS OF BREATH: 0
POLYDIPSIA: 0
POLYPHAGIA: 0
APPETITE CHANGE: 0
FREQUENCY: 0
DIARRHEA: 0
STRIDOR: 0
WHEEZING: 0
DIZZINESS: 0
MYALGIAS: 0
ACTIVITY CHANGE: 0
JOINT SWELLING: 0
ABDOMINAL PAIN: 0

## 2024-04-27 ASSESSMENT — PAIN DESCRIPTION - ORIENTATION
ORIENTATION: LEFT

## 2024-04-27 ASSESSMENT — ACTIVITIES OF DAILY LIVING (ADL): LACK_OF_TRANSPORTATION: NO

## 2024-04-27 ASSESSMENT — PAIN - FUNCTIONAL ASSESSMENT
PAIN_FUNCTIONAL_ASSESSMENT: 0-10

## 2024-04-27 ASSESSMENT — PAIN DESCRIPTION - LOCATION
LOCATION: FACE
LOCATION: JAW
LOCATION: JAW

## 2024-04-27 NOTE — H&P (VIEW-ONLY)
Inpatient consult to cardiology  Consult performed by: Savannah Smith, BILL-CNP  Consult ordered by: Faheem Mathew MD  Reason for consult: NSTEMI          Reason For Consult  nstemi    History Of Present Illness  Aamir Grossman is a 68 y.o. male presenting with chest pain, he reports sudden onset around 930 am described as sharp stabbing pain, mid sternal that radiated to back neck and left shoulder. He reports the pain lasted 6-8 hours and was constant until he took nitroglycerin and hasn't had the pain since.   He is also complaining of tooth pain and swelling. Upon arrival to the ER a 12 lead EKG was performed and showed sinus bradycardia with no ST elevation or depression. Troponins were elevated 27->616->5466->3785->2573. He was given aspirin, plavix, atorvastatin, metoprolol and started on a heparin gtt per ACS protocol. He was admitted for further management.       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 His smoking use included cigarettes and cigars. He started smoking about 44 years ago. He has a 20 pack-year smoking history. He has never used smokeless tobacco. He reports current alcohol use. He reports that he does not use drugs.    Family History  Family History   Problem Relation Name Age of Onset    Hypertension Mother      Hyperlipidemia Mother      Osteoporosis Mother      Coronary artery disease Father          Allergies  Penicillin    Review of Systems  Review of Systems   Constitutional:  Negative for activity change, appetite change, chills, fatigue and  fever.   HENT:  Positive for dental problem. Negative for congestion.    Respiratory:  Negative for apnea, cough, chest tightness, shortness of breath, wheezing and stridor.    Cardiovascular:  Positive for chest pain. Negative for palpitations and leg swelling.   Gastrointestinal:  Negative for abdominal distention, abdominal pain, blood in stool, diarrhea, nausea and vomiting.   Endocrine: Negative for cold intolerance, heat intolerance, polydipsia, polyphagia and polyuria.   Genitourinary:  Negative for frequency and urgency.   Musculoskeletal:  Negative for arthralgias, back pain, gait problem, joint swelling, myalgias, neck pain and neck stiffness.   Skin:  Negative for color change and pallor.   Neurological:  Negative for dizziness and light-headedness.   Psychiatric/Behavioral:  Negative for behavioral problems.          Physical Exam  Physical Exam  Constitutional:       Appearance: Normal appearance.   HENT:      Head: Normocephalic and atraumatic.      Nose: Nose normal. No congestion.   Eyes:      Extraocular Movements: Extraocular movements intact.      Pupils: Pupils are equal, round, and reactive to light.   Cardiovascular:      Rate and Rhythm: Normal rate and regular rhythm.      Pulses: Normal pulses.      Heart sounds: Normal heart sounds. No murmur heard.     No friction rub. No gallop.   Pulmonary:      Effort: Pulmonary effort is normal. No tachypnea, bradypnea, accessory muscle usage or respiratory distress.      Breath sounds: Normal breath sounds. No stridor. No wheezing, rhonchi or rales.   Chest:      Chest wall: No tenderness.   Abdominal:      General: Bowel sounds are normal. There is no distension.      Palpations: Abdomen is soft.      Tenderness: There is no abdominal tenderness.   Musculoskeletal:         General: Normal range of motion.      Cervical back: Normal range of motion and neck supple.   Skin:     General: Skin is warm and dry.      Capillary Refill: Capillary refill  takes less than 2 seconds.   Neurological:      General: No focal deficit present.      Mental Status: He is alert and oriented to person, place, and time. Mental status is at baseline.   Psychiatric:         Mood and Affect: Mood normal.         Behavior: Behavior normal.             Last Recorded Vitals  /88 (BP Location: Left arm, Patient Position: Lying)   Pulse 66   Temp 36.9 °C (98.4 °F) (Temporal)   Resp 18   Wt 74.3 kg (163 lb 12.8 oz)   SpO2 97%     Relevant Results  Results for orders placed or performed during the hospital encounter of 04/26/24 (from the past 24 hour(s))   Troponin I, High Sensitivity   Result Value Ref Range    Troponin I, High Sensitivity 5,466 (HH) 0 - 20 ng/L   Heparin Assay, UFH   Result Value Ref Range    Heparin Unfractionated 0.3 See Comment Below for Therapeutic Ranges IU/mL   Protime-INR   Result Value Ref Range    Protime 11.1 9.8 - 12.8 seconds    INR 1.0 0.9 - 1.1   Heparin Assay, UFH   Result Value Ref Range    Heparin Unfractionated 0.3 See Comment Below for Therapeutic Ranges IU/mL   Troponin I, High Sensitivity   Result Value Ref Range    Troponin I, High Sensitivity 3,785 (HH) 0 - 20 ng/L   CBC   Result Value Ref Range    WBC 6.8 4.4 - 11.3 x10*3/uL    nRBC 0.0 0.0 - 0.0 /100 WBCs    RBC 3.56 (L) 4.50 - 5.90 x10*6/uL    Hemoglobin 12.9 (L) 13.5 - 17.5 g/dL    Hematocrit 37.3 (L) 41.0 - 52.0 %     (H) 80 - 100 fL    MCH 36.2 (H) 26.0 - 34.0 pg    MCHC 34.6 32.0 - 36.0 g/dL    RDW 10.8 (L) 11.5 - 14.5 %    Platelets 86 (L) 150 - 450 x10*3/uL   Magnesium   Result Value Ref Range    Magnesium 1.80 1.60 - 2.40 mg/dL   Renal Function Panel   Result Value Ref Range    Glucose 76 74 - 99 mg/dL    Sodium 132 (L) 136 - 145 mmol/L    Potassium 4.1 3.5 - 5.3 mmol/L    Chloride 99 98 - 107 mmol/L    Bicarbonate 23 21 - 32 mmol/L    Anion Gap 14 10 - 20 mmol/L    Urea Nitrogen 16 6 - 23 mg/dL    Creatinine 0.76 0.50 - 1.30 mg/dL    eGFR >90 >60 mL/min/1.73m*2     Calcium 9.0 8.6 - 10.3 mg/dL    Phosphorus 3.4 2.5 - 4.9 mg/dL    Albumin 4.0 3.4 - 5.0 g/dL   Troponin I, High Sensitivity   Result Value Ref Range    Troponin I, High Sensitivity 2,573 (HH) 0 - 20 ng/L   Heparin Assay   Result Value Ref Range    Heparin Unfractionated 0.4 See Comment Below for Therapeutic Ranges IU/mL        === 04/26/24 ===    XR CHEST 1 VIEW    - Impression -  No consolidation or effusion.  Signed by Stefan Magaña MD     No echocardiogram results found for the past 12 months     Patient Active Problem List   Diagnosis    Abnormal platelets (Multi)    Anemia    Arthritis of left temporomandibular joint    Benign hypertension    CAD (coronary artery disease)    Depression, major, in remission (CMS-[facility])    Erectile dysfunction    Acquired hypothyroidism    Psoriatic arthropathy (Multi)    Vasovagal episode    Vitamin D deficiency    Screening for colon cancer    Tendinopathy of left rotator cuff    Other forms of angina pectoris (CMS-[facility])    Gastroesophageal reflux disease without esophagitis    Cervical dysphagia    Cricopharyngeal dysphagia    Hypertensive heart disease with heart failure (Multi)    NSTEMI (non-ST elevated myocardial infarction) (Multi)          Assessment/Plan      6/16/23 Echocardiogram  CONCLUSIONS:  1. Left ventricular systolic function is normal with a 60% estimated ejection fraction.  2. Apical septal segment and mid anteroseptal segment are abnormal.  3. Spectral Doppler shows an impaired relaxation pattern of left ventricular diastolic filling.  4. There is ischemic cardiomyopathy.    4/14/2022 Stress Test: severely reduced uptake in the distal anterior wall and apex, moderately reduced uptake in the mid to distal septum. Scar in the distal anterior wall, apex, distal septum, no ischemia    NSTEMI/ Elevated Troponin/ Coronary artery disease  - s/p anterior myocardial infarction  - Hx of CAD with stent placement to LAD x 3 in 2013  - Troponin elevated  27->616->5466->3785->2573  - I reviewed the EKG, no acute changes, ST elevation or depression  -CT chest angio negative for PE  - Echocardiogram reviewed as above  - Plan for LHC on Monday  - Continue heparin gtt  - continue aspirin, plavix, metoprolol, and atorvastatin    2. Chronic diastolic and systolic CHF, ICMO  -CXR negative  -I reviewed the Echocardiogram from 6/23, EF 60%, ICMO, apical septal segment and mid anteroseptal segment ar abnormal  -Strict I & Os  -Daily weights  -2gm na diet  -Cont metoprolol  - Lisinopril held for creatinine elevation    3. Hypertension  -stable  -2gm na diet  -lisinopril held for elevated creatinine, now creatinine normal. Recommend resuming lisinopril for hypertension  -monitor     4. Hyperlipidemia  -Cont statin     5. Acute kidney injury  -Increased most likely from increased NSAID use    6. Tooth Infection  - creatinine most likely increased from NSAID use  - Continue antibiotic  - Ok to continue with LHC on monday    Savannah Smith, APRN-CNP

## 2024-04-27 NOTE — H&P
Patient: Aamir Grossman   Age: 68 y.o.   Gender: male   Room/Bed: 120/120-A     Attending: Cassius Bryan MD  Code Status:  Full Code    Chief Complaint:  Patient: Aamir Grossman is a 68 y.o. male who presented to the hospital for chest pain.     History of Presenting Illness  Aamir Grossman is a 68 y.o. male with Pmhx of CAD/ACS sp 3 stents, HTN, HLD, hypothyroidism, Psoriasis who presented to the hospital for chest pain. Patient presented to [place] ED for sharp stabbing chest pain starting at 9:30 AM this morning lasting for about an hour with radiation to the back as well as the neck and left shoulder.  The pain started at rest about an hour after taking ibuprofen for tooth pain and he admitted to associated nausea and vomiting. pain was relieved with nitroglycerin in the ED. he denied any associated shortness of breath, lightheadedness, dizziness, palpitations, abdominal pain. he denied any history of exertional dyspnea, chest pain, presyncope, syncope.  He currently is asymptomatic except for tooth pain. Of note he was admitted last June for similar complaints after taking a lot of ibuprofen for tooth pain. he denies any lower extremity edema, orthopnea, PND.  He follows with Dr. Calderón for cardiology.  He denies abdominal pain, changes in bowel movements, no melena or hematochezia. he is a former smoker, denies any alcohol intake or illicit drug use.  He states he is compliant with all of his medications except for his topical clobetasol for his psoriasis because of cost due to insurance reasoning.     ED course:   HDS   Eleavted Scr  Elevated DDIMER>CTPE without PE   EKG wthout dysnmaic changes, trops 15>27>616  Interventions: heparin gtt, ASA load, NG. Cardio consulted and transferred.       Past Medical History   Past Medical History:   Diagnosis Date    Coronary artery disease     Encounter for immunization 09/20/2016    Influenza vaccination administered at current visit    Encounter for  screening, unspecified 09/30/2015    Screening    GERD (gastroesophageal reflux disease)     Hypertension     Hypothyroidism     Myocardial infarction (Multi)     Other specified anxiety disorders     Depression with anxiety    Personal history of diseases of the skin and subcutaneous tissue     History of psoriasis    Personal history of nicotine dependence 10/20/2014    Former smoker, stopped smoking in distant past    Personal history of other diseases of the circulatory system 06/10/2016    History of hypotension    Personal history of other diseases of the circulatory system 01/11/2017    History of hypertension    Personal history of other diseases of the circulatory system     Personal history of congestive heart failure    Personal history of other diseases of the circulatory system     Personal history of coronary atherosclerosis    Personal history of other diseases of the respiratory system 12/30/2015    History of paranasal sinus congestion    Personal history of other endocrine, nutritional and metabolic disease     History of hypothyroidism    Pure hypercholesterolemia, unspecified 10/25/2022    Hypercholesterolemia    Tendinopathy of left rotator cuff 06/13/2023      Past Surgical History:   Past Surgical History:   Procedure Laterality Date    CARDIAC SURGERY      CORONARY STENT PLACEMENT      OTHER SURGICAL HISTORY  01/21/2014    Previous Stent Placement    OTHER SURGICAL HISTORY  01/21/2014    Ear Surgery    ROTATOR CUFF REPAIR Left 05/29/2018    Rotator Cuff Repair    TONSILLECTOMY  05/29/2018    Tonsillectomy     Family History:   Family History   Problem Relation Name Age of Onset    Hypertension Mother      Hyperlipidemia Mother      Osteoporosis Mother      Coronary artery disease Father       Social History:   Tobacco Use: Medium Risk (4/26/2024)    Patient History     Smoking Tobacco Use: Former     Smokeless Tobacco Use: Never     Passive Exposure: Not on file      Social History  "    Substance and Sexual Activity   Alcohol Use Yes    Comment: 1 x week       Outpatient Medications:  Current Outpatient Medications   Medication Instructions    aspirin 81 mg, oral, Daily    citalopram (CELEXA) 20 mg, oral, Daily    clobetasol (Temovate) 0.05 % ointment     clopidogrel (PLAVIX) 75 mg, oral, Daily    levothyroxine (SYNTHROID, LEVOXYL) 100 mcg, oral, Daily    lisinopril 2.5 mg, oral, Daily    metoprolol succinate XL (TOPROL-XL) 25 mg, oral, Daily    omeprazole (PRILOSEC) 40 mg, oral, Daily before breakfast, Do not crush or chew.    rosuvastatin (CRESTOR) 20 mg, oral, Daily    Taltz Syringe 80 mg, subcutaneous        ED Course   Vitals - BP (!) 169/97 (Patient Position: Lying)   Pulse 57   Temp 36.6 °C (97.9 °F) (Temporal)   Resp 18   Wt 74.3 kg (163 lb 12.8 oz)   SpO2 98%     Labs:   CBC:  Recent Labs     04/26/24  1043 06/16/23  0623 06/15/23  1157   WBC 8.3 5.1 8.0   HGB 14.3 13.0* 13.4*   HCT 40.6* 38.0* 39.2*   * 105* 119*   * 105* 106*     CMP:  Recent Labs     04/26/24  1043 06/16/23  0623 06/15/23  1157    137 135*   K 4.5 4.4 5.0    105 102   CO2 21 25 22   ANIONGAP 19 11 16   BUN 20 19 24*   CREATININE 1.33* 1.00 1.36*   EGFR 58*  --   --    GLUCOSE 96 84 90     Recent Labs     04/26/24  1043 06/15/23  1157 10/25/22  1001   ALBUMIN 4.5 4.7 4.5   ALKPHOS 93 76 88   ALT 33 23 40   AST 33 29 38   BILITOT 0.9 0.7 0.9     Calcium/Phos:   Lab Results   Component Value Date    CALCIUM 9.9 04/26/2024      COAG:   Recent Labs     04/26/24  1121 03/01/18  1518   INR  --  1.0   DDIMERVTE 549*  --      CRP: No results found for: \"CRP\"   [unfilled]   ENDO:  Recent Labs     10/25/22  1001 09/10/21  1036 08/18/20  1210 07/02/19  1229   TSH 3.06 2.70 3.47 2.84      CARDIAC:   Recent Labs     04/26/24  1301 04/26/24  1135 04/26/24  1043   TROPHS 616* 27* 15   BNP  --   --  31     Recent Labs     06/16/23  0623 10/25/22  1001 09/10/21  1036   CHOL 164 175 179   LDLF 55 88 " "93   HDL 66.9 71.0 50.0   TRIG 211* 82 179*     No data recorded    Micro/ID:   No results found for the last 90 days.                   No lab exists for component: \"AGALPCRNB\"   .ID  No results found for: \"URINECULTURE\", \"BLOODCULT\", \"CSFCULTSMEAR\"    Imaging:   No orders to display     Encounter Date: 04/26/24   ECG 12 lead   Result Value    Ventricular Rate 54    Atrial Rate 54    TN Interval 188    QRS Duration 90    QT Interval 440    QTC Calculation(Bazett) 417    P Axis 47    R Axis -35    T Axis 33    QRS Count 9    Q Onset 223    P Onset 129    P Offset 185    T Offset 443    QTC Fredericia 424    Narrative    Sinus bradycardia  Left axis deviation  Abnormal ECG  When compared with ECG of 15-JONATHAN-2023 12:54,  No significant change was found  See ED provider note for full interpretation and clinical correlation  Confirmed by Staci Roach ([id number]) on 4/26/2024 12:54:10 PM     No echocardiogram results found for the past 12 months  CT angio chest for pulmonary embolism    Result Date: 4/26/2024  STUDY: CT Angiogram of the Chest; 4/26/2024 1:42 PM INDICATION: Shortness of breath with sharp chest pain. COMPARISON: XR CHEST same day 11:04 AM. ACCESSION NUMBER(S): [id number] ORDERING CLINICIAN: NED GARZA TECHNIQUE:  CTA of the chest was performed with intravenous contrast. Images are reviewed and processed at a workstation according to the CT angiogram protocol with 3-D and/or MIP post processing imaging generated.  Omnipaque 350 75 mL was administered intravenously. Automated mA/kV exposure control was utilized and patient examination was performed in strict accordance with principles of ALARA. FINDINGS: Pulmonary arteries are adequately opacified without acute or chronic filling defects.  The thoracic aorta is normal in course and without dissection or aneurysm.  The ascending aorta measures about 3.9 cm in diameter.  The proximal descending aorta measures about 3.3 cm in diameter and the mid " descending aorta measures around 3.0 cm in diameter.  There are scattered vascular calcifications along the arterial tree. The heart is normal in size without pericardial effusion.  Coronary artery calcifications are noted.  Thoracic lymph nodes are not enlarged. There is no pleural effusion, pleural thickening, or pneumothorax. The airways are patent. Lungs are clear without consolidation, interstitial disease, or suspicious nodules.  There is bilateral dependent atelectasis. Upper abdomen demonstrates no acute pathology. There are no acute fractures.  No suspicious bony lesions.  There are subacute fractures of the left ninth and 10th ribs posteriorly and possible subacute or chronic chronic fracture of the left 11th rib posteriorly.    1.No evidence of acute pulmonary embolus or thoracic aortic aneurysm or dissection. 2.Normal heart size with coronary artery calcifications. 3.Dependent atelectasis in both lungs. 4.Subacute fractures of the left ninth and 10th ribs posteriorly and subacute or chronic fracture of the left 11th rib posteriorly. Signed by Emilee Zavaleta DO    ECG 12 lead    Result Date: 4/26/2024  Sinus bradycardia Otherwise normal ECG When compared with ECG of 26-APR-2024 10:30, (unconfirmed) No significant change was found See ED provider note for full interpretation and clinical correlation Confirmed by Staci Roach ([id number]) on 4/26/2024 12:54:13 PM    ECG 12 lead    Result Date: 4/26/2024  Sinus bradycardia Left axis deviation Abnormal ECG When compared with ECG of 15-JONATHAN-2023 12:54, No significant change was found See ED provider note for full interpretation and clinical correlation Confirmed by Staci Roach ([id number]) on 4/26/2024 12:54:10 PM    XR chest 1 view    Result Date: 4/26/2024  STUDY: Chest Radiograph;  04/26/2024, 10:55AM INDICATION: Chest pain. COMPARISON: 06/15/2023 XR chest ACCESSION NUMBER(S): [id number] ORDERING CLINICIAN: NED GARZA TECHNIQUE:  Frontal chest was  obtained at 10:55 hours.  The frontal view was repeated by the technologist. FINDINGS: CARDIOMEDIASTINAL SILHOUETTE: Cardiomediastinal silhouette is normal in size and configuration.  LUNGS: Lungs are clear.  ABDOMEN: No remarkable upper abdominal findings.  BONES: No acute osseous changes.    No consolidation or effusion. Signed by Stefan Magaña MD     Interventions:  Medications   heparin 25,000 Units in dextrose 5% 250 mL (100 Units/mL) infusion (premix) (930 Units/hr intravenous New Bag 4/26/24 2038)   heparin bolus from bag 2,000-4,000 Units (has no administration in time range)   aspirin chewable tablet 81 mg (has no administration in time range)   clopidogrel (Plavix) tablet 300 mg (has no administration in time range)     And   clopidogrel (Plavix) tablet 75 mg (has no administration in time range)   atorvastatin (Lipitor) tablet 80 mg (has no administration in time range)   nitroglycerin (Nitrostat) SL tablet 0.4 mg (has no administration in time range)   oxyCODONE (Roxicodone) immediate release tablet 5 mg (has no administration in time range)   oxyCODONE (Roxicodone) immediate release tablet 10 mg (has no administration in time range)   amoxicillin-pot clavulanate (Augmentin) 875-125 mg per tablet 1 tablet (has no administration in time range)   pantoprazole (ProtoNix) injection 40 mg (has no administration in time range)       Objective Data  Physical Exam  Constitutional:       General: He is not in acute distress.     Appearance: Normal appearance.   HENT:      Head: Normocephalic and atraumatic.      Right Ear: Tympanic membrane, ear canal and external ear normal.      Left Ear: Tympanic membrane, ear canal and external ear normal.      Nose: Nose normal.      Mouth/Throat:      Mouth: Mucous membranes are moist.      Pharynx: Oropharynx is clear.      Comments: Poor dental hygiene, multiple dental caries, left mandible swelling and tenderness on palpation  Eyes:      General: No scleral icterus.     " Extraocular Movements: Extraocular movements intact.      Conjunctiva/sclera: Conjunctivae normal.      Pupils: Pupils are equal, round, and reactive to light.   Cardiovascular:      Rate and Rhythm: Normal rate and regular rhythm.      Pulses: Normal pulses.      Heart sounds: Normal heart sounds. No murmur heard.     No gallop.   Pulmonary:      Effort: Pulmonary effort is normal.      Breath sounds: Normal breath sounds.   Abdominal:      General: Abdomen is flat. Bowel sounds are normal.      Palpations: Abdomen is soft.      Tenderness: There is no abdominal tenderness. There is no guarding or rebound.   Musculoskeletal:         General: Normal range of motion.      Cervical back: Normal range of motion and neck supple.      Right lower leg: No edema.      Left lower leg: No edema.   Skin:     General: Skin is warm and dry.      Capillary Refill: Capillary refill takes less than 2 seconds.      Coloration: Skin is not jaundiced or pale.      Findings: No bruising, erythema, lesion or rash.      Comments: Large erythematous scaling plaques on anterior legs and elbows   Neurological:      General: No focal deficit present.      Mental Status: He is alert and oriented to person, place, and time. Mental status is at baseline.      Cranial Nerves: No cranial nerve deficit.      Sensory: No sensory deficit.      Motor: No weakness.      Coordination: Coordination normal.      Deep Tendon Reflexes: Reflexes normal.   Psychiatric:         Mood and Affect: Mood normal.         Behavior: Behavior normal.          Visit Vitals  BP (!) 169/97 (Patient Position: Lying)   Pulse 57   Temp 36.6 °C (97.9 °F) (Temporal)   Resp 18   Ht 1.753 m (5' 9\")   Wt 74.3 kg (163 lb 12.8 oz)   SpO2 98%   BMI 24.19 kg/m²   Smoking Status Former   BSA 1.9 m²        No intake or output data in the 24 hours ending 04/26/24 2051          Assessment and Plan   Aamir Grossman is a 68 y.o. male with Pmhx of CAD/ACS sp 3 stents, HTN, HLD, " hypothyroidism, Psoriasis who is transferred for chest pain, cf ACS      Acute Medical Issues:    #atypical chest pain, elevated trops   #cf NSTEMI, hx of CAD/ACS with 3 stents, HTN, HLD    #possible GERD/dyspepsia from ibuprofen use   -HDS, EKG wthout dysnmaic changes, trops 15>27>616. Will continue to trend. NATALIE score 5  -TTE 6/23: EF 60%, apical septal segment and anterior septal segment abnormal, diastolic dysfunction  -ASA loaded, heparin drip started in ED.   -Followed with Plavix.  Continue DAPT maintenance dosing and heparin drip.  Started on high intensity statin and will continue home beta-blocker  -Will start PPI  -Nitroglycerin PRN  -Cardiology consulted, appreciate recs    #ALEXA, likely prerenal from ibupforen use.   -b/l 1, Scr 1.3.   -IVF PRN, did receive contrast in ED  -holding home ACEi    #dental caries w infection/?abscess   -HDS, aseptic   -Augmentin until follow up with dentist appt on Tuesday  -oxy for dental pain     Chronic Medical Issues:     #hypothyroidism: synthroid   #psoriasis: clobetasol, holding humira    #TERRELL: lexapro     Fluids: PRN   Electrolytes: PRN  Nutrition: cardiac   GI Prophylaxis: ppi  DVT Prophylaxis: heparin gtt  CODE: FULL    Dispo: Admitted for chest pain and elevated trops concerning for NSTEMI.  On NSTEMI protocol and cardiology consulted. eLOS>48hrs.      Kenneth Adler DO

## 2024-04-27 NOTE — PROGRESS NOTES
04/27/24 0919   Discharge Planning   Living Arrangements Spouse/significant other   Support Systems Spouse/significant other   Assistance Needed alert x3, independent with no DME and drives   Type of Residence Private residence   Do you have animals or pets at home? No   Home or Post Acute Services None   Patient expects to be discharged to: home no needs   Does the patient need discharge transport arranged? No   Financial Resource Strain   How hard is it for you to pay for the very basics like food, housing, medical care, and heating? Not hard   Housing Stability   In the last 12 months, was there a time when you were not able to pay the mortgage or rent on time? N   In the last 12 months, how many places have you lived? 1   In the last 12 months, was there a time when you did not have a steady place to sleep or slept in a shelter (including now)? N   Transportation Needs   In the past 12 months, has lack of transportation kept you from medical appointments or from getting medications? no   In the past 12 months, has lack of transportation kept you from meetings, work, or from getting things needed for daily living? No   Patient Choice   Provider Choice list and CMS website (https://medicare.gov/care-compare#search) for post-acute Quality and Resource Measure Data were provided and reviewed with: Other (Comment)  (not applicable)

## 2024-04-27 NOTE — PROGRESS NOTES
Aamir Grossman is a 68 y.o. male on day 1 of admission presenting with NSTEMI (non-ST elevated myocardial infarction) (Multi).      Subjective   Worsening pain and swelling around L jaw. Also complains of pain in a nearby L lower molar       Objective     Last Recorded Vitals  /77 (BP Location: Left arm, Patient Position: Lying)   Pulse 64   Temp 37.2 °C (99 °F) (Temporal)   Resp 16   Wt 74.3 kg (163 lb 12.8 oz)   SpO2 95%   Intake/Output last 3 Shifts:    Intake/Output Summary (Last 24 hours) at 4/27/2024 1447  Last data filed at 4/27/2024 0909  Gross per 24 hour   Intake 681.45 ml   Output --   Net 681.45 ml       Admission Weight  Weight: 74.3 kg (163 lb 12.8 oz) (04/26/24 2032)    Daily Weight  04/26/24 : 74.3 kg (163 lb 12.8 oz)    Image Results  CT angio chest for pulmonary embolism  Narrative: STUDY:  CT Angiogram of the Chest; 4/26/2024 1:42 PM  INDICATION:  Shortness of breath with sharp chest pain.  COMPARISON:  XR CHEST same day 11:04 AM.  ACCESSION NUMBER(S):  [id number]  ORDERING CLINICIAN:  NED GARZA  TECHNIQUE:  CTA of the chest was performed with intravenous contrast.   Images are reviewed and processed at a workstation according to the CT  angiogram protocol with 3-D and/or MIP post processing imaging  generated.  Omnipaque 350 75 mL was administered intravenously.  Automated mA/kV exposure control was utilized and patient examination  was performed in strict accordance with principles of ALARA.  FINDINGS:  Pulmonary arteries are adequately opacified without acute or chronic  filling defects.  The thoracic aorta is normal in course and without  dissection or aneurysm.  The ascending aorta measures about 3.9 cm in  diameter.  The proximal descending aorta measures about 3.3 cm in  diameter and the mid descending aorta measures around 3.0 cm in  diameter.  There are scattered vascular calcifications along the  arterial tree.  The heart is normal in size without pericardial  effusion.  Coronary  artery calcifications are noted.  Thoracic lymph nodes are not  enlarged.  There is no pleural effusion, pleural thickening, or pneumothorax.   The airways are patent.  Lungs are clear without consolidation, interstitial disease, or  suspicious nodules.  There is bilateral dependent atelectasis.  Upper abdomen demonstrates no acute pathology.  There are no acute fractures.  No suspicious bony lesions.  There are  subacute fractures of the left ninth and 10th ribs posteriorly and  possible subacute or chronic chronic fracture of the left 11th rib  posteriorly.  Impression: 1.No evidence of acute pulmonary embolus or thoracic aortic  aneurysm or dissection.  2.Normal heart size with coronary artery calcifications.  3.Dependent atelectasis in both lungs.  4.Subacute fractures of the left ninth and 10th ribs posteriorly  and subacute or chronic fracture of the left 11th rib posteriorly.  Signed by Emilee Zavaleta DO  ECG 12 lead  Sinus bradycardia  Otherwise normal ECG  When compared with ECG of 26-APR-2024 10:30, (unconfirmed)  No significant change was found  See ED provider note for full interpretation and clinical correlation  Confirmed by Staci Roach ([id number]) on 4/26/2024 12:54:13 PM  ECG 12 lead  Sinus bradycardia  Left axis deviation  Abnormal ECG  When compared with ECG of 15-JONATHAN-2023 12:54,  No significant change was found  See ED provider note for full interpretation and clinical correlation  Confirmed by Staci Roach ([id number]) on 4/26/2024 12:54:10 PM  XR chest 1 view  Narrative: STUDY:  Chest Radiograph;  04/26/2024, 10:55AM  INDICATION:  Chest pain.  COMPARISON:  06/15/2023 XR chest  ACCESSION NUMBER(S):  [id number]  ORDERING CLINICIAN:  NED GARZA  TECHNIQUE:  Frontal chest was obtained at 10:55 hours.  The frontal  view was repeated by the technologist.  FINDINGS:  CARDIOMEDIASTINAL SILHOUETTE:  Cardiomediastinal silhouette is normal in size and configuration.      LUNGS:  Lungs are clear.     ABDOMEN:  No remarkable upper abdominal findings.     BONES:  No acute osseous changes.  Impression: No consolidation or effusion.  Signed by Stefan Magaña MD      Physical Exam  Gen: lying comfortably in bed, not in acute distress  HEENT: swollen mobile area to L lower jaw, c/w swollen lymph node  Pulm: normal respiratory effort, clear to auscultation b/l  Cardiac: RRR, no murmurs noted, normal S1/S2  GI: Soft, nontender, BS+  MSK: normal ROM without joint swelling  Extremities: no LE edema, cyanosis  Neuro: AOX3, CN II-XII grossly intact, equal b/l strength, no loss in sensation   Psych: calm and appropriate for situation    Relevant Results  Scheduled medications  amoxicillin-pot clavulanate, 1 tablet, oral, q12h TRUE  aspirin, 81 mg, oral, Daily  atorvastatin, 80 mg, oral, Nightly  citalopram, 20 mg, oral, Daily  clobetasol, , Topical, BID  clopidogrel, 75 mg, oral, Daily  levothyroxine, 100 mcg, oral, Daily  [START ON 4/28/2024] lisinopril, 5 mg, oral, Daily  metoprolol succinate XL, 25 mg, oral, Daily  pantoprazole, 40 mg, intravenous, Daily      Continuous medications  heparin, 0-4,000 Units/hr, Last Rate: 930 Units/hr (04/26/24 9409)      PRN medications  PRN medications: acetaminophen **OR** acetaminophen **OR** acetaminophen, heparin, nitroglycerin, oxyCODONE, oxyCODONE    Results for orders placed or performed during the hospital encounter of 04/26/24 (from the past 24 hour(s))   Troponin I, High Sensitivity   Result Value Ref Range    Troponin I, High Sensitivity 5,466 (HH) 0 - 20 ng/L   Heparin Assay, UFH   Result Value Ref Range    Heparin Unfractionated 0.3 See Comment Below for Therapeutic Ranges IU/mL   Protime-INR   Result Value Ref Range    Protime 11.1 9.8 - 12.8 seconds    INR 1.0 0.9 - 1.1   Heparin Assay, UFH   Result Value Ref Range    Heparin Unfractionated 0.3 See Comment Below for Therapeutic Ranges IU/mL   Troponin I, High Sensitivity   Result Value Ref  Range    Troponin I, High Sensitivity 3,785 (HH) 0 - 20 ng/L   CBC   Result Value Ref Range    WBC 6.8 4.4 - 11.3 x10*3/uL    nRBC 0.0 0.0 - 0.0 /100 WBCs    RBC 3.56 (L) 4.50 - 5.90 x10*6/uL    Hemoglobin 12.9 (L) 13.5 - 17.5 g/dL    Hematocrit 37.3 (L) 41.0 - 52.0 %     (H) 80 - 100 fL    MCH 36.2 (H) 26.0 - 34.0 pg    MCHC 34.6 32.0 - 36.0 g/dL    RDW 10.8 (L) 11.5 - 14.5 %    Platelets 86 (L) 150 - 450 x10*3/uL   Magnesium   Result Value Ref Range    Magnesium 1.80 1.60 - 2.40 mg/dL   Renal Function Panel   Result Value Ref Range    Glucose 76 74 - 99 mg/dL    Sodium 132 (L) 136 - 145 mmol/L    Potassium 4.1 3.5 - 5.3 mmol/L    Chloride 99 98 - 107 mmol/L    Bicarbonate 23 21 - 32 mmol/L    Anion Gap 14 10 - 20 mmol/L    Urea Nitrogen 16 6 - 23 mg/dL    Creatinine 0.76 0.50 - 1.30 mg/dL    eGFR >90 >60 mL/min/1.73m*2    Calcium 9.0 8.6 - 10.3 mg/dL    Phosphorus 3.4 2.5 - 4.9 mg/dL    Albumin 4.0 3.4 - 5.0 g/dL   Troponin I, High Sensitivity   Result Value Ref Range    Troponin I, High Sensitivity 2,573 (HH) 0 - 20 ng/L   Heparin Assay   Result Value Ref Range    Heparin Unfractionated 0.4 See Comment Below for Therapeutic Ranges IU/mL       CT angio chest for pulmonary embolism    Result Date: 4/26/2024  STUDY: CT Angiogram of the Chest; 4/26/2024 1:42 PM INDICATION: Shortness of breath with sharp chest pain. COMPARISON: XR CHEST same day 11:04 AM. ACCESSION NUMBER(S): [id number] ORDERING CLINICIAN: NED GARZA TECHNIQUE:  CTA of the chest was performed with intravenous contrast. Images are reviewed and processed at a workstation according to the CT angiogram protocol with 3-D and/or MIP post processing imaging generated.  Omnipaque 350 75 mL was administered intravenously. Automated mA/kV exposure control was utilized and patient examination was performed in strict accordance with principles of ALARA. FINDINGS: Pulmonary arteries are adequately opacified without acute or chronic filling defects.   The thoracic aorta is normal in course and without dissection or aneurysm.  The ascending aorta measures about 3.9 cm in diameter.  The proximal descending aorta measures about 3.3 cm in diameter and the mid descending aorta measures around 3.0 cm in diameter.  There are scattered vascular calcifications along the arterial tree. The heart is normal in size without pericardial effusion.  Coronary artery calcifications are noted.  Thoracic lymph nodes are not enlarged. There is no pleural effusion, pleural thickening, or pneumothorax. The airways are patent. Lungs are clear without consolidation, interstitial disease, or suspicious nodules.  There is bilateral dependent atelectasis. Upper abdomen demonstrates no acute pathology. There are no acute fractures.  No suspicious bony lesions.  There are subacute fractures of the left ninth and 10th ribs posteriorly and possible subacute or chronic chronic fracture of the left 11th rib posteriorly.    1.No evidence of acute pulmonary embolus or thoracic aortic aneurysm or dissection. 2.Normal heart size with coronary artery calcifications. 3.Dependent atelectasis in both lungs. 4.Subacute fractures of the left ninth and 10th ribs posteriorly and subacute or chronic fracture of the left 11th rib posteriorly. Signed by Emilee Zavaleta DO    ECG 12 lead    Result Date: 4/26/2024  Sinus bradycardia Otherwise normal ECG When compared with ECG of 26-APR-2024 10:30, (unconfirmed) No significant change was found See ED provider note for full interpretation and clinical correlation Confirmed by Staci Roach ([id number]) on 4/26/2024 12:54:13 PM    ECG 12 lead    Result Date: 4/26/2024  Sinus bradycardia Left axis deviation Abnormal ECG When compared with ECG of 15-JONATHAN-2023 12:54, No significant change was found See ED provider note for full interpretation and clinical correlation Confirmed by Staci Roach ([id number]) on 4/26/2024 12:54:10 PM    XR chest 1 view    Result Date:  4/26/2024  STUDY: Chest Radiograph;  04/26/2024, 10:55AM INDICATION: Chest pain. COMPARISON: 06/15/2023 XR chest ACCESSION NUMBER(S): [id number] ORDERING CLINICIAN: NED GARZA TECHNIQUE:  Frontal chest was obtained at 10:55 hours.  The frontal view was repeated by the technologist. FINDINGS: CARDIOMEDIASTINAL SILHOUETTE: Cardiomediastinal silhouette is normal in size and configuration.  LUNGS: Lungs are clear.  ABDOMEN: No remarkable upper abdominal findings.  BONES: No acute osseous changes.    No consolidation or effusion. Signed by Stefan Magaña MD         Assessment/Plan      Principal Problem:    NSTEMI (non-ST elevated myocardial infarction) (Multi)    Aamir Grossman is a 68 y.o. male with Pmhx of CAD/ACS sp 3 stents, HTN, HLD, hypothyroidism, Psoriasis who is transferred for chest pain, cf ACS         #atypical chest pain, elevated trops   #cf NSTEMI, hx of CAD/ACS with 3 stents, HTN, HLD    #possible GERD/dyspepsia from ibuprofen use   -ASA loaded, heparin drip started in ED.   -Followed with Plavix.  Continue DAPT maintenance dosing and heparin drip.  Started on high intensity statin and will continue home beta-blocker  -Will start PPI  -Nitroglycerin PRN  -Cardiology consulted, appreciate recs      #dental caries w infection/?abscess   -HDS, aseptic   -Augmentin until follow up with dentist appt on Tuesday  -oxy for dental pain   -worsening swelling along his jaw, could be swollen lymph node although it is very large; will check US to r/o developing abscess    #ALEXA, likely prerenal from ibupforen use.   -b/l 1, Scr 1.3.   -IVF PRN, did receive contrast in ED  -holding home ACEi     #hypothyroidism: synthroid   #psoriasis: clobetasol, holding humira    #TERRELL: lexapro      Fluids: PRN   Electrolytes: PRN  Nutrition: cardiac   GI Prophylaxis: ppi  DVT Prophylaxis: heparin gtt  CODE: FULL     Dispo: Admitted for chest pain and elevated trops concerning for NSTEMI.  On NSTEMI protocol and cardiology  consulted. eLOS>48hrs.           Oscar Kimball, DO

## 2024-04-27 NOTE — CONSULTS
Inpatient consult to cardiology  Consult performed by: Savannah Smith, BILL-CNP  Consult ordered by: Faheem Mathew MD  Reason for consult: NSTEMI          Reason For Consult  nstemi    History Of Present Illness  Aamir Grossman is a 68 y.o. male presenting with chest pain, he reports sudden onset around 930 am described as sharp stabbing pain, mid sternal that radiated to back neck and left shoulder. He reports the pain lasted 6-8 hours and was constant until he took nitroglycerin and hasn't had the pain since.   He is also complaining of tooth pain and swelling. Upon arrival to the ER a 12 lead EKG was performed and showed sinus bradycardia with no ST elevation or depression. Troponins were elevated 27->616->5466->3785->2573. He was given aspirin, plavix, atorvastatin, metoprolol and started on a heparin gtt per ACS protocol. He was admitted for further management.       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 His smoking use included cigarettes and cigars. He started smoking about 44 years ago. He has a 20 pack-year smoking history. He has never used smokeless tobacco. He reports current alcohol use. He reports that he does not use drugs.    Family History  Family History   Problem Relation Name Age of Onset    Hypertension Mother      Hyperlipidemia Mother      Osteoporosis Mother      Coronary artery disease Father          Allergies  Penicillin    Review of Systems  Review of Systems   Constitutional:  Negative for activity change, appetite change, chills, fatigue and  fever.   HENT:  Positive for dental problem. Negative for congestion.    Respiratory:  Negative for apnea, cough, chest tightness, shortness of breath, wheezing and stridor.    Cardiovascular:  Positive for chest pain. Negative for palpitations and leg swelling.   Gastrointestinal:  Negative for abdominal distention, abdominal pain, blood in stool, diarrhea, nausea and vomiting.   Endocrine: Negative for cold intolerance, heat intolerance, polydipsia, polyphagia and polyuria.   Genitourinary:  Negative for frequency and urgency.   Musculoskeletal:  Negative for arthralgias, back pain, gait problem, joint swelling, myalgias, neck pain and neck stiffness.   Skin:  Negative for color change and pallor.   Neurological:  Negative for dizziness and light-headedness.   Psychiatric/Behavioral:  Negative for behavioral problems.          Physical Exam  Physical Exam  Constitutional:       Appearance: Normal appearance.   HENT:      Head: Normocephalic and atraumatic.      Nose: Nose normal. No congestion.   Eyes:      Extraocular Movements: Extraocular movements intact.      Pupils: Pupils are equal, round, and reactive to light.   Cardiovascular:      Rate and Rhythm: Normal rate and regular rhythm.      Pulses: Normal pulses.      Heart sounds: Normal heart sounds. No murmur heard.     No friction rub. No gallop.   Pulmonary:      Effort: Pulmonary effort is normal. No tachypnea, bradypnea, accessory muscle usage or respiratory distress.      Breath sounds: Normal breath sounds. No stridor. No wheezing, rhonchi or rales.   Chest:      Chest wall: No tenderness.   Abdominal:      General: Bowel sounds are normal. There is no distension.      Palpations: Abdomen is soft.      Tenderness: There is no abdominal tenderness.   Musculoskeletal:         General: Normal range of motion.      Cervical back: Normal range of motion and neck supple.   Skin:     General: Skin is warm and dry.      Capillary Refill: Capillary refill  takes less than 2 seconds.   Neurological:      General: No focal deficit present.      Mental Status: He is alert and oriented to person, place, and time. Mental status is at baseline.   Psychiatric:         Mood and Affect: Mood normal.         Behavior: Behavior normal.             Last Recorded Vitals  /88 (BP Location: Left arm, Patient Position: Lying)   Pulse 66   Temp 36.9 °C (98.4 °F) (Temporal)   Resp 18   Wt 74.3 kg (163 lb 12.8 oz)   SpO2 97%     Relevant Results  Results for orders placed or performed during the hospital encounter of 04/26/24 (from the past 24 hour(s))   Troponin I, High Sensitivity   Result Value Ref Range    Troponin I, High Sensitivity 5,466 (HH) 0 - 20 ng/L   Heparin Assay, UFH   Result Value Ref Range    Heparin Unfractionated 0.3 See Comment Below for Therapeutic Ranges IU/mL   Protime-INR   Result Value Ref Range    Protime 11.1 9.8 - 12.8 seconds    INR 1.0 0.9 - 1.1   Heparin Assay, UFH   Result Value Ref Range    Heparin Unfractionated 0.3 See Comment Below for Therapeutic Ranges IU/mL   Troponin I, High Sensitivity   Result Value Ref Range    Troponin I, High Sensitivity 3,785 (HH) 0 - 20 ng/L   CBC   Result Value Ref Range    WBC 6.8 4.4 - 11.3 x10*3/uL    nRBC 0.0 0.0 - 0.0 /100 WBCs    RBC 3.56 (L) 4.50 - 5.90 x10*6/uL    Hemoglobin 12.9 (L) 13.5 - 17.5 g/dL    Hematocrit 37.3 (L) 41.0 - 52.0 %     (H) 80 - 100 fL    MCH 36.2 (H) 26.0 - 34.0 pg    MCHC 34.6 32.0 - 36.0 g/dL    RDW 10.8 (L) 11.5 - 14.5 %    Platelets 86 (L) 150 - 450 x10*3/uL   Magnesium   Result Value Ref Range    Magnesium 1.80 1.60 - 2.40 mg/dL   Renal Function Panel   Result Value Ref Range    Glucose 76 74 - 99 mg/dL    Sodium 132 (L) 136 - 145 mmol/L    Potassium 4.1 3.5 - 5.3 mmol/L    Chloride 99 98 - 107 mmol/L    Bicarbonate 23 21 - 32 mmol/L    Anion Gap 14 10 - 20 mmol/L    Urea Nitrogen 16 6 - 23 mg/dL    Creatinine 0.76 0.50 - 1.30 mg/dL    eGFR >90 >60 mL/min/1.73m*2     Calcium 9.0 8.6 - 10.3 mg/dL    Phosphorus 3.4 2.5 - 4.9 mg/dL    Albumin 4.0 3.4 - 5.0 g/dL   Troponin I, High Sensitivity   Result Value Ref Range    Troponin I, High Sensitivity 2,573 (HH) 0 - 20 ng/L   Heparin Assay   Result Value Ref Range    Heparin Unfractionated 0.4 See Comment Below for Therapeutic Ranges IU/mL        === 04/26/24 ===    XR CHEST 1 VIEW    - Impression -  No consolidation or effusion.  Signed by Stefan Magaña MD     No echocardiogram results found for the past 12 months     Patient Active Problem List   Diagnosis    Abnormal platelets (Multi)    Anemia    Arthritis of left temporomandibular joint    Benign hypertension    CAD (coronary artery disease)    Depression, major, in remission (CMS-[facility])    Erectile dysfunction    Acquired hypothyroidism    Psoriatic arthropathy (Multi)    Vasovagal episode    Vitamin D deficiency    Screening for colon cancer    Tendinopathy of left rotator cuff    Other forms of angina pectoris (CMS-[facility])    Gastroesophageal reflux disease without esophagitis    Cervical dysphagia    Cricopharyngeal dysphagia    Hypertensive heart disease with heart failure (Multi)    NSTEMI (non-ST elevated myocardial infarction) (Multi)          Assessment/Plan      6/16/23 Echocardiogram  CONCLUSIONS:  1. Left ventricular systolic function is normal with a 60% estimated ejection fraction.  2. Apical septal segment and mid anteroseptal segment are abnormal.  3. Spectral Doppler shows an impaired relaxation pattern of left ventricular diastolic filling.  4. There is ischemic cardiomyopathy.    4/14/2022 Stress Test: severely reduced uptake in the distal anterior wall and apex, moderately reduced uptake in the mid to distal septum. Scar in the distal anterior wall, apex, distal septum, no ischemia    NSTEMI/ Elevated Troponin/ Coronary artery disease  - s/p anterior myocardial infarction  - Hx of CAD with stent placement to LAD x 3 in 2013  - Troponin elevated  27->616->5466->3785->2573  - I reviewed the EKG, no acute changes, ST elevation or depression  -CT chest angio negative for PE  - Echocardiogram reviewed as above  - Plan for LHC on Monday  - Continue heparin gtt  - continue aspirin, plavix, metoprolol, and atorvastatin    2. Chronic diastolic and systolic CHF, ICMO  -CXR negative  -I reviewed the Echocardiogram from 6/23, EF 60%, ICMO, apical septal segment and mid anteroseptal segment ar abnormal  -Strict I & Os  -Daily weights  -2gm na diet  -Cont metoprolol  - Lisinopril held for creatinine elevation    3. Hypertension  -stable  -2gm na diet  -lisinopril held for elevated creatinine, now creatinine normal. Recommend resuming lisinopril for hypertension  -monitor     4. Hyperlipidemia  -Cont statin     5. Acute kidney injury  -Increased most likely from increased NSAID use    6. Tooth Infection  - creatinine most likely increased from NSAID use  - Continue antibiotic  - Ok to continue with LHC on monday    Savannah Smith, APRN-CNP

## 2024-04-28 LAB
HOLD SPECIMEN: NORMAL
HOLD SPECIMEN: NORMAL
UFH PPP CHRO-ACNC: 0.3 IU/ML

## 2024-04-28 PROCEDURE — 36415 COLL VENOUS BLD VENIPUNCTURE: CPT | Performed by: INTERNAL MEDICINE

## 2024-04-28 PROCEDURE — 2500000001 HC RX 250 WO HCPCS SELF ADMINISTERED DRUGS (ALT 637 FOR MEDICARE OP)

## 2024-04-28 PROCEDURE — 2500000004 HC RX 250 GENERAL PHARMACY W/ HCPCS (ALT 636 FOR OP/ED)

## 2024-04-28 PROCEDURE — C9113 INJ PANTOPRAZOLE SODIUM, VIA: HCPCS

## 2024-04-28 PROCEDURE — 99232 SBSQ HOSP IP/OBS MODERATE 35: CPT | Performed by: INTERNAL MEDICINE

## 2024-04-28 PROCEDURE — 85520 HEPARIN ASSAY: CPT | Performed by: INTERNAL MEDICINE

## 2024-04-28 PROCEDURE — 1200000002 HC GENERAL ROOM WITH TELEMETRY DAILY

## 2024-04-28 PROCEDURE — 2500000004 HC RX 250 GENERAL PHARMACY W/ HCPCS (ALT 636 FOR OP/ED): Performed by: INTERNAL MEDICINE

## 2024-04-28 RX ADMIN — LISINOPRIL 5 MG: 5 TABLET ORAL at 08:14

## 2024-04-28 RX ADMIN — PANTOPRAZOLE SODIUM 40 MG: 40 INJECTION, POWDER, FOR SOLUTION INTRAVENOUS at 05:23

## 2024-04-28 RX ADMIN — AMOXICILLIN AND CLAVULANATE POTASSIUM 1 TABLET: 875; 125 TABLET, FILM COATED ORAL at 08:01

## 2024-04-28 RX ADMIN — AMPICILLIN SODIUM AND SULBACTAM SODIUM 3 G: 2; 1 INJECTION, POWDER, FOR SOLUTION INTRAMUSCULAR; INTRAVENOUS at 17:55

## 2024-04-28 RX ADMIN — ASPIRIN 81 MG 81 MG: 81 TABLET ORAL at 08:01

## 2024-04-28 RX ADMIN — OXYCODONE HYDROCHLORIDE 10 MG: 10 TABLET ORAL at 05:23

## 2024-04-28 RX ADMIN — OXYCODONE HYDROCHLORIDE 10 MG: 10 TABLET ORAL at 15:10

## 2024-04-28 RX ADMIN — METOPROLOL SUCCINATE 25 MG: 25 TABLET, EXTENDED RELEASE ORAL at 08:01

## 2024-04-28 RX ADMIN — HEPARIN SODIUM 930 UNITS/HR: 10000 INJECTION, SOLUTION INTRAVENOUS at 16:06

## 2024-04-28 RX ADMIN — ATORVASTATIN CALCIUM 80 MG: 80 TABLET, FILM COATED ORAL at 20:21

## 2024-04-28 RX ADMIN — CLOBETASOL PROPIONATE: 0.5 CREAM TOPICAL at 20:21

## 2024-04-28 RX ADMIN — AMPICILLIN SODIUM AND SULBACTAM SODIUM 3 G: 2; 1 INJECTION, POWDER, FOR SOLUTION INTRAMUSCULAR; INTRAVENOUS at 23:35

## 2024-04-28 RX ADMIN — AMPICILLIN SODIUM AND SULBACTAM SODIUM 3 G: 2; 1 INJECTION, POWDER, FOR SOLUTION INTRAMUSCULAR; INTRAVENOUS at 13:24

## 2024-04-28 RX ADMIN — CITALOPRAM HYDROBROMIDE 20 MG: 20 TABLET ORAL at 08:01

## 2024-04-28 RX ADMIN — CLOBETASOL PROPIONATE: 0.5 CREAM TOPICAL at 08:17

## 2024-04-28 RX ADMIN — OXYCODONE HYDROCHLORIDE 10 MG: 10 TABLET ORAL at 21:17

## 2024-04-28 RX ADMIN — LEVOTHYROXINE SODIUM 100 MCG: 100 TABLET ORAL at 05:23

## 2024-04-28 RX ADMIN — CLOPIDOGREL 75 MG: 75 TABLET ORAL at 20:21

## 2024-04-28 ASSESSMENT — COGNITIVE AND FUNCTIONAL STATUS - GENERAL
DAILY ACTIVITIY SCORE: 24
DAILY ACTIVITIY SCORE: 24
MOBILITY SCORE: 24
MOBILITY SCORE: 24

## 2024-04-28 ASSESSMENT — PAIN - FUNCTIONAL ASSESSMENT
PAIN_FUNCTIONAL_ASSESSMENT: 0-10

## 2024-04-28 ASSESSMENT — PAIN SCALES - GENERAL
PAINLEVEL_OUTOF10: 3
PAINLEVEL_OUTOF10: 8
PAINLEVEL_OUTOF10: 3

## 2024-04-28 ASSESSMENT — PAIN DESCRIPTION - ORIENTATION
ORIENTATION: LEFT
ORIENTATION: LEFT

## 2024-04-28 ASSESSMENT — PAIN DESCRIPTION - LOCATION
LOCATION: JAW
LOCATION: JAW

## 2024-04-28 NOTE — CARE PLAN
The patient's goals for the shift include      The clinical goals for the shift include patient pain will decrease throughout shift    Over the shift, the patient did not make progress toward the following goals. Barriers to progression include . Recommendations to address these barriers include .  Problem: Pain - Adult  Goal: Verbalizes/displays adequate comfort level or baseline comfort level  Outcome: Progressing     Problem: Safety - Adult  Goal: Free from fall injury  Outcome: Progressing

## 2024-04-28 NOTE — CONSULTS
Consults  Referred by AMISH Kimball MD: Lenora Cobb MD    Reason For Consult  Neck soft tissue infection    History Of Present Illness  Aamir Grossman is a 68 y.o. male, hx of CAD, hx of HTN, hx of hypothyroidism, hx of psoriasis, he was admitted for chest pain, he was noted to have Lt mandibular pain and swelling, US showed edema without collection, the WBC are N, no fever, no drainage, no sob, no dysphagia     Past Medical History  He has a past medical history of Coronary artery disease, Encounter for immunization (2016), Encounter for screening, unspecified (2015), GERD (gastroesophageal reflux disease), Hypertension, Hypothyroidism, Myocardial infarction (Multi), Other specified anxiety disorders, Personal history of diseases of the skin and subcutaneous tissue, Personal history of nicotine dependence (10/20/2014), Personal history of other diseases of the circulatory system (06/10/2016), Personal history of other diseases of the circulatory system (2017), Personal history of other diseases of the circulatory system, Personal history of other diseases of the circulatory system, Personal history of other diseases of the respiratory system (2015), Personal history of other endocrine, nutritional and metabolic disease, Pure hypercholesterolemia, unspecified (10/25/2022), and Tendinopathy of left rotator cuff (2023).    Surgical History  He has a past surgical history that includes Tonsillectomy (2018); Rotator cuff repair (Left, 2018); Other surgical history (2014); Other surgical history (2014); Cardiac surgery; and Coronary stent placement.     Social History     Occupational History    Not on file   Tobacco Use    Smoking status: Former     Current packs/day: 0.00     Average packs/day: 1 pack/day for 20.0 years (20.0 ttl pk-yrs)     Types: Cigarettes, Cigars     Start date:      Quit date: 2000     Years since quittin.3    Smokeless  tobacco: Never    Tobacco comments:     Smokes cigars occassionally   Vaping Use    Vaping status: Never Used   Substance and Sexual Activity    Alcohol use: Yes     Comment: 1 x week    Drug use: Never    Sexual activity: Defer     Travel History   Travel since 03/28/24    No documented travel since 03/28/24            Family History  Family History   Problem Relation Name Age of Onset    Hypertension Mother      Hyperlipidemia Mother      Osteoporosis Mother      Coronary artery disease Father       Allergies  Penicillin     Immunization History   Administered Date(s) Administered    Flu vaccine (IIV4), preservative free *Check age/dose* 08/29/2018    Flu vaccine, quadrivalent, high-dose, preservative free, age 65y+ (FLUZONE) 09/12/2023    Influenza, High Dose Seasonal, Preservative Free 10/18/2019, 11/18/2020, 09/08/2021, 09/07/2022    Influenza, seasonal, injectable 09/05/2013, 10/20/2014, 09/30/2015, 09/20/2016, 09/07/2022    Moderna COVID-19 vaccine, bivalent, blue cap/gray label *Check age/dose* 12/17/2022    Moderna SARS-CoV-2 Vaccination 02/23/2021, 03/31/2021, 09/01/2021, 04/01/2022    Pneumococcal Conjugate PCV 7 1955    Pneumococcal polysaccharide vaccine, 23-valent, age 2 years and older (PNEUMOVAX 23) 10/30/2017    Pneumococcal, Unspecified 07/17/2019, 08/18/2020    Tdap vaccine, age 7 year and older (BOOSTRIX, ADACEL) 10/31/2015    Zoster vaccine, recombinant, adult (SHINGRIX) 09/01/2020, 11/17/2020   Pneumonia vaccine is up to date  Millan fall risk 35, preventive protocol was implemented  Depression screen is negative    Medications  Home medications:  Medications Prior to Admission   Medication Sig Dispense Refill Last Dose    citalopram (CeleXA) 20 mg tablet Take 1 tablet (20 mg) by mouth once daily. 90 tablet 1 4/25/2024    clopidogrel (Plavix) 75 mg tablet TAKE 1 TABLET DAILY 90 tablet 3 4/25/2024    levothyroxine (Synthroid, Levoxyl) 100 mcg tablet TAKE 1 TABLET DAILY 90 tablet 0 4/25/2024     lisinopril 2.5 mg tablet Take 1 tablet (2.5 mg) by mouth once daily.   4/25/2024    metoprolol succinate XL (Toprol-XL) 25 mg 24 hr tablet Take 1 tablet (25 mg) by mouth once daily.   4/25/2024    omeprazole (PriLOSEC) 40 mg DR capsule Take 1 capsule (40 mg) by mouth once daily in the morning. Take before meals. Do not crush or chew. 30 capsule 3 4/25/2024    rosuvastatin (Crestor) 20 mg tablet Take 1 tablet (20 mg) by mouth once daily.   4/25/2024    aspirin 81 mg EC tablet Take 1 tablet (81 mg) by mouth once daily.   Unknown    clobetasol (Temovate) 0.05 % ointment    Unknown    ixekizumab (Taltz Syringe) 80 mg/mL injection Inject 1 mL (80 mg) under the skin.   Unknown     Current medications:  Scheduled medications  ampicillin-sulbactam, 3 g, intravenous, q6h  aspirin, 81 mg, oral, Daily  atorvastatin, 80 mg, oral, Nightly  citalopram, 20 mg, oral, Daily  clobetasol, , Topical, BID  clopidogrel, 75 mg, oral, Daily  levothyroxine, 100 mcg, oral, Daily  lisinopril, 5 mg, oral, Daily  metoprolol succinate XL, 25 mg, oral, Daily  pantoprazole, 40 mg, intravenous, Daily      Continuous medications  heparin, 0-4,000 Units/hr, Last Rate: 930 Units/hr (04/28/24 1606)      PRN medications  PRN medications: acetaminophen **OR** acetaminophen **OR** acetaminophen, heparin, nitroglycerin, oxyCODONE, oxyCODONE    Review of Systems   All other systems reviewed and are negative.       Objective  Range of Vitals (last 24 hours)  Heart Rate:  [63-80]   Temp:  [36.1 °C (97 °F)-37.2 °C (99 °F)]   Resp:  [16-20]   BP: (103-157)/(67-89)   Weight:  [73.6 kg (162 lb 4.1 oz)]   SpO2:  [96 %-97 %]   Daily Weight  04/28/24 : 73.6 kg (162 lb 4.1 oz)    Body mass index is 23.96 kg/m².     Physical Exam  Constitutional:       Appearance: Normal appearance.   HENT:      Head: Normocephalic and atraumatic.      Mouth/Throat:      Mouth: Mucous membranes are moist.      Pharynx: Oropharynx is clear.      Comments: Dental caries, Lt facial  "swelling, tenderness over the Lt mandible  Eyes:      Pupils: Pupils are equal, round, and reactive to light.   Cardiovascular:      Rate and Rhythm: Normal rate and regular rhythm.      Heart sounds: Normal heart sounds.   Pulmonary:      Effort: Pulmonary effort is normal.      Breath sounds: Normal breath sounds.   Abdominal:      General: Abdomen is flat. Bowel sounds are normal.      Palpations: Abdomen is soft.   Musculoskeletal:      Cervical back: Normal range of motion.   Neurological:      Mental Status: He is alert.          Relevant Results  Outside Hospital Results  reviewed  Labs  Results from last 72 hours   Lab Units 04/27/24  0557 04/26/24  1043   WBC AUTO x10*3/uL 6.8 8.3   HEMOGLOBIN g/dL 12.9* 14.3   HEMATOCRIT % 37.3* 40.6*   PLATELETS AUTO x10*3/uL 86* 109*   NEUTROS PCT AUTO %  --  74.9   LYMPHS PCT AUTO %  --  14.5   MONOS PCT AUTO %  --  8.7   EOS PCT AUTO %  --  1.3     Results from last 72 hours   Lab Units 04/27/24  0557 04/26/24  1043   SODIUM mmol/L 132* 136   POTASSIUM mmol/L 4.1 4.5   CHLORIDE mmol/L 99 101   CO2 mmol/L 23 21   BUN mg/dL 16 20   CREATININE mg/dL 0.76 1.33*   GLUCOSE mg/dL 76 96   CALCIUM mg/dL 9.0 9.9   ANION GAP mmol/L 14 19   EGFR mL/min/1.73m*2 >90 58*   PHOSPHORUS mg/dL 3.4  --      Results from last 72 hours   Lab Units 04/27/24  0557 04/26/24  1043   ALK PHOS U/L  --  93   BILIRUBIN TOTAL mg/dL  --  0.9   PROTEIN TOTAL g/dL  --  7.4   ALT U/L  --  33   AST U/L  --  33   ALBUMIN g/dL 4.0 4.5     Estimated Creatinine Clearance: 90.4 mL/min (by C-G formula based on SCr of 0.76 mg/dL).  No results found for: \"CRP\", \"SEDRATE\"  No results found for: \"HIV1X2\", \"HIVCONF\", \"AQAQQF7DQ\"  No results found for: \"HEPCABINIT\", \"HEPCAB\", \"HCVPCRQUANT\"  Microbiology  Reviewed  Imaging  Reviewed       Assessment/Plan     Mouth / Left facial cellulitis    Recommendations :  Continue Unasyn      I spent minutes in the professional and overall care of this patient.      Tim " MD Patrick

## 2024-04-28 NOTE — PROGRESS NOTES
Aamir Grossman is a 68 y.o. male on day 2 of admission presenting with NSTEMI (non-ST elevated myocardial infarction) (Multi).      Subjective   Swelling slightly worse today. Still having pain around his tooth. No acute events.        Objective     Last Recorded Vitals  /67 (BP Location: Left arm, Patient Position: Lying)   Pulse 67   Temp 36.2 °C (97.2 °F) (Temporal)   Resp 16   Wt 73.6 kg (162 lb 4.1 oz)   SpO2 96%   Intake/Output last 3 Shifts:    Intake/Output Summary (Last 24 hours) at 4/28/2024 1410  Last data filed at 4/28/2024 1314  Gross per 24 hour   Intake 600 ml   Output 975 ml   Net -375 ml       Admission Weight  Weight: 74.3 kg (163 lb 12.8 oz) (04/26/24 2032)    Daily Weight  04/28/24 : 73.6 kg (162 lb 4.1 oz)    Image Results  US neck  Narrative: Interpreted By:  Alondra Rand,   STUDY:  US NECK; ;  4/27/2024 4:56 pm      INDICATION:  Signs/Symptoms:swelling to L mandible, lymph node vs abscess; rule  out abscess.      COMPARISON:  None.      ACCESSION NUMBER(S):  [id number]      ORDERING CLINICIAN:  MASHA WILSON      TECHNIQUE:  Targeted grayscale ultrasound was performed at the patient's area of  concern at the left mandible. The contralateral right side was  scanned for comparison.  Selected color and spectral Doppler  ultrasound images were obtained.      FINDINGS:  Targeted ultrasound scanning at the patient's area of concern  overlying the left mandible demonstrates diffuse soft tissue edema  with increased vascularity. Increased vascularity was also noted at  the contralateral soft tissues overlying the right mandible. No  organized fluid collection identified. Mildly enlarged level 3 lymph  nodes are noted measuring on the left 1.7 x 0.5 x 0.9 cm and on the  right 2.2 x 0.5 x 0.8 cm.      Impression: 1. Diffuse soft tissue edema with increased vascularity at the  patient's area of concern overlying the left mandible, suggestive of  cellulitis. Increased vascularity was  also noted at the contralateral  soft tissues overlying the right mandible. Please correlate with  clinical exam. No sonographic evidence for abscess formation. If  there is persistent clinical concern, contrast-enhanced CT soft  tissue neck can be obtained for further evaluation.  2. Mild bilateral cervical adenopathy.          MACRO:  None      Signed by: Alondra Rand 4/27/2024 9:29 PM  Dictation workstation:   [id number]      Physical Exam  Gen: lying comfortably in bed, not in acute distress  HEENT: swollen mobile area to L lower jaw, c/w swollen lymph node  Pulm: normal respiratory effort, clear to auscultation b/l  Cardiac: RRR, no murmurs noted, normal S1/S2  GI: Soft, nontender, BS+  Extremities: no LE edema, cyanosis  Neuro: AOX3, CN II-XII grossly intact, equal b/l strength, no loss in sensation   Psych: calm and appropriate for situation    Relevant Results  Scheduled medications  ampicillin-sulbactam, 3 g, intravenous, q6h  aspirin, 81 mg, oral, Daily  atorvastatin, 80 mg, oral, Nightly  citalopram, 20 mg, oral, Daily  clobetasol, , Topical, BID  clopidogrel, 75 mg, oral, Daily  levothyroxine, 100 mcg, oral, Daily  lisinopril, 5 mg, oral, Daily  metoprolol succinate XL, 25 mg, oral, Daily  pantoprazole, 40 mg, intravenous, Daily      Continuous medications  heparin, 0-4,000 Units/hr, Last Rate: 1,000 Units/hr (04/28/24 0922)      PRN medications  PRN medications: acetaminophen **OR** acetaminophen **OR** acetaminophen, heparin, nitroglycerin, oxyCODONE, oxyCODONE    Results for orders placed or performed during the hospital encounter of 04/26/24 (from the past 24 hour(s))   Heparin Assay, UFH   Result Value Ref Range    Heparin Unfractionated 0.3 See Comment Below for Therapeutic Ranges IU/mL   Green Top   Result Value Ref Range    Extra Tube Hold for add-ons.    Lavender Top   Result Value Ref Range    Extra Tube Hold for add-ons.        US neck    Result Date: 4/27/2024  Interpreted By:   Alondra Rand, STUDY: US NECK; ;  4/27/2024 4:56 pm   INDICATION: Signs/Symptoms:swelling to L mandible, lymph node vs abscess; rule out abscess.   COMPARISON: None.   ACCESSION NUMBER(S): [id number]   ORDERING CLINICIAN: MASHA WILSON   TECHNIQUE: Targeted grayscale ultrasound was performed at the patient's area of concern at the left mandible. The contralateral right side was scanned for comparison.  Selected color and spectral Doppler ultrasound images were obtained.   FINDINGS: Targeted ultrasound scanning at the patient's area of concern overlying the left mandible demonstrates diffuse soft tissue edema with increased vascularity. Increased vascularity was also noted at the contralateral soft tissues overlying the right mandible. No organized fluid collection identified. Mildly enlarged level 3 lymph nodes are noted measuring on the left 1.7 x 0.5 x 0.9 cm and on the right 2.2 x 0.5 x 0.8 cm.       1. Diffuse soft tissue edema with increased vascularity at the patient's area of concern overlying the left mandible, suggestive of cellulitis. Increased vascularity was also noted at the contralateral soft tissues overlying the right mandible. Please correlate with clinical exam. No sonographic evidence for abscess formation. If there is persistent clinical concern, contrast-enhanced CT soft tissue neck can be obtained for further evaluation. 2. Mild bilateral cervical adenopathy.     MACRO: None   Signed by: Alondra Rand 4/27/2024 9:29 PM Dictation workstation:   [id number]         Assessment/Plan      Principal Problem:    NSTEMI (non-ST elevated myocardial infarction) (Multi)    Aamir Grossman is a 68 y.o. male with Pmhx of CAD/ACS sp 3 stents, HTN, HLD, hypothyroidism, Psoriasis who is transferred for chest pain, cf ACS         #atypical chest pain, elevated trops   #cf NSTEMI, hx of CAD/ACS with 3 stents, HTN, HLD    #possible GERD/dyspepsia from ibuprofen use   -ASA loaded, heparin drip started in  ED.   -Followed with Plavix.  Continue DAPT maintenance dosing and heparin drip.  Started on high intensity statin and will continue home beta-blocker  -cont PPI  -Nitroglycerin PRN  -Cardiology following, plan on [facility] tomorrow  -NPO midnight      #dental caries w infection/?abscess   -facial swelling is worsening; will transition to unasyn  -consult ID  -will consider CT neck if no improvement with abx; US did not show abscess  -Augmentin until follow up with dentist appt on Tuesday  -oxy for dental pain     #ALEXA, likely prerenal from ibupforen use.   -stable creatinine  -IVF PRN, did receive contrast in ED  -holding home ACEi     #hypothyroidism: synthroid   #psoriasis: clobetasol, holding humira    #TERRELL: lexapro      Fluids: PRN   Electrolytes: PRN  Nutrition: cardiac   GI Prophylaxis: ppi  DVT Prophylaxis: heparin gtt  CODE: FULL     Dispo: Admitted for chest pain and elevated trops concerning for NSTEMI.  On NSTEMI protocol and cardiology consulted. eLOS>48hrs.           Oscar Kimball, DO

## 2024-04-28 NOTE — NURSING NOTE
Assumed care of patient.     Patients left jaw and neck swollen. MD aware. Antibiotic changed to IV, ID consult placed.    Continues on Heparin drip at 9.3. Heparin Assay 0.3 this AM.     Patient compliant with all care.    NERY BURTON LPN

## 2024-04-29 ENCOUNTER — APPOINTMENT (OUTPATIENT)
Dept: CARDIOLOGY | Facility: HOSPITAL | Age: 69
DRG: 281 | End: 2024-04-29
Payer: MEDICARE

## 2024-04-29 ENCOUNTER — APPOINTMENT (OUTPATIENT)
Dept: CARDIOLOGY | Facility: HOSPITAL | Age: 69
End: 2024-04-29
Payer: MEDICARE

## 2024-04-29 VITALS
RESPIRATION RATE: 17 BRPM | SYSTOLIC BLOOD PRESSURE: 112 MMHG | DIASTOLIC BLOOD PRESSURE: 71 MMHG | HEIGHT: 69 IN | HEART RATE: 54 BPM | TEMPERATURE: 97.2 F | WEIGHT: 162.26 LBS | OXYGEN SATURATION: 97 % | BODY MASS INDEX: 24.03 KG/M2

## 2024-04-29 LAB
ANION GAP SERPL CALC-SCNC: 13 MMOL/L (ref 10–20)
AORTIC VALVE MEAN GRADIENT: 3.3 MMHG
AORTIC VALVE PEAK VELOCITY: 1.3 M/S
AV PEAK GRADIENT: 6.8 MMHG
AVA (PEAK VEL): 2.45 CM2
AVA (VTI): 2.55 CM2
BUN SERPL-MCNC: 12 MG/DL (ref 6–23)
CALCIUM SERPL-MCNC: 9.2 MG/DL (ref 8.6–10.3)
CHLORIDE SERPL-SCNC: 100 MMOL/L (ref 98–107)
CO2 SERPL-SCNC: 26 MMOL/L (ref 21–32)
CREAT SERPL-MCNC: 0.69 MG/DL (ref 0.5–1.3)
EGFRCR SERPLBLD CKD-EPI 2021: >90 ML/MIN/1.73M*2
EJECTION FRACTION APICAL 4 CHAMBER: 64
ERYTHROCYTE [DISTWIDTH] IN BLOOD BY AUTOMATED COUNT: 11.2 % (ref 11.5–14.5)
GLUCOSE SERPL-MCNC: 93 MG/DL (ref 74–99)
HCT VFR BLD AUTO: 35.9 % (ref 41–52)
HGB BLD-MCNC: 12.5 G/DL (ref 13.5–17.5)
LEFT ATRIUM VOLUME AREA LENGTH INDEX BSA: 20.1 ML/M2
LEFT VENTRICLE INTERNAL DIMENSION DIASTOLE: 4.87 CM (ref 3.5–6)
LEFT VENTRICULAR OUTFLOW TRACT DIAMETER: 2.04 CM
LV EJECTION FRACTION BIPLANE: 63 %
MCH RBC QN AUTO: 36.5 PG (ref 26–34)
MCHC RBC AUTO-ENTMCNC: 34.8 G/DL (ref 32–36)
MCV RBC AUTO: 105 FL (ref 80–100)
MITRAL VALVE E/A RATIO: 0.73
MITRAL VALVE E/E' RATIO: 7.22
NRBC BLD-RTO: 0 /100 WBCS (ref 0–0)
PLATELET # BLD AUTO: 83 X10*3/UL (ref 150–450)
POTASSIUM SERPL-SCNC: 3.5 MMOL/L (ref 3.5–5.3)
RBC # BLD AUTO: 3.42 X10*6/UL (ref 4.5–5.9)
RIGHT VENTRICLE FREE WALL PEAK S': 12 CM/S
RIGHT VENTRICLE PEAK SYSTOLIC PRESSURE: 27.3 MMHG
SODIUM SERPL-SCNC: 135 MMOL/L (ref 136–145)
TRICUSPID ANNULAR PLANE SYSTOLIC EXCURSION: 1.8 CM
UFH PPP CHRO-ACNC: 0.2 IU/ML
WBC # BLD AUTO: 5.6 X10*3/UL (ref 4.4–11.3)

## 2024-04-29 PROCEDURE — C1760 CLOSURE DEV, VASC: HCPCS | Performed by: INTERNAL MEDICINE

## 2024-04-29 PROCEDURE — 2720000007 HC OR 272 NO HCPCS: Performed by: INTERNAL MEDICINE

## 2024-04-29 PROCEDURE — 80048 BASIC METABOLIC PNL TOTAL CA: CPT | Performed by: INTERNAL MEDICINE

## 2024-04-29 PROCEDURE — 7100000009 HC PHASE TWO TIME - INITIAL BASE CHARGE: Performed by: INTERNAL MEDICINE

## 2024-04-29 PROCEDURE — 2500000005 HC RX 250 GENERAL PHARMACY W/O HCPCS: Performed by: INTERNAL MEDICINE

## 2024-04-29 PROCEDURE — B2111ZZ FLUOROSCOPY OF MULTIPLE CORONARY ARTERIES USING LOW OSMOLAR CONTRAST: ICD-10-PCS | Performed by: INTERNAL MEDICINE

## 2024-04-29 PROCEDURE — 93306 TTE W/DOPPLER COMPLETE: CPT

## 2024-04-29 PROCEDURE — 99239 HOSP IP/OBS DSCHRG MGMT >30: CPT | Performed by: INTERNAL MEDICINE

## 2024-04-29 PROCEDURE — 4A023N7 MEASUREMENT OF CARDIAC SAMPLING AND PRESSURE, LEFT HEART, PERCUTANEOUS APPROACH: ICD-10-PCS | Performed by: INTERNAL MEDICINE

## 2024-04-29 PROCEDURE — 2500000004 HC RX 250 GENERAL PHARMACY W/ HCPCS (ALT 636 FOR OP/ED)

## 2024-04-29 PROCEDURE — 93454 CORONARY ARTERY ANGIO S&I: CPT | Performed by: INTERNAL MEDICINE

## 2024-04-29 PROCEDURE — 85027 COMPLETE CBC AUTOMATED: CPT | Performed by: INTERNAL MEDICINE

## 2024-04-29 PROCEDURE — 2500000004 HC RX 250 GENERAL PHARMACY W/ HCPCS (ALT 636 FOR OP/ED): Performed by: INTERNAL MEDICINE

## 2024-04-29 PROCEDURE — C9113 INJ PANTOPRAZOLE SODIUM, VIA: HCPCS

## 2024-04-29 PROCEDURE — 2500000001 HC RX 250 WO HCPCS SELF ADMINISTERED DRUGS (ALT 637 FOR MEDICARE OP)

## 2024-04-29 PROCEDURE — 93005 ELECTROCARDIOGRAM TRACING: CPT

## 2024-04-29 PROCEDURE — 36415 COLL VENOUS BLD VENIPUNCTURE: CPT | Performed by: INTERNAL MEDICINE

## 2024-04-29 PROCEDURE — 2780000003 HC OR 278 NO HCPCS: Performed by: INTERNAL MEDICINE

## 2024-04-29 PROCEDURE — C1894 INTRO/SHEATH, NON-LASER: HCPCS | Performed by: INTERNAL MEDICINE

## 2024-04-29 PROCEDURE — 2500000004 HC RX 250 GENERAL PHARMACY W/ HCPCS (ALT 636 FOR OP/ED): Performed by: NURSE PRACTITIONER

## 2024-04-29 PROCEDURE — 7100000010 HC PHASE TWO TIME - EACH INCREMENTAL 1 MINUTE: Performed by: INTERNAL MEDICINE

## 2024-04-29 PROCEDURE — 99152 MOD SED SAME PHYS/QHP 5/>YRS: CPT | Performed by: INTERNAL MEDICINE

## 2024-04-29 PROCEDURE — 85520 HEPARIN ASSAY: CPT | Performed by: INTERNAL MEDICINE

## 2024-04-29 PROCEDURE — 2550000001 HC RX 255 CONTRASTS: Performed by: INTERNAL MEDICINE

## 2024-04-29 RX ORDER — MIDAZOLAM HYDROCHLORIDE 1 MG/ML
INJECTION, SOLUTION INTRAMUSCULAR; INTRAVENOUS AS NEEDED
Status: DISCONTINUED | OUTPATIENT
Start: 2024-04-29 | End: 2024-04-29 | Stop reason: HOSPADM

## 2024-04-29 RX ORDER — AMOXICILLIN AND CLAVULANATE POTASSIUM 875; 125 MG/1; MG/1
1 TABLET, FILM COATED ORAL 2 TIMES DAILY
Qty: 20 TABLET | Refills: 0 | Status: SHIPPED | OUTPATIENT
Start: 2024-04-29

## 2024-04-29 RX ORDER — LISINOPRIL 5 MG/1
5 TABLET ORAL DAILY
Qty: 30 TABLET | Refills: 11 | Status: SHIPPED | OUTPATIENT
Start: 2024-04-30 | End: 2024-05-30

## 2024-04-29 RX ORDER — SODIUM CHLORIDE 9 MG/ML
INJECTION, SOLUTION INTRAVENOUS CONTINUOUS PRN
Status: COMPLETED | OUTPATIENT
Start: 2024-04-29 | End: 2024-04-29

## 2024-04-29 RX ORDER — OXYCODONE HYDROCHLORIDE 5 MG/1
5 TABLET ORAL EVERY 6 HOURS PRN
Qty: 15 TABLET | Refills: 0 | Status: SHIPPED | OUTPATIENT
Start: 2024-04-29

## 2024-04-29 RX ORDER — ATORVASTATIN CALCIUM 80 MG/1
80 TABLET, FILM COATED ORAL NIGHTLY
Qty: 30 TABLET | Refills: 11 | Status: SHIPPED | OUTPATIENT
Start: 2024-04-29 | End: 2024-05-29

## 2024-04-29 RX ORDER — LIDOCAINE HYDROCHLORIDE 20 MG/ML
INJECTION, SOLUTION INFILTRATION; PERINEURAL AS NEEDED
Status: DISCONTINUED | OUTPATIENT
Start: 2024-04-29 | End: 2024-04-29 | Stop reason: HOSPADM

## 2024-04-29 RX ORDER — FENTANYL CITRATE 50 UG/ML
INJECTION, SOLUTION INTRAMUSCULAR; INTRAVENOUS AS NEEDED
Status: DISCONTINUED | OUTPATIENT
Start: 2024-04-29 | End: 2024-04-29 | Stop reason: HOSPADM

## 2024-04-29 RX ADMIN — METOPROLOL SUCCINATE 25 MG: 25 TABLET, EXTENDED RELEASE ORAL at 08:24

## 2024-04-29 RX ADMIN — PANTOPRAZOLE SODIUM 40 MG: 40 INJECTION, POWDER, FOR SOLUTION INTRAVENOUS at 05:23

## 2024-04-29 RX ADMIN — LISINOPRIL 5 MG: 5 TABLET ORAL at 08:24

## 2024-04-29 RX ADMIN — ASPIRIN 81 MG 81 MG: 81 TABLET ORAL at 08:24

## 2024-04-29 RX ADMIN — CITALOPRAM HYDROBROMIDE 20 MG: 20 TABLET ORAL at 08:24

## 2024-04-29 RX ADMIN — CLOPIDOGREL 75 MG: 75 TABLET ORAL at 08:24

## 2024-04-29 RX ADMIN — AMPICILLIN SODIUM AND SULBACTAM SODIUM 3 G: 2; 1 INJECTION, POWDER, FOR SOLUTION INTRAMUSCULAR; INTRAVENOUS at 13:14

## 2024-04-29 RX ADMIN — PERFLUTREN 1 ML OF DILUTION: 6.52 INJECTION, SUSPENSION INTRAVENOUS at 12:00

## 2024-04-29 RX ADMIN — OXYCODONE HYDROCHLORIDE 10 MG: 10 TABLET ORAL at 08:27

## 2024-04-29 RX ADMIN — AMPICILLIN SODIUM AND SULBACTAM SODIUM 3 G: 2; 1 INJECTION, POWDER, FOR SOLUTION INTRAMUSCULAR; INTRAVENOUS at 05:23

## 2024-04-29 ASSESSMENT — PAIN SCALES - GENERAL
PAINLEVEL_OUTOF10: 7
PAINLEVEL_OUTOF10: 0 - NO PAIN

## 2024-04-29 ASSESSMENT — PAIN - FUNCTIONAL ASSESSMENT
PAIN_FUNCTIONAL_ASSESSMENT: 0-10

## 2024-04-29 ASSESSMENT — COGNITIVE AND FUNCTIONAL STATUS - GENERAL
MOBILITY SCORE: 24
DAILY ACTIVITIY SCORE: 24

## 2024-04-29 NOTE — PROGRESS NOTES
04/29/24 1444   Discharge Planning   Living Arrangements Spouse/significant other   Support Systems Spouse/significant other   Assistance Needed Patient is A&Ox3, on room air at baseline, is independent with ADL's and uses no DME at home, drives. Patient denies further needs upon discharge   Type of Residence Private residence   Do you have animals or pets at home? No   Who is requesting discharge planning? Provider   Home or Post Acute Services None   Patient expects to be discharged to: Patient medically ready for discharge. Patient will discharge home and continues to deny any further home going needs and has family at bedside to provide transport home.   Does the patient need discharge transport arranged? No

## 2024-04-29 NOTE — PROGRESS NOTES
Aamir Grossman is a 68 y.o. male on day 3 of admission presenting with NSTEMI (non-ST elevated myocardial infarction) (Multi).    Subjective   Interval History: no fever, no new complaints        Review of Systems    Objective   Range of Vitals (last 24 hours)  Heart Rate:  [50-63]   Temp:  [36.2 °C (97.1 °F)-36.7 °C (98 °F)]   Resp:  [14-20]   BP: (105-127)/(71-83)   Weight:  [73.6 kg (162 lb 4.1 oz)]   SpO2:  [95 %-99 %]   Daily Weight  04/29/24 : 73.6 kg (162 lb 4.1 oz)    Body mass index is 23.96 kg/m².    Physical Exam  Constitutional:       Appearance: Normal appearance.   HENT:      Head: Normocephalic and atraumatic.      Comments: Lt facial swelling over the Lt mandible     Mouth/Throat:      Mouth: Mucous membranes are moist.      Pharynx: Oropharynx is clear.   Eyes:      Pupils: Pupils are equal, round, and reactive to light.   Cardiovascular:      Rate and Rhythm: Normal rate and regular rhythm.      Heart sounds: Normal heart sounds.   Pulmonary:      Effort: Pulmonary effort is normal.      Breath sounds: Normal breath sounds.   Abdominal:      General: Abdomen is flat. Bowel sounds are normal.      Palpations: Abdomen is soft.   Musculoskeletal:      Cervical back: Normal range of motion.   Neurological:      Mental Status: He is alert.         Antibiotics  heparin 25,000 Units in dextrose 5% 250 mL (100 Units/mL) infusion (premix)  heparin bolus from bag 2,000-4,000 Units  aspirin chewable tablet 81 mg  clopidogrel (Plavix) tablet 300 mg  clopidogrel (Plavix) tablet 75 mg  atorvastatin (Lipitor) tablet 80 mg  nitroglycerin (Nitrostat) SL tablet 0.4 mg  oxyCODONE (Roxicodone) immediate release tablet 5 mg  oxyCODONE (Roxicodone) immediate release tablet 10 mg  amoxicillin-pot clavulanate (Augmentin) 875-125 mg per tablet 1 tablet  pantoprazole (ProtoNix) injection 40 mg  lactated Ringer's bolus 1,000 mL  citalopram (CeleXA) tablet 20 mg  levothyroxine (Synthroid, Levoxyl) tablet 100 mcg  clobetasol  "(Temovate) 0.05 % cream  lisinopril tablet 2.5 mg  metoprolol succinate XL (Toprol-XL) 24 hr tablet 25 mg  acetaminophen (Tylenol) tablet 650 mg  acetaminophen (Tylenol) oral liquid 650 mg  acetaminophen (Tylenol) suppository 650 mg  lisinopril tablet 5 mg  ampicillin-sulbactam (Unasyn) 3 g in sodium chloride 0.9 % 100 mL IV  perflutren lipid microspheres (Definity) injection 0.5-10 mL of dilution  sodium chloride 0.9% infusion  - Omnicell Override Pull  lidocaine (Xylocaine) injection  - Omnicell Override Pull  heparin in NS infusion  - Omnicell Override Pull  fentaNYL PF (Sublimaze) injection  - Omnicell Override Pull  midazolam (Versed) injection  - Omnicell Override Pull  sodium chloride 0.9% infusion  midazolam (Versed) injection  fentaNYL PF (Sublimaze) injection  lidocaine (Xylocaine) 20 mg/mL (2 %) injection  iohexol (OMNIPaque) 300 mg iodine/mL solution  lisinopril (Prinivil, Zestril) tablet  atorvastatin (Lipitor) tablet      Relevant Results  Labs  Results from last 72 hours   Lab Units 04/29/24  0607 04/27/24  0557   WBC AUTO x10*3/uL 5.6 6.8   HEMOGLOBIN g/dL 12.5* 12.9*   HEMATOCRIT % 35.9* 37.3*   PLATELETS AUTO x10*3/uL 83* 86*     Results from last 72 hours   Lab Units 04/29/24  0607 04/27/24  0557   SODIUM mmol/L 135* 132*   POTASSIUM mmol/L 3.5 4.1   CHLORIDE mmol/L 100 99   CO2 mmol/L 26 23   BUN mg/dL 12 16   CREATININE mg/dL 0.69 0.76   GLUCOSE mg/dL 93 76   CALCIUM mg/dL 9.2 9.0   ANION GAP mmol/L 13 14   EGFR mL/min/1.73m*2 >90 >90   PHOSPHORUS mg/dL  --  3.4     Results from last 72 hours   Lab Units 04/27/24  0557   ALBUMIN g/dL 4.0     Estimated Creatinine Clearance: 99.6 mL/min (by C-G formula based on SCr of 0.69 mg/dL).  No results found for: \"CRP\"  Microbiology  Reviewed  Imaging  Reviewed            Assessment/Plan     Mouth / Left facial cellulitis     Recommendations :  Continue Unasyn, can change to oral Augmentin with discharge, he has a dental appointment tomorrow  Discussed with " the medical team     I spent minutes in the professional and overall care of this patient.      Tim Nguyen MD

## 2024-04-29 NOTE — CARE PLAN
The patient's goals for the shift include  get heart cath.     The clinical goals for the shift include Patient will remain NPO from midnight until procedure    Pt going home today.

## 2024-04-29 NOTE — CARE PLAN
Problem: Pain - Adult  Goal: Verbalizes/displays adequate comfort level or baseline comfort level  Outcome: Progressing     Problem: Safety - Adult  Goal: Free from fall injury  Outcome: Progressing     Problem: Discharge Planning  Goal: Discharge to home or other facility with appropriate resources  Outcome: Progressing     Problem: Chronic Conditions and Co-morbidities  Goal: Patient's chronic conditions and co-morbidity symptoms are monitored and maintained or improved  Outcome: Progressing     Problem: Pain  Goal: Takes deep breaths with improved pain control throughout the shift  Outcome: Progressing  Goal: Turns in bed with improved pain control throughout the shift  Outcome: Progressing  Goal: Walks with improved pain control throughout the shift  Outcome: Progressing  Goal: Performs ADL's with improved pain control throughout shift  Outcome: Progressing  Goal: Participates in PT with improved pain control throughout the shift  Outcome: Progressing  Goal: Free from opioid side effects throughout the shift  Outcome: Progressing  Goal: Free from acute confusion related to pain meds throughout the shift  Outcome: Progressing     The patient's goals for the shift include  sleep at least 6 hours this shift    The clinical goals for the shift include Patient will remain NPO from midnight until procedure    Over the shift, the patient did not make progress toward the following goals. Barriers to progression include pain. Recommendations to address these barriers include pain meds, cold application, swelling elevation, and rest.

## 2024-04-29 NOTE — POST-PROCEDURE NOTE
Physician Transition of Care Summary  Invasive Cardiovascular Lab    Procedure Date: 4/29/2024  Attending:    Davion Loredo - Primary  Resident/Fellow/Other Assistant: Surgeons and Role:  * No surgeons found with a matching role *    Indications:   Pre-op Diagnosis     * NSTEMI (non-ST elevated myocardial infarction) (Multi) [I21.4]    Post-procedure diagnosis:   Post-op Diagnosis     * NSTEMI (non-ST elevated myocardial infarction) (Multi) [I21.4]    Procedure(s):   Left Heart Cath, No LV  49146 - OK L HRT CATH W/NJX L VENTRICULOGRAPHY IMG S&I        Procedure Findings:   40% MID AND DISTAL RCA WIDELY PATENT LAD STENTS  MEDICAL THERAPY  Description of the Procedure:   [facility]    Complications:   NONE    Stents/Implants:   Implants       No implant documentation for this case.            Anticoagulation/Antiplatelet Plan:   NA    Estimated Blood Loss:   * No values recorded between 4/29/2024 10:53 AM and 4/29/2024 11:23 AM *    Anesthesia: Moderate Sedation Anesthesia Staff: No anesthesia staff entered.    Any Specimen(s) Removed:   No specimens collected during this procedure.    Disposition:   FLOOR       Electronically signed by: Stephane Loredo MD, 4/29/2024 11:23 AM

## 2024-04-29 NOTE — NURSING NOTE
0730: assumed pt care    0932: pt down to test via bed, heoparin is on and running    1247: pt is back from procedure, pt is laying flat, told pt not to sit up until 1330 as stated by cardiology    1252: pt is wondering if he can go home today, asking Dr. Blackwell and Dr. Calderón

## 2024-04-29 NOTE — PROGRESS NOTES
Aamir Grossman is a 68 y.o. male on day 3 of admission presenting with NSTEMI (non-ST elevated myocardial infarction) (Multi).      Subjective   He is now s/p C which showed patent stents and mild mid and distal RCA disease. Complains of sore mouth/tooth      Review of systems:  Constitutional: negative for fever, chills, or malaise  Neuro: negative for dizziness, headache, numbness, tingling  ENT: Negative for nasal congestion or sore throat  Resp: negative for shortness of breath, cough, or wheezing  CV: negative for chest pain, palpitations  GI: negative for abd pain, nausea, vomiting or diarrhea  : negative for dysuria, frequency, or urgency  Skin: negative for lesions, wounds, or rash  Musculoskeletal: Negative for weakness, myalgia, or arthralgia  Endocrine: Negative for polyuria or polydipsia         Objective   Constitutional: Well developed, awake/alert/oriented x3, no distress, alert and cooperative  Eyes: PERRL, EOMI, clear sclera  ENMT: mucous membranes moist, no apparent injury, no lesions seen  Head/Neck: Neck supple, no apparent injury, thyroid without mass or tenderness, No JVD, trachea midline, no bruits  Respiratory/Thorax: Patent airways, CTAB, normal breath sounds with good chest expansion, thorax symmetric  Cardiovascular: Regular, rate and rhythm, no murmurs, 2+ equal pulses of the extremities, normal S 1and S 2  Gastrointestinal: Nondistended, soft, non-tender, no rebound tenderness or guarding, no masses palpable, no organomegaly, +BS, no bruits  Musculoskeletal: ROM intact, no joint swelling, normal strength  Extremities: normal extremities, no cyanosis edema, contusions or wounds, no clubbing  Neurological: alert and oriented x3, intact senses, motor, response and reflexes, normal strength  Lymphatic: No significant lymphadenopathy  Psychological: Appropriate mood and behavior  Skin: Warm and dry, cath site to right groin intact, no swelling or drainage      Last Recorded Vitals  BP  "120/79 (BP Location: Left arm, Patient Position: Lying)   Pulse 53   Temp 36.2 °C (97.1 °F)   Resp 20   Ht 1.753 m (5' 9\")   Wt 73.6 kg (162 lb 4.1 oz)   SpO2 96%   BMI 23.96 kg/m²     Intake/Output last 3 Shifts:  I/O last 3 completed shifts:  In: 1330 (18.1 mL/kg) [P.O.:930; IV Piggyback:400]  Out: 1525 (20.7 mL/kg) [Urine:1525 (0.6 mL/kg/hr)]  Weight: 73.6 kg   I/O this shift:  In: -   Out: 5 [Blood:5]    Relevant Results  Scheduled medications  ampicillin-sulbactam, 3 g, intravenous, q6h  aspirin, 81 mg, oral, Daily  atorvastatin, 80 mg, oral, Nightly  citalopram, 20 mg, oral, Daily  clobetasol, , Topical, BID  clopidogrel, 75 mg, oral, Daily  levothyroxine, 100 mcg, oral, Daily  lisinopril, 5 mg, oral, Daily  metoprolol succinate XL, 25 mg, oral, Daily  pantoprazole, 40 mg, intravenous, Daily      Continuous medications     PRN medications  PRN medications: acetaminophen **OR** acetaminophen **OR** acetaminophen, nitroglycerin, oxyCODONE, oxyCODONE    Results for orders placed or performed during the hospital encounter of 04/26/24 (from the past 24 hour(s))   CBC   Result Value Ref Range    WBC 5.6 4.4 - 11.3 x10*3/uL    nRBC 0.0 0.0 - 0.0 /100 WBCs    RBC 3.42 (L) 4.50 - 5.90 x10*6/uL    Hemoglobin 12.5 (L) 13.5 - 17.5 g/dL    Hematocrit 35.9 (L) 41.0 - 52.0 %     (H) 80 - 100 fL    MCH 36.5 (H) 26.0 - 34.0 pg    MCHC 34.8 32.0 - 36.0 g/dL    RDW 11.2 (L) 11.5 - 14.5 %    Platelets 83 (L) 150 - 450 x10*3/uL   Basic Metabolic Panel   Result Value Ref Range    Glucose 93 74 - 99 mg/dL    Sodium 135 (L) 136 - 145 mmol/L    Potassium 3.5 3.5 - 5.3 mmol/L    Chloride 100 98 - 107 mmol/L    Bicarbonate 26 21 - 32 mmol/L    Anion Gap 13 10 - 20 mmol/L    Urea Nitrogen 12 6 - 23 mg/dL    Creatinine 0.69 0.50 - 1.30 mg/dL    eGFR >90 >60 mL/min/1.73m*2    Calcium 9.2 8.6 - 10.3 mg/dL   Heparin Assay, UFH   Result Value Ref Range    Heparin Unfractionated 0.2 See Comment Below for Therapeutic Ranges IU/mL "       US neck   Final Result   1. Diffuse soft tissue edema with increased vascularity at the   patient's area of concern overlying the left mandible, suggestive of   cellulitis. Increased vascularity was also noted at the contralateral   soft tissues overlying the right mandible. Please correlate with   clinical exam. No sonographic evidence for abscess formation. If   there is persistent clinical concern, contrast-enhanced CT soft   tissue neck can be obtained for further evaluation.   2. Mild bilateral cervical adenopathy.             MACRO:   None        Signed by: Alondra Rand 4/27/2024 9:29 PM   Dictation workstation:   [id number]      Cardiac Catheterization Procedure    (Results Pending)   Transthoracic Echo (TTE) Complete    (Results Pending)       No echocardiogram results found for the past 12 months         Assessment/Plan   Principal Problem:    NSTEMI (non-ST elevated myocardial infarction) (Multi)    6/16/23 Echocardiogram  CONCLUSIONS:  1. Left ventricular systolic function is normal with a 60% estimated ejection fraction.  2. Apical septal segment and mid anteroseptal segment are abnormal.  3. Spectral Doppler shows an impaired relaxation pattern of left ventricular diastolic filling.  4. There is ischemic cardiomyopathy.     4/14/2022 Stress Test: severely reduced uptake in the distal anterior wall and apex, moderately reduced uptake in the mid to distal septum. Scar in the distal anterior wall, apex, distal septum, no ischemia     NSTEMI type 2/ Non MI Elevated Troponin/ Coronary artery disease  - s/p anterior myocardial infarction  - Hx of CAD with stent placement to LAD x 3 in 2013  - Troponin elevated 27->616->5466->3785->2573  - I reviewed the EKG, no acute changes, ST elevation or depression  -CT chest angio negative for PE  - Echocardiogram reviewed as above  -repeat echo  -[facility] showed patent stents and mild mid and distal RCA stenosis  - continue aspirin, plavix, metoprolol, and  atorvastatin     2. Chronic diastolic and systolic CHF, ICMO  -CXR negative  -I reviewed the Echocardiogram from 6/23, EF 60%, ICMO, apical septal segment and mid anteroseptal segment ar abnormal  -Repeat echo  -Strict I & Os  -Daily weights  -2gm na diet  -Cont metoprolol and lisinopril     3. Hypertension  -stable  -2gm na diet  -Cont meds  -monitor     4. Hyperlipidemia  -Cont statin     5. Acute kidney injury, resolved  -Increased most likely from increased NSAID use     6. Tooth Infection  - Continue antibiotic        Ara Mi, BILL-CNP

## 2024-04-30 ENCOUNTER — PATIENT OUTREACH (OUTPATIENT)
Dept: CARE COORDINATION | Facility: CLINIC | Age: 69
End: 2024-04-30
Payer: MEDICARE

## 2024-04-30 LAB
ATRIAL RATE: 58 BPM
P AXIS: 37 DEGREES
P OFFSET: 191 MS
P ONSET: 132 MS
PR INTERVAL: 188 MS
Q ONSET: 226 MS
QRS COUNT: 9 BEATS
QRS DURATION: 80 MS
QT INTERVAL: 430 MS
QTC CALCULATION(BAZETT): 422 MS
QTC FREDERICIA: 424 MS
R AXIS: -38 DEGREES
T AXIS: 42 DEGREES
T OFFSET: 441 MS
VENTRICULAR RATE: 58 BPM

## 2024-04-30 NOTE — PROGRESS NOTES
Outreach call to patient to support a smooth transition of care from recent admission.  Spoke with patient, reviewed discharge medications, discharge instructions, assessed social needs, and provided education on importance of follow-up appointment with provider. Patient states he is doing well, feels good. Reviewed upcoming appointments, patient voiced understanding. No needs identified at this time.  Will continue to monitor through transition period.

## 2024-05-01 ENCOUNTER — TELEPHONE (OUTPATIENT)
Dept: PRIMARY CARE | Facility: CLINIC | Age: 69
End: 2024-05-01
Payer: MEDICARE

## 2024-05-01 NOTE — SIGNIFICANT EVENT
Follow Up Phone Call    Outgoing phone call    Spoke to: Aamir Grossman Relationship:self   Phone number: [phone]      Outcome: contacted patient/ family   No chief complaint on file.         Diagnosis:Not applicable    States he is feeling better. No further questions or concerns.

## 2024-05-01 NOTE — TELEPHONE ENCOUNTER
Transition of Care    Inpatient facility: [facility]  Discharge diagnosis: NSTEMI  Discharged to: Home  Discharge date: 4/29/24  Initial Call date: 5/1/24  Spoke with patient/caregiver: Patient                                                                     Do you need assistance  visits prior to your PCP visit: No  Home health care ordered: No  Have you been contacted by home care and have a start of care date: No  Are you taking medications as prescribed at discharge: Yes    Referral to [facility] Pharmacist: No  Patient advised to bring all medications to PCP follow-up appointment.  Patient advised to follow discharge instructions until provider follow-up.  TCM visit date: 5/2/24  TCM provider visit with: Lenora Cobb MD

## 2024-05-02 ENCOUNTER — OFFICE VISIT (OUTPATIENT)
Dept: PRIMARY CARE | Facility: CLINIC | Age: 69
End: 2024-05-02
Payer: MEDICARE

## 2024-05-02 VITALS
WEIGHT: 169.4 LBS | SYSTOLIC BLOOD PRESSURE: 108 MMHG | BODY MASS INDEX: 25.02 KG/M2 | TEMPERATURE: 97 F | HEART RATE: 63 BPM | DIASTOLIC BLOOD PRESSURE: 72 MMHG | OXYGEN SATURATION: 98 %

## 2024-05-02 DIAGNOSIS — I25.10 CORONARY ARTERY DISEASE INVOLVING NATIVE HEART, UNSPECIFIED VESSEL OR LESION TYPE, UNSPECIFIED WHETHER ANGINA PRESENT: ICD-10-CM

## 2024-05-02 DIAGNOSIS — K21.9 GASTROESOPHAGEAL REFLUX DISEASE WITHOUT ESOPHAGITIS: ICD-10-CM

## 2024-05-02 DIAGNOSIS — I10 BENIGN HYPERTENSION: ICD-10-CM

## 2024-05-02 DIAGNOSIS — I10 HYPERTENSION, UNSPECIFIED TYPE: ICD-10-CM

## 2024-05-02 DIAGNOSIS — I21.4 NON-STEMI (NON-ST ELEVATED MYOCARDIAL INFARCTION) (MULTI): Primary | ICD-10-CM

## 2024-05-02 DIAGNOSIS — E03.9 HYPOTHYROIDISM, UNSPECIFIED TYPE: ICD-10-CM

## 2024-05-02 DIAGNOSIS — E78.00 HYPERCHOLESTEREMIA: ICD-10-CM

## 2024-05-02 PROCEDURE — 1111F DSCHRG MED/CURRENT MED MERGE: CPT | Performed by: INTERNAL MEDICINE

## 2024-05-02 PROCEDURE — 1159F MED LIST DOCD IN RCRD: CPT | Performed by: INTERNAL MEDICINE

## 2024-05-02 PROCEDURE — 1160F RVW MEDS BY RX/DR IN RCRD: CPT | Performed by: INTERNAL MEDICINE

## 2024-05-02 PROCEDURE — 1036F TOBACCO NON-USER: CPT | Performed by: INTERNAL MEDICINE

## 2024-05-02 PROCEDURE — 99496 TRANSJ CARE MGMT HIGH F2F 7D: CPT | Performed by: INTERNAL MEDICINE

## 2024-05-02 PROCEDURE — 3078F DIAST BP <80 MM HG: CPT | Performed by: INTERNAL MEDICINE

## 2024-05-02 PROCEDURE — 3074F SYST BP LT 130 MM HG: CPT | Performed by: INTERNAL MEDICINE

## 2024-05-02 ASSESSMENT — ENCOUNTER SYMPTOMS
ARTHRALGIAS: 0
SINUS PAIN: 0
COUGH: 0
BRUISES/BLEEDS EASILY: 0
DIARRHEA: 0
PALPITATIONS: 0
DIFFICULTY URINATING: 0
FEVER: 0
FATIGUE: 0
ABDOMINAL PAIN: 0
DIZZINESS: 0
SORE THROAT: 0
HEADACHES: 0
WHEEZING: 0
BLOOD IN STOOL: 0
UNEXPECTED WEIGHT CHANGE: 0

## 2024-05-02 NOTE — PROGRESS NOTES
Subjective   Patient ID: Aamir Grossman is a 68 y.o. male who presents for Hospital Follow-up (TCM).    Transition of care  Patient admission history and physical, hospital course, medications, verified and reviewed  Patient contacted after discharge, medications verified comes today for follow-up    Inpatient facility: [facility]  Discharge diagnosis: NSTEMI  Discharged to: Home  Discharge date: 4/29/24  Initial Call date: 5/1/24  Spoke with patient/caregiver: Patient                                                                      Do you need assistance  visits prior to your PCP visit: No  Home health care ordered: No  Have you been contacted by home care and have a start of care date: No  Are you taking medications as prescribed at discharge: Yes     Referral to [facility] Pharmacist: No  Patient advised to bring all medications to PCP follow-up appointment.  Patient advised to follow discharge instructions until provider follow-up.  TCM visit date: 5/2/24  TCM provider visit with: Lenora Cobb MD     Patient admitted for acute left-sided chest pain found to have non-STEMI, CT angiogram ruled out PE cardiology evaluation patient with results of coronary artery disease and stent placement had cardiac catheterization no significant disease maximize medical management discharged home on proton pump inhibitor    - Status post non-STEMI doing well no chest pain or shortness of breath patient started to avoid any NSAIDs in the future  -Coronary artery disease status postcardiac catheterization results reviewed no significant disease continue with aspirin daily  - Hypercholesterolemia continues atorvastatin 80 mg daily follow-up lipid profile in 3 months  -Recent tooth infection continues Augmentin follow-up dentist as recommended Tylenol only for pain  -Reflux disease continue omeprazole 40 mg daily  -Screening for colon cancer obtained obtained in February 2020 4 repeat in 5 years  "2029  --Hypothyroid compensated follow-up thyroid function continues current medication  -BPH compensated  - Abnormal platelets compensated no signs of bleeding  Follow-up for routine physical exam as scheduled                   Review of Systems   Constitutional:  Negative for fatigue, fever and unexpected weight change.   HENT:  Negative for congestion, ear discharge, ear pain, mouth sores, sinus pain and sore throat.    Eyes:  Negative for visual disturbance.   Respiratory:  Negative for cough and wheezing.    Cardiovascular:  Negative for chest pain, palpitations and leg swelling.   Gastrointestinal:  Negative for abdominal pain, blood in stool and diarrhea.   Genitourinary:  Negative for difficulty urinating.   Musculoskeletal:  Negative for arthralgias.   Skin:  Negative for rash.   Neurological:  Negative for dizziness and headaches.   Hematological:  Does not bruise/bleed easily.   Psychiatric/Behavioral:  Negative for behavioral problems.    All other systems reviewed and are negative.      Objective   No results found for: \"HGBA1C\"   /72   Pulse 63   Temp 36.1 °C (97 °F)   Wt 76.8 kg (169 lb 6.4 oz)   SpO2 98%   BMI 25.02 kg/m²     Physical Exam  Vitals and nursing note reviewed.   Constitutional:       Appearance: Normal appearance.   HENT:      Head: Normocephalic.      Nose: Nose normal.   Eyes:      Conjunctiva/sclera: Conjunctivae normal.      Pupils: Pupils are equal, round, and reactive to light.   Cardiovascular:      Rate and Rhythm: Regular rhythm.   Pulmonary:      Effort: Pulmonary effort is normal.      Breath sounds: Normal breath sounds.   Abdominal:      General: Abdomen is flat.      Palpations: Abdomen is soft.   Musculoskeletal:      Cervical back: Neck supple.   Skin:     General: Skin is warm.   Neurological:      General: No focal deficit present.      Mental Status: He is oriented to person, place, and time.   Psychiatric:         Mood and Affect: Mood normal. "         Assessment/Plan   Aamir was seen today for hospital follow-up.  Diagnoses and all orders for this visit:  Non-STEMI (non-ST elevated myocardial infarction) (Multi) (Primary)  Hypertension, unspecified type  Hypercholesteremia  Coronary artery disease involving native heart, unspecified vessel or lesion type, unspecified whether angina present  Benign hypertension  Gastroesophageal reflux disease without esophagitis  Hypothyroidism, unspecified type   Transition of care  Patient admission history and physical, hospital course, medications, verified and reviewed  Patient contacted after discharge, medications verified comes today for follow-up    Inpatient facility: [facility]  Discharge diagnosis: NSTEMI  Discharged to: Home  Discharge date: 4/29/24  Initial Call date: 5/1/24  Spoke with patient/caregiver: Patient                                                                      Do you need assistance  visits prior to your PCP visit: No  Home health care ordered: No  Have you been contacted by home care and have a start of care date: No  Are you taking medications as prescribed at discharge: Yes     Referral to [facility] Pharmacist: No  Patient advised to bring all medications to PCP follow-up appointment.  Patient advised to follow discharge instructions until provider follow-up.  TCM visit date: 5/2/24  TCM provider visit with: Lenora Cobb MD     Patient admitted for acute left-sided chest pain found to have non-STEMI, CT angiogram ruled out PE cardiology evaluation patient with results of coronary artery disease and stent placement had cardiac catheterization no significant disease maximize medical management discharged home on proton pump inhibitor    - Status post non-STEMI doing well no chest pain or shortness of breath patient started to avoid any NSAIDs in the future  -Coronary artery disease status postcardiac catheterization results reviewed no significant disease  continue with aspirin daily  - Hypercholesterolemia continues atorvastatin 80 mg daily follow-up lipid profile in 3 months  -Recent tooth infection continues Augmentin follow-up dentist as recommended Tylenol only for pain  -Reflux disease continue omeprazole 40 mg daily  -Screening for colon cancer obtained obtained in February 2020 4 repeat in 5 years 2029  --Hypothyroid compensated follow-up thyroid function continues current medication  -BPH compensated  - Abnormal platelets compensated no signs of bleeding  Follow-up for routine physical exam as scheduled

## 2024-06-12 ENCOUNTER — PATIENT OUTREACH (OUTPATIENT)
Dept: CARE COORDINATION | Facility: CLINIC | Age: 69
End: 2024-06-12
Payer: MEDICARE

## 2024-06-18 DIAGNOSIS — L40.0 PSORIASIS VULGARIS: ICD-10-CM

## 2024-06-18 RX ORDER — IXEKIZUMAB 80 MG/ML
80 INJECTION, SOLUTION SUBCUTANEOUS
Qty: 1 EACH | Refills: 11 | Status: SHIPPED | OUTPATIENT
Start: 2024-06-18

## 2024-06-20 ENCOUNTER — APPOINTMENT (OUTPATIENT)
Dept: PRIMARY CARE | Facility: CLINIC | Age: 69
End: 2024-06-20
Payer: MEDICARE

## 2024-06-20 VITALS
SYSTOLIC BLOOD PRESSURE: 136 MMHG | DIASTOLIC BLOOD PRESSURE: 90 MMHG | BODY MASS INDEX: 24.54 KG/M2 | TEMPERATURE: 97.9 F | OXYGEN SATURATION: 95 % | WEIGHT: 166.2 LBS | HEART RATE: 90 BPM

## 2024-06-20 DIAGNOSIS — Z12.5 SCREENING FOR PROSTATE CANCER: ICD-10-CM

## 2024-06-20 DIAGNOSIS — R53.83 OTHER FATIGUE: ICD-10-CM

## 2024-06-20 DIAGNOSIS — E55.9 VITAMIN D DEFICIENCY, UNSPECIFIED: ICD-10-CM

## 2024-06-20 DIAGNOSIS — E03.9 HYPOTHYROIDISM, UNSPECIFIED TYPE: ICD-10-CM

## 2024-06-20 DIAGNOSIS — E53.8 VITAMIN B12 DEFICIENCY: ICD-10-CM

## 2024-06-20 DIAGNOSIS — E78.00 HYPERCHOLESTEREMIA: Primary | ICD-10-CM

## 2024-06-20 DIAGNOSIS — I10 BENIGN HYPERTENSION: ICD-10-CM

## 2024-06-20 DIAGNOSIS — I10 HYPERTENSION, UNSPECIFIED TYPE: ICD-10-CM

## 2024-06-20 DIAGNOSIS — I21.4 NON-STEMI (NON-ST ELEVATED MYOCARDIAL INFARCTION) (MULTI): ICD-10-CM

## 2024-06-20 DIAGNOSIS — Z79.899 HIGH RISK MEDICATION USE: ICD-10-CM

## 2024-06-20 PROCEDURE — 1159F MED LIST DOCD IN RCRD: CPT | Performed by: INTERNAL MEDICINE

## 2024-06-20 PROCEDURE — 1036F TOBACCO NON-USER: CPT | Performed by: INTERNAL MEDICINE

## 2024-06-20 PROCEDURE — 3080F DIAST BP >= 90 MM HG: CPT | Performed by: INTERNAL MEDICINE

## 2024-06-20 PROCEDURE — 1160F RVW MEDS BY RX/DR IN RCRD: CPT | Performed by: INTERNAL MEDICINE

## 2024-06-20 PROCEDURE — 99214 OFFICE O/P EST MOD 30 MIN: CPT | Performed by: INTERNAL MEDICINE

## 2024-06-20 PROCEDURE — 3075F SYST BP GE 130 - 139MM HG: CPT | Performed by: INTERNAL MEDICINE

## 2024-06-20 ASSESSMENT — ENCOUNTER SYMPTOMS
FATIGUE: 0
PALPITATIONS: 0
DIARRHEA: 0
DIFFICULTY URINATING: 0
BRUISES/BLEEDS EASILY: 0
WHEEZING: 0
COUGH: 0
ABDOMINAL PAIN: 0
SORE THROAT: 0
BLOOD IN STOOL: 0
DIZZINESS: 0
ARTHRALGIAS: 0
UNEXPECTED WEIGHT CHANGE: 0
FEVER: 0
SINUS PAIN: 0
HEADACHES: 0

## 2024-06-20 NOTE — PROGRESS NOTES
Subjective   Patient ID: Aamir Grossman is a 68 y.o. male who presents for 3 month medical management and Psoriasis (Arms/legs- getting RX from assistance).    - Status post non-STEMI doing well no chest pain or shortness of breath patient started to avoid any NSAIDs in the future  Coronary artery disease status postcardiac catheterization results reviewed no significant disease continue with aspirin daily  Follow-up lipid profile complete blood work and CBC  - Hypercholesterolemia continues atorvastatin 80 mg daily follow-up lipid profile in 3 months  -Psoriasis patient need to restart on Taltz as recommended symptoms worsening upper extremity continue monitor conservatively until injection starts again  -Reflux disease continue omeprazole 40 mg daily  -Screening for colon cancer obtained obtained in February 2020 4 repeat in 5 years 2029  --Hypothyroid compensated follow-up thyroid function continues current medication  -BPH compensated  - Abnormal platelets compensated no signs of bleeding follow-up CBC  Follow-up for routine physical exam as scheduled and Medicare physical                 Psoriasis           Review of Systems   Constitutional:  Negative for fatigue, fever and unexpected weight change.   HENT:  Negative for congestion, ear discharge, ear pain, mouth sores, sinus pain and sore throat.    Eyes:  Negative for visual disturbance.   Respiratory:  Negative for cough and wheezing.    Cardiovascular:  Negative for chest pain, palpitations and leg swelling.   Gastrointestinal:  Negative for abdominal pain, blood in stool and diarrhea.   Genitourinary:  Negative for difficulty urinating.   Musculoskeletal:  Negative for arthralgias.   Skin:  Positive for rash.   Neurological:  Negative for dizziness and headaches.   Hematological:  Does not bruise/bleed easily.   Psychiatric/Behavioral:  Negative for behavioral problems.    All other systems reviewed and are negative.      Objective   No results found  "for: \"HGBA1C\"   /90   Pulse 90   Temp 36.6 °C (97.9 °F)   Wt 75.4 kg (166 lb 3.2 oz)   SpO2 95%   BMI 24.54 kg/m²     Physical Exam  Vitals and nursing note reviewed.   Constitutional:       Appearance: Normal appearance.   HENT:      Head: Normocephalic.      Nose: Nose normal.   Eyes:      Conjunctiva/sclera: Conjunctivae normal.      Pupils: Pupils are equal, round, and reactive to light.   Cardiovascular:      Rate and Rhythm: Regular rhythm.   Pulmonary:      Effort: Pulmonary effort is normal.      Breath sounds: Normal breath sounds.   Abdominal:      General: Abdomen is flat.      Palpations: Abdomen is soft.   Musculoskeletal:      Cervical back: Neck supple.      Right lower leg: No edema.      Left lower leg: No edema.   Skin:     General: Skin is warm.      Findings: Rash (Psoriatec rash) present.   Neurological:      General: No focal deficit present.      Mental Status: He is oriented to person, place, and time.   Psychiatric:         Mood and Affect: Mood normal.         Assessment/Plan   Aamir was seen today for 3 month medical management and psoriasis.  Diagnoses and all orders for this visit:  Hypercholesteremia (Primary)  -     Lipid Panel; Future  High risk medication use  -     CBC and Auto Differential; Future  Hypertension, unspecified type  -     Comprehensive Metabolic Panel; Future  Screening for prostate cancer  -     Prostate Specific Antigen, Screen; Future  Other fatigue  -     TSH with reflex to Free T4 if abnormal; Future  Vitamin B12 deficiency  -     Vitamin B12; Future  Vitamin D deficiency, unspecified  -     Vitamin D 25-Hydroxy,Total (for eval of Vitamin D levels); Future  Non-STEMI (non-ST elevated myocardial infarction) (Multi)  Hypothyroidism, unspecified type  Benign hypertension  Other orders  -     Follow Up In Primary Care - Established  -     Follow Up In Primary Care - Established; Future  -     Follow Up In Primary Care - Medicare Annual; Future   - Status " post non-STEMI doing well no chest pain or shortness of breath patient started to avoid any NSAIDs in the future  Coronary artery disease status postcardiac catheterization results reviewed no significant disease continue with aspirin daily  Follow-up lipid profile complete blood work and CBC  - Hypercholesterolemia continues atorvastatin 80 mg daily follow-up lipid profile in 3 months  -Psoriasis patient need to restart on Taltz as recommended symptoms worsening upper extremity continue monitor conservatively until injection starts again  -Reflux disease continue omeprazole 40 mg daily  -Screening for colon cancer obtained obtained in February 2020 4 repeat in 5 years 2029  --Hypothyroid compensated follow-up thyroid function continues current medication  -BPH compensated  - Abnormal platelets compensated no signs of bleeding follow-up CBC  Follow-up for routine physical exam as scheduled and Medicare physical

## 2024-07-22 DIAGNOSIS — E03.9 ACQUIRED HYPOTHYROIDISM: ICD-10-CM

## 2024-07-22 RX ORDER — LEVOTHYROXINE SODIUM 100 UG/1
100 TABLET ORAL DAILY
Qty: 90 TABLET | Refills: 1 | Status: SHIPPED | OUTPATIENT
Start: 2024-07-22

## 2024-09-18 ENCOUNTER — HOSPITAL ENCOUNTER (EMERGENCY)
Facility: HOSPITAL | Age: 69
Discharge: HOME | End: 2024-09-18
Payer: MEDICARE

## 2024-09-18 ENCOUNTER — APPOINTMENT (OUTPATIENT)
Dept: CARDIOLOGY | Facility: HOSPITAL | Age: 69
End: 2024-09-18
Payer: MEDICARE

## 2024-09-18 ENCOUNTER — APPOINTMENT (OUTPATIENT)
Dept: RADIOLOGY | Facility: HOSPITAL | Age: 69
End: 2024-09-18
Payer: MEDICARE

## 2024-09-18 VITALS
RESPIRATION RATE: 16 BRPM | OXYGEN SATURATION: 99 % | BODY MASS INDEX: 24.44 KG/M2 | WEIGHT: 165 LBS | HEART RATE: 75 BPM | HEIGHT: 69 IN | DIASTOLIC BLOOD PRESSURE: 98 MMHG | TEMPERATURE: 98.1 F | SYSTOLIC BLOOD PRESSURE: 122 MMHG

## 2024-09-18 DIAGNOSIS — R07.9 CHEST PAIN, UNSPECIFIED TYPE: Primary | ICD-10-CM

## 2024-09-18 LAB
ALBUMIN SERPL BCP-MCNC: 4.2 G/DL (ref 3.4–5)
ALP SERPL-CCNC: 102 U/L (ref 33–136)
ALT SERPL W P-5'-P-CCNC: 28 U/L (ref 10–52)
ANION GAP SERPL CALC-SCNC: 15 MMOL/L (ref 10–20)
AST SERPL W P-5'-P-CCNC: 33 U/L (ref 9–39)
BASOPHILS # BLD AUTO: 0.02 X10*3/UL (ref 0–0.1)
BASOPHILS NFR BLD AUTO: 0.3 %
BILIRUB SERPL-MCNC: 0.8 MG/DL (ref 0–1.2)
BNP SERPL-MCNC: 42 PG/ML (ref 0–99)
BUN SERPL-MCNC: 38 MG/DL (ref 6–23)
CALCIUM SERPL-MCNC: 9.8 MG/DL (ref 8.6–10.3)
CARDIAC TROPONIN I PNL SERPL HS: 58 NG/L (ref 0–20)
CARDIAC TROPONIN I PNL SERPL HS: 61 NG/L (ref 0–20)
CHLORIDE SERPL-SCNC: 102 MMOL/L (ref 98–107)
CO2 SERPL-SCNC: 25 MMOL/L (ref 21–32)
CREAT SERPL-MCNC: 1.22 MG/DL (ref 0.5–1.3)
EGFRCR SERPLBLD CKD-EPI 2021: 64 ML/MIN/1.73M*2
EOSINOPHIL # BLD AUTO: 0.01 X10*3/UL (ref 0–0.7)
EOSINOPHIL NFR BLD AUTO: 0.1 %
ERYTHROCYTE [DISTWIDTH] IN BLOOD BY AUTOMATED COUNT: 11.4 % (ref 11.5–14.5)
GLUCOSE SERPL-MCNC: 113 MG/DL (ref 74–99)
HCT VFR BLD AUTO: 36 % (ref 41–52)
HGB BLD-MCNC: 12.5 G/DL (ref 13.5–17.5)
IMM GRANULOCYTES # BLD AUTO: 0.03 X10*3/UL (ref 0–0.7)
IMM GRANULOCYTES NFR BLD AUTO: 0.4 % (ref 0–0.9)
INR PPP: 1 (ref 0.9–1.1)
LYMPHOCYTES # BLD AUTO: 0.89 X10*3/UL (ref 1.2–4.8)
LYMPHOCYTES NFR BLD AUTO: 11.6 %
MAGNESIUM SERPL-MCNC: 1.68 MG/DL (ref 1.6–2.4)
MCH RBC QN AUTO: 36.9 PG (ref 26–34)
MCHC RBC AUTO-ENTMCNC: 34.7 G/DL (ref 32–36)
MCV RBC AUTO: 106 FL (ref 80–100)
MONOCYTES # BLD AUTO: 0.79 X10*3/UL (ref 0.1–1)
MONOCYTES NFR BLD AUTO: 10.3 %
NEUTROPHILS # BLD AUTO: 5.9 X10*3/UL (ref 1.2–7.7)
NEUTROPHILS NFR BLD AUTO: 77.3 %
NRBC BLD-RTO: 0 /100 WBCS (ref 0–0)
PLATELET # BLD AUTO: 99 X10*3/UL (ref 150–450)
POTASSIUM SERPL-SCNC: 4.5 MMOL/L (ref 3.5–5.3)
PROT SERPL-MCNC: 7.1 G/DL (ref 6.4–8.2)
PROTHROMBIN TIME: 11.5 SECONDS (ref 9.8–12.8)
RBC # BLD AUTO: 3.39 X10*6/UL (ref 4.5–5.9)
SODIUM SERPL-SCNC: 137 MMOL/L (ref 136–145)
WBC # BLD AUTO: 7.6 X10*3/UL (ref 4.4–11.3)

## 2024-09-18 PROCEDURE — 83880 ASSAY OF NATRIURETIC PEPTIDE: CPT | Performed by: HEALTH CARE PROVIDER

## 2024-09-18 PROCEDURE — 36415 COLL VENOUS BLD VENIPUNCTURE: CPT | Performed by: HEALTH CARE PROVIDER

## 2024-09-18 PROCEDURE — 2500000001 HC RX 250 WO HCPCS SELF ADMINISTERED DRUGS (ALT 637 FOR MEDICARE OP): Performed by: HEALTH CARE PROVIDER

## 2024-09-18 PROCEDURE — 83735 ASSAY OF MAGNESIUM: CPT | Performed by: HEALTH CARE PROVIDER

## 2024-09-18 PROCEDURE — 93005 ELECTROCARDIOGRAM TRACING: CPT

## 2024-09-18 PROCEDURE — 96374 THER/PROPH/DIAG INJ IV PUSH: CPT

## 2024-09-18 PROCEDURE — 71045 X-RAY EXAM CHEST 1 VIEW: CPT | Performed by: RADIOLOGY

## 2024-09-18 PROCEDURE — 2500000004 HC RX 250 GENERAL PHARMACY W/ HCPCS (ALT 636 FOR OP/ED): Performed by: HEALTH CARE PROVIDER

## 2024-09-18 PROCEDURE — 85610 PROTHROMBIN TIME: CPT | Performed by: HEALTH CARE PROVIDER

## 2024-09-18 PROCEDURE — 80053 COMPREHEN METABOLIC PANEL: CPT | Performed by: HEALTH CARE PROVIDER

## 2024-09-18 PROCEDURE — 71045 X-RAY EXAM CHEST 1 VIEW: CPT

## 2024-09-18 PROCEDURE — 84484 ASSAY OF TROPONIN QUANT: CPT | Performed by: HEALTH CARE PROVIDER

## 2024-09-18 PROCEDURE — 99284 EMERGENCY DEPT VISIT MOD MDM: CPT | Mod: 25

## 2024-09-18 PROCEDURE — 85025 COMPLETE CBC W/AUTO DIFF WBC: CPT | Performed by: HEALTH CARE PROVIDER

## 2024-09-18 PROCEDURE — 2500000001 HC RX 250 WO HCPCS SELF ADMINISTERED DRUGS (ALT 637 FOR MEDICARE OP)

## 2024-09-18 RX ORDER — NAPROXEN SODIUM 220 MG/1
324 TABLET, FILM COATED ORAL ONCE
Status: COMPLETED | OUTPATIENT
Start: 2024-09-18 | End: 2024-09-18

## 2024-09-18 RX ORDER — NAPROXEN SODIUM 220 MG/1
TABLET, FILM COATED ORAL
Status: COMPLETED
Start: 2024-09-18 | End: 2024-09-18

## 2024-09-18 RX ORDER — HYDROCODONE BITARTRATE AND ACETAMINOPHEN 5; 325 MG/1; MG/1
1 TABLET ORAL ONCE
Status: COMPLETED | OUTPATIENT
Start: 2024-09-18 | End: 2024-09-18

## 2024-09-18 RX ORDER — ONDANSETRON HYDROCHLORIDE 2 MG/ML
4 INJECTION, SOLUTION INTRAVENOUS ONCE
Status: COMPLETED | OUTPATIENT
Start: 2024-09-18 | End: 2024-09-18

## 2024-09-18 ASSESSMENT — PAIN SCALES - PAIN ASSESSMENT IN ADVANCED DEMENTIA (PAINAD): TOTALSCORE: MEDICATION (SEE MAR)

## 2024-09-18 ASSESSMENT — PAIN DESCRIPTION - ONSET: ONSET: SUDDEN

## 2024-09-18 ASSESSMENT — PAIN DESCRIPTION - LOCATION: LOCATION: CHEST

## 2024-09-18 ASSESSMENT — PAIN DESCRIPTION - FREQUENCY: FREQUENCY: CONSTANT/CONTINUOUS

## 2024-09-18 ASSESSMENT — PAIN SCALES - GENERAL: PAINLEVEL_OUTOF10: 2

## 2024-09-18 ASSESSMENT — PAIN DESCRIPTION - ORIENTATION: ORIENTATION: RIGHT;LEFT

## 2024-09-18 ASSESSMENT — COLUMBIA-SUICIDE SEVERITY RATING SCALE - C-SSRS
1. IN THE PAST MONTH, HAVE YOU WISHED YOU WERE DEAD OR WISHED YOU COULD GO TO SLEEP AND NOT WAKE UP?: NO
6. HAVE YOU EVER DONE ANYTHING, STARTED TO DO ANYTHING, OR PREPARED TO DO ANYTHING TO END YOUR LIFE?: NO
2. HAVE YOU ACTUALLY HAD ANY THOUGHTS OF KILLING YOURSELF?: NO

## 2024-09-18 ASSESSMENT — PAIN DESCRIPTION - DESCRIPTORS
DESCRIPTORS: GNAWING
DESCRIPTORS: NAGGING

## 2024-09-18 ASSESSMENT — PAIN - FUNCTIONAL ASSESSMENT: PAIN_FUNCTIONAL_ASSESSMENT: 0-10

## 2024-09-18 ASSESSMENT — PAIN DESCRIPTION - PAIN TYPE: TYPE: ACUTE PAIN

## 2024-09-18 ASSESSMENT — PAIN DESCRIPTION - PROGRESSION: CLINICAL_PROGRESSION: GRADUALLY IMPROVING

## 2024-09-18 NOTE — ED PROVIDER NOTES
HPI   Chief Complaint   Patient presents with    Chest Pain     Pt ambulatory to ED with complaints of chest pain. Says he had chest pain last night that made him vomit. Thismorning he had another episode of chest pain as he was getting up. Also complains of tooth pain that he is on antibiotics for currently.       CC: Chest pain  HPI:   68-year-old male presents ED complaining of chest pain, patient reported having episode last night that lasted for approximately 15 minutes, he had another 1 this morning shortly after waking up, he then went to his dentist provider for a tooth infection and he was prescribed clindamycin he has been taking penicillin VK, he reports history of coronary artery disease, stents x 3, he noted some associated nausea, denies any vomiting.     Additional Limitations to History:   External Records Reviewed: I reviewed recent and relevant outside records including   History Obtained From:     Past Medical History: Per HPI  Medications: Reviewed in EMR and with patient  Allergies:  Reviewed in EMR  Past Surgical History:   Social History:     ------------------------------------------------------------------------------------------------------  Physical Exam:  --Vital signs reviewed in nursing triage note, EMR flow sheets, and at patient's bedside  GEN:  A&Ox3, no acute distress, appears comfortable.  Conversational and appropriate.  No confusion or gross mental status changes.  EYES: EOMI, non-injected sclera.  ENT: Moist mucous membranes, no apparent injuries or lesions.   CARDIO: Normal rate and regular rhythm. No murmurs, rubs, or gallops.  2+ equal pulses of the distal extremities.   PULM: Clear to auscultation bilaterally. No rales, rhonchi, or wheezes. Good symmetric chest expansion.  GI: Soft, non-tender, non-distended. No rebound tenderness or guarding.  SKIN: Warm and dry, no rashes or lesions.  MSK: ROM intact the extremities without contractures.   EXT: No peripheral edema,  contusions, or wounds.   NEURO: Cranial nerves II-XII grossly intact. Sensation to light touch intact and equal bilaterally in upper and lower extremities.  Symmetric 5/5 strength in upper and lower extremities.  PSYCH: Appropriate mood and behavior, converses and responds appropriately during exam.  -------------------------------------------------------------------------------------------------------        Differential Diagnoses Considered:   Chronic Medical Conditions Significantly Affecting Care:   Diagnostic testing considered: [PERC, D-Dimer, PECARN, etc.]    - EKG interpreted by myself normal sinus rhythm left axis deviation ventricular rate 88 VA interval 172 normal QRS duration no prolonged QT/QTc no obvious ST elevation, depression or acute ischemic changes second EKG interpreted by myself  Sinus rhythm, left axis deviation, ventricular rate 77 VA interval 192 normal QRS duration no prolonged QT/QTc no obvious ST elevation, depression or acute ischemic findings.  - I independently interpreted: [CXR, CT, POCUS, etc. including your interpretation]  - Labs notable for     Escalation of Care: Appropriate for   Social Determinants of Health Significantly Affecting Care: [Homelessness, lacking transportation, uninsured, unable to afford medications]  Prescription Drug Consideration: [Antibiotics, antivirals, pain medications, etc.]  Discussion of Management with Other Providers:  I discussed the patient/results with: [admitting team, consultant, radiologist, social work, EPAT, case management, PT/OT, RT, PCP, etc.]      Josh Yoder PA-C              Patient History   Past Medical History:   Diagnosis Date    Coronary artery disease     Encounter for immunization 09/20/2016    Influenza vaccination administered at current visit    Encounter for screening, unspecified 09/30/2015    Screening    GERD (gastroesophageal reflux disease)     Hypertension     Hypothyroidism     Myocardial infarction (Multi)     Other  specified anxiety disorders     Depression with anxiety    Personal history of diseases of the skin and subcutaneous tissue     History of psoriasis    Personal history of nicotine dependence 10/20/2014    Former smoker, stopped smoking in distant past    Personal history of other diseases of the circulatory system 06/10/2016    History of hypotension    Personal history of other diseases of the circulatory system 01/11/2017    History of hypertension    Personal history of other diseases of the circulatory system     Personal history of congestive heart failure    Personal history of other diseases of the circulatory system     Personal history of coronary atherosclerosis    Personal history of other diseases of the respiratory system 12/30/2015    History of paranasal sinus congestion    Personal history of other endocrine, nutritional and metabolic disease     History of hypothyroidism    Pure hypercholesterolemia, unspecified 10/25/2022    Hypercholesterolemia    Tendinopathy of left rotator cuff 06/13/2023     Past Surgical History:   Procedure Laterality Date    CARDIAC CATHETERIZATION N/A 4/29/2024    Procedure: Left Heart Cath, No LV;  Surgeon: Stephane Loredo MD;  Location: [facility];  Service: Cardiovascular;  Laterality: N/A;    CARDIAC SURGERY      CORONARY STENT PLACEMENT      OTHER SURGICAL HISTORY  01/21/2014    Previous Stent Placement    OTHER SURGICAL HISTORY  01/21/2014    Ear Surgery    ROTATOR CUFF REPAIR Left 05/29/2018    Rotator Cuff Repair    TONSILLECTOMY  05/29/2018    Tonsillectomy     Family History   Problem Relation Name Age of Onset    Hypertension Mother      Hyperlipidemia Mother      Osteoporosis Mother      Coronary artery disease Father       Social History     Tobacco Use    Smoking status: Former     Current packs/day: 0.00     Average packs/day: 1 pack/day for 20.0 years (20.0 ttl pk-yrs)     Types: Cigarettes, Cigars     Start date: 1980     Quit date: 2000      Years since quittin.7    Smokeless tobacco: Never    Tobacco comments:     Smokes cigars occassionally   Vaping Use    Vaping status: Never Used   Substance Use Topics    Alcohol use: Yes     Comment: 1 x week    Drug use: Never       Physical Exam   ED Triage Vitals   Temperature Heart Rate Respirations BP   24 1355 24 1400 24 1355 24 1355   36.7 °C (98.1 °F) 81 18 (!) 157/106      Pulse Ox Temp Source Heart Rate Source Patient Position   24 1400 24 1355 -- --   98 % Oral        BP Location FiO2 (%)     -- --             Physical Exam      ED Course & MDM   Diagnoses as of 24 1409   Chest pain, unspecified type                 No data recorded                                 Medical Decision Making  68-year-old male with history of coronary artery disease, stents, who recently last night had an episode of chest pain and again this morning both lasting approximately 10 to 15 minutes, patient is hemodynamically stable nontoxic well-hydrated no acute distress, initial troponin 60 1 repeat was 58, his BNP is normal and he has normal-appearing electrolytes, there is does not appear to be any acute ischemic changes on ECG and patient is currently asymptomatic, patient is on Plavix, and recent heart cath and echo done in April did not appear to have any acute or worsening coronary artery stenosis.  This was discussed with the on-call cardiologist Dr. Trevizo, patient is requested to be discharged, cardiology agreed patient can be discharged, he did advise outpatient follow-up with his cardiologist.  Advised patient to return to ED if symptoms recur.        Procedure  Procedures     Josh Yoder PA-C  24 1511       Josh Yoder PA-C  24 1412

## 2024-09-18 NOTE — ED TRIAGE NOTES
Pt ambulatory to ED with complaints of chest pain. Says he had chest pain last night that made him vomit. Thismorning he had another episode of chest pain as he was getting up. Also complains of tooth pain that he is on antibiotics for currently.

## 2024-09-19 ENCOUNTER — APPOINTMENT (OUTPATIENT)
Dept: PRIMARY CARE | Facility: CLINIC | Age: 69
End: 2024-09-19
Payer: MEDICARE

## 2024-09-19 VITALS
WEIGHT: 162 LBS | BODY MASS INDEX: 23.92 KG/M2 | SYSTOLIC BLOOD PRESSURE: 102 MMHG | OXYGEN SATURATION: 98 % | HEART RATE: 83 BPM | DIASTOLIC BLOOD PRESSURE: 76 MMHG | TEMPERATURE: 96.5 F

## 2024-09-19 DIAGNOSIS — I25.10 CORONARY ARTERY DISEASE INVOLVING NATIVE HEART, UNSPECIFIED VESSEL OR LESION TYPE, UNSPECIFIED WHETHER ANGINA PRESENT: ICD-10-CM

## 2024-09-19 DIAGNOSIS — E55.9 VITAMIN D DEFICIENCY, UNSPECIFIED: ICD-10-CM

## 2024-09-19 DIAGNOSIS — E03.9 HYPOTHYROIDISM, UNSPECIFIED TYPE: ICD-10-CM

## 2024-09-19 DIAGNOSIS — K04.7 TOOTH ABSCESS: ICD-10-CM

## 2024-09-19 DIAGNOSIS — E78.00 HYPERCHOLESTEREMIA: Primary | ICD-10-CM

## 2024-09-19 DIAGNOSIS — I10 HYPERTENSION, UNSPECIFIED TYPE: ICD-10-CM

## 2024-09-19 LAB
ATRIAL RATE: 73 BPM
ATRIAL RATE: 88 BPM
P AXIS: 36 DEGREES
P AXIS: 44 DEGREES
P OFFSET: 177 MS
P OFFSET: 182 MS
P ONSET: 122 MS
P ONSET: 128 MS
PR INTERVAL: 172 MS
PR INTERVAL: 180 MS
Q ONSET: 212 MS
Q ONSET: 214 MS
QRS COUNT: 12 BEATS
QRS COUNT: 14 BEATS
QRS DURATION: 76 MS
QRS DURATION: 80 MS
QT INTERVAL: 354 MS
QT INTERVAL: 384 MS
QTC CALCULATION(BAZETT): 423 MS
QTC CALCULATION(BAZETT): 428 MS
QTC FREDERICIA: 402 MS
QTC FREDERICIA: 410 MS
R AXIS: -69 DEGREES
R AXIS: -76 DEGREES
T AXIS: 79 DEGREES
T AXIS: 80 DEGREES
T OFFSET: 391 MS
T OFFSET: 404 MS
VENTRICULAR RATE: 73 BPM
VENTRICULAR RATE: 88 BPM

## 2024-09-19 PROCEDURE — 1036F TOBACCO NON-USER: CPT | Performed by: INTERNAL MEDICINE

## 2024-09-19 PROCEDURE — 3078F DIAST BP <80 MM HG: CPT | Performed by: INTERNAL MEDICINE

## 2024-09-19 PROCEDURE — 3074F SYST BP LT 130 MM HG: CPT | Performed by: INTERNAL MEDICINE

## 2024-09-19 PROCEDURE — 1160F RVW MEDS BY RX/DR IN RCRD: CPT | Performed by: INTERNAL MEDICINE

## 2024-09-19 PROCEDURE — 99214 OFFICE O/P EST MOD 30 MIN: CPT | Performed by: INTERNAL MEDICINE

## 2024-09-19 PROCEDURE — 1159F MED LIST DOCD IN RCRD: CPT | Performed by: INTERNAL MEDICINE

## 2024-09-19 ASSESSMENT — ENCOUNTER SYMPTOMS
PALPITATIONS: 0
COUGH: 0
FATIGUE: 0
SORE THROAT: 0
BRUISES/BLEEDS EASILY: 0
BLOOD IN STOOL: 0
FEVER: 0
SINUS PAIN: 0
ABDOMINAL PAIN: 1
DIFFICULTY URINATING: 0
DIZZINESS: 0
WHEEZING: 0
DIARRHEA: 0
HEADACHES: 0
ARTHRALGIAS: 0
UNEXPECTED WEIGHT CHANGE: 0

## 2024-09-19 ASSESSMENT — HEART SCORE
RISK FACTORS: >2 RISK FACTORS OR HX OF ATHEROSCLEROTIC DISEASE
HISTORY: SLIGHTLY SUSPICIOUS
HEART SCORE: 5
AGE: 65+
TROPONIN: 1-3 TIMES NORMAL LIMIT
ECG: NORMAL

## 2024-09-25 ENCOUNTER — HOSPITAL ENCOUNTER (OUTPATIENT)
Dept: CARDIOLOGY | Facility: HOSPITAL | Age: 69
Discharge: HOME | End: 2024-09-25
Payer: MEDICARE

## 2024-09-25 LAB
ATRIAL RATE: 77 BPM
P AXIS: 49 DEGREES
P OFFSET: 173 MS
P ONSET: 116 MS
PR INTERVAL: 192 MS
Q ONSET: 212 MS
QRS COUNT: 13 BEATS
QRS DURATION: 78 MS
QT INTERVAL: 388 MS
QTC CALCULATION(BAZETT): 439 MS
QTC FREDERICIA: 421 MS
R AXIS: -52 DEGREES
T AXIS: 78 DEGREES
T OFFSET: 406 MS
VENTRICULAR RATE: 77 BPM

## 2024-09-25 PROCEDURE — 93005 ELECTROCARDIOGRAM TRACING: CPT

## 2024-12-13 ENCOUNTER — APPOINTMENT (OUTPATIENT)
Dept: RADIOLOGY | Facility: HOSPITAL | Age: 69
End: 2024-12-13
Payer: MEDICARE

## 2024-12-19 ENCOUNTER — APPOINTMENT (OUTPATIENT)
Dept: PRIMARY CARE | Facility: CLINIC | Age: 69
End: 2024-12-19
Payer: MEDICARE

## 2025-01-16 DIAGNOSIS — E03.9 ACQUIRED HYPOTHYROIDISM: ICD-10-CM

## 2025-01-16 RX ORDER — LEVOTHYROXINE SODIUM 100 UG/1
100 TABLET ORAL DAILY
Qty: 90 TABLET | Refills: 3 | Status: SHIPPED | OUTPATIENT
Start: 2025-01-16

## 2025-01-20 ENCOUNTER — APPOINTMENT (OUTPATIENT)
Dept: PRIMARY CARE | Facility: CLINIC | Age: 70
End: 2025-01-20
Payer: MEDICARE

## 2025-01-20 VITALS
BODY MASS INDEX: 23.36 KG/M2 | HEART RATE: 72 BPM | WEIGHT: 158.2 LBS | OXYGEN SATURATION: 99 % | DIASTOLIC BLOOD PRESSURE: 90 MMHG | TEMPERATURE: 97.1 F | SYSTOLIC BLOOD PRESSURE: 130 MMHG

## 2025-01-20 DIAGNOSIS — I10 HYPERTENSION, UNSPECIFIED TYPE: ICD-10-CM

## 2025-01-20 DIAGNOSIS — E78.00 HYPERCHOLESTEREMIA: ICD-10-CM

## 2025-01-20 DIAGNOSIS — I25.10 CORONARY ARTERY DISEASE INVOLVING NATIVE HEART, UNSPECIFIED VESSEL OR LESION TYPE, UNSPECIFIED WHETHER ANGINA PRESENT: ICD-10-CM

## 2025-01-20 DIAGNOSIS — L40.50 PSORIATIC ARTHROPATHY (MULTI): ICD-10-CM

## 2025-01-20 DIAGNOSIS — E55.9 VITAMIN D DEFICIENCY, UNSPECIFIED: ICD-10-CM

## 2025-01-20 DIAGNOSIS — D69.1 ABNORMAL PLATELETS (MULTI): ICD-10-CM

## 2025-01-20 DIAGNOSIS — I50.9 HEART FAILURE, UNSPECIFIED HF CHRONICITY, UNSPECIFIED HEART FAILURE TYPE: ICD-10-CM

## 2025-01-20 DIAGNOSIS — E03.9 HYPOTHYROIDISM, UNSPECIFIED TYPE: Primary | ICD-10-CM

## 2025-01-20 PROCEDURE — 1036F TOBACCO NON-USER: CPT | Performed by: INTERNAL MEDICINE

## 2025-01-20 PROCEDURE — 99214 OFFICE O/P EST MOD 30 MIN: CPT | Performed by: INTERNAL MEDICINE

## 2025-01-20 PROCEDURE — G2211 COMPLEX E/M VISIT ADD ON: HCPCS | Performed by: INTERNAL MEDICINE

## 2025-01-20 PROCEDURE — 1159F MED LIST DOCD IN RCRD: CPT | Performed by: INTERNAL MEDICINE

## 2025-01-20 PROCEDURE — 1160F RVW MEDS BY RX/DR IN RCRD: CPT | Performed by: INTERNAL MEDICINE

## 2025-01-20 PROCEDURE — 3075F SYST BP GE 130 - 139MM HG: CPT | Performed by: INTERNAL MEDICINE

## 2025-01-20 PROCEDURE — 3080F DIAST BP >= 90 MM HG: CPT | Performed by: INTERNAL MEDICINE

## 2025-01-20 ASSESSMENT — ENCOUNTER SYMPTOMS
FEVER: 0
DIFFICULTY URINATING: 0
FATIGUE: 0
HEADACHES: 0
BLOOD IN STOOL: 0
DIZZINESS: 0
COUGH: 0
PALPITATIONS: 0
BRUISES/BLEEDS EASILY: 0
SINUS PAIN: 0
ARTHRALGIAS: 0
UNEXPECTED WEIGHT CHANGE: 0
HYPERTENSION: 1
ABDOMINAL PAIN: 0
WHEEZING: 0
DIARRHEA: 0
SORE THROAT: 0

## 2025-01-20 ASSESSMENT — PATIENT HEALTH QUESTIONNAIRE - PHQ9
SUM OF ALL RESPONSES TO PHQ9 QUESTIONS 1 AND 2: 1
1. LITTLE INTEREST OR PLEASURE IN DOING THINGS: NOT AT ALL
10. IF YOU CHECKED OFF ANY PROBLEMS, HOW DIFFICULT HAVE THESE PROBLEMS MADE IT FOR YOU TO DO YOUR WORK, TAKE CARE OF THINGS AT HOME, OR GET ALONG WITH OTHER PEOPLE: NOT DIFFICULT AT ALL
2. FEELING DOWN, DEPRESSED OR HOPELESS: SEVERAL DAYS

## 2025-01-20 NOTE — PROGRESS NOTES
Subjective   Patient ID: Aamir Grossman is a 69 y.o. male who presents for Hypothyroidism, Hyperlipidemia, and Hypertension.    - Status post non-STEMI doing well no chest pain or shortness of breath patient started to avoid any NSAIDs in the future  Continue low-fat diet continue with weight loss recent blood work reviewed and up-to-date  .  Coronary artery disease status postcardiac catheterization results reviewed no significant disease continue with aspirin daily no chest pain no shortness of breath  - Hypercholesterolemia continues atorvastatin 80 mg daily follow-up lipid profile in 3 months  -Psoriasis patient need to restart on Taltz as recommended symptoms worsening upper extremity continue monitor conservatively until injection starts again  -Reflux disease continue omeprazole 40 mg daily  -Screening for colon cancer obtained obtained in February 2020 4 repeat in 5 years 2029  --Hypothyroid compensated follow-up thyroid function continues current medication  -BPH compensated  - Abnormal platelets compensated no signs of bleeding follow-up CBC  Follow-up for routine physical exam as scheduled and Medicare physical         Hyperlipidemia  Pertinent negatives include no chest pain.   Hypertension  Pertinent negatives include no chest pain, headaches or palpitations.          Review of Systems   Constitutional:  Negative for fatigue, fever and unexpected weight change.   HENT:  Negative for congestion, ear discharge, ear pain, mouth sores, sinus pain and sore throat.    Eyes:  Negative for visual disturbance.   Respiratory:  Negative for cough and wheezing.    Cardiovascular:  Negative for chest pain, palpitations and leg swelling.   Gastrointestinal:  Negative for abdominal pain, blood in stool and diarrhea.   Genitourinary:  Negative for difficulty urinating.   Musculoskeletal:  Negative for arthralgias.   Skin:  Negative for rash.   Neurological:  Negative for dizziness and headaches.   Hematological:  Does  "not bruise/bleed easily.   Psychiatric/Behavioral:  Negative for behavioral problems.    All other systems reviewed and are negative.      Objective   No results found for: \"HGBA1C\"   /90   Pulse 72   Temp 36.2 °C (97.1 °F)   Wt 71.8 kg (158 lb 3.2 oz)   SpO2 99%   BMI 23.36 kg/m²   Lab Results   Component Value Date    WBC 7.6 09/18/2024    HGB 12.5 (L) 09/18/2024    HCT 36.0 (L) 09/18/2024    PLT 99 (L) 09/18/2024    CHOL 164 06/16/2023    TRIG 211 (H) 06/16/2023    HDL 66.9 06/16/2023    ALT 28 09/18/2024    AST 33 09/18/2024     09/18/2024    K 4.5 09/18/2024     09/18/2024    CREATININE 1.22 09/18/2024    BUN 38 (H) 09/18/2024    CO2 25 09/18/2024    TSH 3.06 10/25/2022    INR 1.0 09/18/2024     par   Physical Exam  Vitals and nursing note reviewed.   Constitutional:       Appearance: Normal appearance.   HENT:      Head: Normocephalic.      Nose: Nose normal.   Eyes:      Conjunctiva/sclera: Conjunctivae normal.      Pupils: Pupils are equal, round, and reactive to light.   Cardiovascular:      Rate and Rhythm: Regular rhythm.   Pulmonary:      Effort: Pulmonary effort is normal.      Breath sounds: Normal breath sounds.   Abdominal:      General: Abdomen is flat.      Palpations: Abdomen is soft.   Musculoskeletal:      Cervical back: Neck supple.   Skin:     General: Skin is warm.   Neurological:      General: No focal deficit present.      Mental Status: He is oriented to person, place, and time.   Psychiatric:         Mood and Affect: Mood normal.         Assessment/Plan   Aamir was seen today for hypothyroidism, hyperlipidemia and hypertension.  Diagnoses and all orders for this visit:  Hypothyroidism, unspecified type (Primary)  Psoriatic arthropathy (Multi)  Abnormal platelets (Multi)  Heart failure, unspecified HF chronicity, unspecified heart failure type  Hypertension, unspecified type  Hypercholesteremia  Vitamin D deficiency, unspecified  Coronary artery disease involving " native heart, unspecified vessel or lesion type, unspecified whether angina present  Other orders  -     Follow Up In Primary Care - Medicare Annual; Future   - Status post non-STEMI doing well no chest pain or shortness of breath patient started to avoid any NSAIDs in the future  Continue low-fat diet continue with weight loss recent blood work reviewed and up-to-date  .  Coronary artery disease status postcardiac catheterization results reviewed no significant disease continue with aspirin daily no chest pain no shortness of breath  - Hypercholesterolemia continues atorvastatin 80 mg daily follow-up lipid profile in 3 months  -Psoriasis patient need to restart on Taltz as recommended symptoms worsening upper extremity continue monitor conservatively until injection starts again  -Reflux disease continue omeprazole 40 mg daily  -Screening for colon cancer obtained obtained in February 2020 4 repeat in 5 years 2029  --Hypothyroid compensated follow-up thyroid function continues current medication  -BPH compensated  - Abnormal platelets compensated no signs of bleeding follow-up CBC  Follow-up for routine physical exam as scheduled and Medicare physical

## 2025-03-13 DIAGNOSIS — F32.5 DEPRESSION, MAJOR, IN REMISSION (CMS-HCC): ICD-10-CM

## 2025-03-13 RX ORDER — CITALOPRAM 20 MG/1
20 TABLET, FILM COATED ORAL DAILY
Qty: 30 TABLET | Refills: 1 | Status: SHIPPED | OUTPATIENT
Start: 2025-03-13 | End: 2025-05-12

## 2025-03-18 ENCOUNTER — APPOINTMENT (OUTPATIENT)
Dept: RADIOLOGY | Facility: HOSPITAL | Age: 70
End: 2025-03-18
Payer: MEDICARE

## 2025-03-18 ENCOUNTER — HOSPITAL ENCOUNTER (EMERGENCY)
Facility: HOSPITAL | Age: 70
Discharge: LEFT BEFORE DISCHARGE | End: 2025-03-18
Payer: MEDICARE

## 2025-03-18 ENCOUNTER — OFFICE VISIT (OUTPATIENT)
Dept: PRIMARY CARE | Facility: CLINIC | Age: 70
End: 2025-03-18
Payer: MEDICARE

## 2025-03-18 ENCOUNTER — APPOINTMENT (OUTPATIENT)
Dept: CARDIOLOGY | Facility: HOSPITAL | Age: 70
End: 2025-03-18
Payer: MEDICARE

## 2025-03-18 VITALS
HEART RATE: 78 BPM | BODY MASS INDEX: 22.06 KG/M2 | DIASTOLIC BLOOD PRESSURE: 68 MMHG | TEMPERATURE: 97.4 F | SYSTOLIC BLOOD PRESSURE: 90 MMHG | WEIGHT: 149.4 LBS | OXYGEN SATURATION: 97 %

## 2025-03-18 VITALS
SYSTOLIC BLOOD PRESSURE: 82 MMHG | RESPIRATION RATE: 16 BRPM | BODY MASS INDEX: 24.91 KG/M2 | HEART RATE: 100 BPM | DIASTOLIC BLOOD PRESSURE: 50 MMHG | TEMPERATURE: 97 F | WEIGHT: 155 LBS | HEIGHT: 66 IN | OXYGEN SATURATION: 97 %

## 2025-03-18 DIAGNOSIS — R79.89 ELEVATED LFTS: ICD-10-CM

## 2025-03-18 DIAGNOSIS — I95.1 ORTHOSTATIC HYPOTENSION: ICD-10-CM

## 2025-03-18 DIAGNOSIS — E78.00 HYPERCHOLESTEREMIA: ICD-10-CM

## 2025-03-18 DIAGNOSIS — E55.9 VITAMIN D DEFICIENCY, UNSPECIFIED: ICD-10-CM

## 2025-03-18 DIAGNOSIS — R55 NEAR SYNCOPE: Primary | ICD-10-CM

## 2025-03-18 DIAGNOSIS — I10 HYPERTENSION, UNSPECIFIED TYPE: ICD-10-CM

## 2025-03-18 DIAGNOSIS — D50.9 IRON DEFICIENCY ANEMIA, UNSPECIFIED IRON DEFICIENCY ANEMIA TYPE: ICD-10-CM

## 2025-03-18 DIAGNOSIS — I25.10 CORONARY ARTERY DISEASE INVOLVING NATIVE HEART, UNSPECIFIED VESSEL OR LESION TYPE, UNSPECIFIED WHETHER ANGINA PRESENT: ICD-10-CM

## 2025-03-18 DIAGNOSIS — I95.1 ORTHOSTATIC HYPOTENSION: Primary | ICD-10-CM

## 2025-03-18 LAB
ALBUMIN SERPL BCP-MCNC: 4.6 G/DL (ref 3.4–5)
ALP SERPL-CCNC: 163 U/L (ref 33–136)
ALT SERPL W P-5'-P-CCNC: 84 U/L (ref 10–52)
ANION GAP SERPL CALC-SCNC: 18 MMOL/L (ref 10–20)
APTT PPP: 32 SECONDS (ref 26–36)
AST SERPL W P-5'-P-CCNC: 129 U/L (ref 9–39)
BASOPHILS # BLD AUTO: 0.03 X10*3/UL (ref 0–0.1)
BASOPHILS NFR BLD AUTO: 0.5 %
BILIRUB SERPL-MCNC: 1 MG/DL (ref 0–1.2)
BNP SERPL-MCNC: 38 PG/ML (ref 0–99)
BUN SERPL-MCNC: 20 MG/DL (ref 6–23)
CALCIUM SERPL-MCNC: 9.6 MG/DL (ref 8.6–10.3)
CARDIAC TROPONIN I PNL SERPL HS: 16 NG/L (ref 0–20)
CHLORIDE SERPL-SCNC: 100 MMOL/L (ref 98–107)
CO2 SERPL-SCNC: 25 MMOL/L (ref 21–32)
CREAT SERPL-MCNC: 0.97 MG/DL (ref 0.5–1.3)
D DIMER PPP FEU-MCNC: 648 NG/ML FEU
EGFRCR SERPLBLD CKD-EPI 2021: 83 ML/MIN/1.73M*2
EOSINOPHIL # BLD AUTO: 0.03 X10*3/UL (ref 0–0.4)
EOSINOPHIL NFR BLD AUTO: 0.5 %
ERYTHROCYTE [DISTWIDTH] IN BLOOD BY AUTOMATED COUNT: 11.2 % (ref 11.5–14.5)
FLUAV RNA RESP QL NAA+PROBE: NOT DETECTED
FLUBV RNA RESP QL NAA+PROBE: NOT DETECTED
GLUCOSE SERPL-MCNC: 78 MG/DL (ref 74–99)
HCT VFR BLD AUTO: 39.3 % (ref 41–52)
HGB BLD-MCNC: 13.9 G/DL (ref 13.5–17.5)
IMM GRANULOCYTES # BLD AUTO: 0.02 X10*3/UL (ref 0–0.5)
IMM GRANULOCYTES NFR BLD AUTO: 0.3 % (ref 0–0.9)
INR PPP: 1 (ref 0.9–1.1)
LYMPHOCYTES # BLD AUTO: 1.02 X10*3/UL (ref 0.8–3)
LYMPHOCYTES NFR BLD AUTO: 16.9 %
MAGNESIUM SERPL-MCNC: 1.64 MG/DL (ref 1.6–2.4)
MCH RBC QN AUTO: 37.8 PG (ref 26–34)
MCHC RBC AUTO-ENTMCNC: 35.4 G/DL (ref 32–36)
MCV RBC AUTO: 107 FL (ref 80–100)
MONOCYTES # BLD AUTO: 0.51 X10*3/UL (ref 0.05–0.8)
MONOCYTES NFR BLD AUTO: 8.5 %
NEUTROPHILS # BLD AUTO: 4.41 X10*3/UL (ref 1.6–5.5)
NEUTROPHILS NFR BLD AUTO: 73.3 %
NRBC BLD-RTO: 0 /100 WBCS (ref 0–0)
PLATELET # BLD AUTO: 97 X10*3/UL (ref 150–450)
POTASSIUM SERPL-SCNC: 4.5 MMOL/L (ref 3.5–5.3)
PROT SERPL-MCNC: 7.6 G/DL (ref 6.4–8.2)
PROTHROMBIN TIME: 10.6 SECONDS (ref 9.8–12.4)
RBC # BLD AUTO: 3.68 X10*6/UL (ref 4.5–5.9)
RSV RNA RESP QL NAA+PROBE: NOT DETECTED
SARS-COV-2 RNA RESP QL NAA+PROBE: NOT DETECTED
SODIUM SERPL-SCNC: 138 MMOL/L (ref 136–145)
T4 FREE SERPL-MCNC: 0.97 NG/DL (ref 0.61–1.12)
TSH SERPL-ACNC: 3.99 MIU/L (ref 0.44–3.98)
WBC # BLD AUTO: 6 X10*3/UL (ref 4.4–11.3)

## 2025-03-18 PROCEDURE — 93005 ELECTROCARDIOGRAM TRACING: CPT

## 2025-03-18 PROCEDURE — 99285 EMERGENCY DEPT VISIT HI MDM: CPT | Mod: 25

## 2025-03-18 PROCEDURE — 85610 PROTHROMBIN TIME: CPT

## 2025-03-18 PROCEDURE — 84443 ASSAY THYROID STIM HORMONE: CPT

## 2025-03-18 PROCEDURE — 36415 COLL VENOUS BLD VENIPUNCTURE: CPT

## 2025-03-18 PROCEDURE — 83880 ASSAY OF NATRIURETIC PEPTIDE: CPT

## 2025-03-18 PROCEDURE — 85730 THROMBOPLASTIN TIME PARTIAL: CPT

## 2025-03-18 PROCEDURE — 83735 ASSAY OF MAGNESIUM: CPT

## 2025-03-18 PROCEDURE — 71045 X-RAY EXAM CHEST 1 VIEW: CPT | Mod: FOREIGN READ | Performed by: RADIOLOGY

## 2025-03-18 PROCEDURE — 1159F MED LIST DOCD IN RCRD: CPT | Performed by: INTERNAL MEDICINE

## 2025-03-18 PROCEDURE — 3078F DIAST BP <80 MM HG: CPT | Performed by: INTERNAL MEDICINE

## 2025-03-18 PROCEDURE — 84075 ASSAY ALKALINE PHOSPHATASE: CPT

## 2025-03-18 PROCEDURE — 84439 ASSAY OF FREE THYROXINE: CPT

## 2025-03-18 PROCEDURE — 85025 COMPLETE CBC W/AUTO DIFF WBC: CPT

## 2025-03-18 PROCEDURE — 1036F TOBACCO NON-USER: CPT | Performed by: INTERNAL MEDICINE

## 2025-03-18 PROCEDURE — 99214 OFFICE O/P EST MOD 30 MIN: CPT | Performed by: INTERNAL MEDICINE

## 2025-03-18 PROCEDURE — 87637 SARSCOV2&INF A&B&RSV AMP PRB: CPT

## 2025-03-18 PROCEDURE — 93000 ELECTROCARDIOGRAM COMPLETE: CPT | Performed by: INTERNAL MEDICINE

## 2025-03-18 PROCEDURE — 85379 FIBRIN DEGRADATION QUANT: CPT

## 2025-03-18 PROCEDURE — 3074F SYST BP LT 130 MM HG: CPT | Performed by: INTERNAL MEDICINE

## 2025-03-18 PROCEDURE — 1160F RVW MEDS BY RX/DR IN RCRD: CPT | Performed by: INTERNAL MEDICINE

## 2025-03-18 PROCEDURE — 71045 X-RAY EXAM CHEST 1 VIEW: CPT

## 2025-03-18 PROCEDURE — 84484 ASSAY OF TROPONIN QUANT: CPT

## 2025-03-18 ASSESSMENT — ENCOUNTER SYMPTOMS
WHEEZING: 0
FEVER: 0
SORE THROAT: 0
SINUS PAIN: 0
BLOOD IN STOOL: 0
FATIGUE: 0
HEADACHES: 0
DIZZINESS: 1
BRUISES/BLEEDS EASILY: 0
COUGH: 0
UNEXPECTED WEIGHT CHANGE: 0
ABDOMINAL PAIN: 0
DIARRHEA: 0
PALPITATIONS: 0
ARTHRALGIAS: 0
DIFFICULTY URINATING: 0

## 2025-03-18 ASSESSMENT — COLUMBIA-SUICIDE SEVERITY RATING SCALE - C-SSRS
6. HAVE YOU EVER DONE ANYTHING, STARTED TO DO ANYTHING, OR PREPARED TO DO ANYTHING TO END YOUR LIFE?: NO
2. HAVE YOU ACTUALLY HAD ANY THOUGHTS OF KILLING YOURSELF?: NO
1. IN THE PAST MONTH, HAVE YOU WISHED YOU WERE DEAD OR WISHED YOU COULD GO TO SLEEP AND NOT WAKE UP?: NO

## 2025-03-18 ASSESSMENT — PAIN - FUNCTIONAL ASSESSMENT: PAIN_FUNCTIONAL_ASSESSMENT: 0-10

## 2025-03-18 ASSESSMENT — PAIN SCALES - GENERAL: PAINLEVEL_OUTOF10: 0 - NO PAIN

## 2025-03-18 ASSESSMENT — PAIN DESCRIPTION - PROGRESSION: CLINICAL_PROGRESSION: NOT CHANGED

## 2025-03-18 NOTE — ED PROVIDER NOTES
HPI   Chief Complaint   Patient presents with    Syncope     Several near syncopal episodes in the last month; pt states he gets very dizzy when he stands, almost passed out several times but didn't; no falls or injuries, no LOC; sent in by PCP       Patient is a 69-year-old male with significant PMH of CAD 1 cardiac stent on Plavix, hypertension, hypothyroid, history of an MI, hyperlipidemia presents to the ED for abnormal blood work from primary doctor.  Patient states he saw his primary doctor this morning who told him to go to the ER.  Patient is unsure what his blood work showed.  Patient has had symptoms of near syncope the past month every time he stands up.  Patient denies any changes in medication or new medication.  Patient states he still tolerating p.o. intake well.  Patient denies any melena hematochezia hematemesis.  Patient denies any fever chills congestion cough chest pain shortness of breath nausea vomiting abdominal pain diarrhea constipation changes with urination.  Patient denies any history of syncope in the past.  Patient states he has felt lightheaded today with standing.  Patient denies any chest pain or shortness of breath with the symptoms.  Patient denies any bruising.  Patient denies any tobacco alcohol or street drug abuse.  Follows with [facility] for cardiology.              Patient History   Past Medical History:   Diagnosis Date    Coronary artery disease     Encounter for immunization 09/20/2016    Influenza vaccination administered at current visit    Encounter for screening, unspecified 09/30/2015    Screening    GERD (gastroesophageal reflux disease)     Hypertension     Hypothyroidism     Myocardial infarction (Multi)     Other specified anxiety disorders     Depression with anxiety    Personal history of diseases of the skin and subcutaneous tissue     History of psoriasis    Personal history of nicotine dependence 10/20/2014    Former smoker, stopped smoking in distant past     Personal history of other diseases of the circulatory system 06/10/2016    History of hypotension    Personal history of other diseases of the circulatory system 2017    History of hypertension    Personal history of other diseases of the circulatory system     Personal history of congestive heart failure    Personal history of other diseases of the circulatory system     Personal history of coronary atherosclerosis    Personal history of other diseases of the respiratory system 2015    History of paranasal sinus congestion    Personal history of other endocrine, nutritional and metabolic disease     History of hypothyroidism    Pure hypercholesterolemia, unspecified 10/25/2022    Hypercholesterolemia    Tendinopathy of left rotator cuff 2023     Past Surgical History:   Procedure Laterality Date    CARDIAC CATHETERIZATION N/A 2024    Procedure: Left Heart Cath, No LV;  Surgeon: Stephane Loredo MD;  Location: [facility] Cardiac Cath Lab;  Service: Cardiovascular;  Laterality: N/A;    CARDIAC SURGERY      CORONARY STENT PLACEMENT      OTHER SURGICAL HISTORY  2014    Previous Stent Placement    OTHER SURGICAL HISTORY  2014    Ear Surgery    ROTATOR CUFF REPAIR Left 2018    Rotator Cuff Repair    TONSILLECTOMY  2018    Tonsillectomy     Family History   Problem Relation Name Age of Onset    Hypertension Mother      Hyperlipidemia Mother      Osteoporosis Mother      Coronary artery disease Father       Social History     Tobacco Use    Smoking status: Former     Current packs/day: 0.00     Average packs/day: 1 pack/day for 20.0 years (20.0 ttl pk-yrs)     Types: Cigarettes, Cigars     Start date:      Quit date: 2000     Years since quittin.2    Smokeless tobacco: Never    Tobacco comments:     Smokes cigars occassionally   Vaping Use    Vaping status: Never Used   Substance Use Topics    Alcohol use: Yes     Comment: 1 x week    Drug use: Never       Physical  Exam   ED Triage Vitals [03/18/25 1538]   Temperature Heart Rate Respirations BP   36.4 °C (97.5 °F) 87 20 138/87      Pulse Ox Temp Source Heart Rate Source Patient Position   96 % Temporal -- --      BP Location FiO2 (%)     -- --       Physical Exam  HENT:      Head: Normocephalic.   Cardiovascular:      Rate and Rhythm: Normal rate and regular rhythm.      Pulses: Normal pulses.   Pulmonary:      Effort: Pulmonary effort is normal.      Breath sounds: No wheezing, rhonchi or rales.   Abdominal:      Palpations: Abdomen is soft.      Tenderness: There is no abdominal tenderness. There is no guarding or rebound.   Musculoskeletal:         General: Normal range of motion.      Right lower leg: No edema.      Left lower leg: No edema.   Neurological:      General: No focal deficit present.      Mental Status: He is alert and oriented to person, place, and time. Mental status is at baseline.           ED Course & MDM   ED Course as of 03/18/25 1648   Tue Mar 18, 2025   1550 EKG performed at 1539 normal sinus rhythm left axis deviation no acute signs of ischemia ventricular rate 71 bpm []      ED Course User Index  [] Lea Og PA-C         Diagnoses as of 03/18/25 1648   Near syncope   Elevated LFTs   Orthostatic hypotension                 No data recorded     [place] Coma Scale Score: 15 (03/18/25 1528 : Kortney Goldsmith RN)                           Medical Decision Making  Medical Decision Making:  Patient presented as described in HPI. Patient case including ROS, PE, and treatment and plan discussed with ED attending if attached as cosigner. Due to patients presentation orders completed include as documented.  Patient presents to the ED for near syncopal episodes x 1 month and abnormal labs from PCP today.  Patient is nontoxic-appearing no focal deficits lung sounds are clear abdomen is soft and nontender no peripheral edema.  Pending labs and imaging.  TSH is 3.99 pending reflex.  LFTs are slightly  elevated.  D-dimer was 648 with a age-adjusted is negative.  No leukocytosis.  Patient is not understanding why he has been sitting here and no one is doing anything. I told patient we are waiting for labs and imaging, ER visits typically take a couple hours and he has been here for less then an hour and half. Patient is wanting to leave AGAINST MEDICAL ADVICE.  Patient still has not gone for CT scan.  They are in the middle of doing orthostatics which are positive lying down 129/92 and 80 systolic with standing.  Patient educated on needing to stay patient does not wish to stay and wants to leave AMA.         Patient care discussed with: N/A  Social Determinants affecting care: N/A    Final diagnosis and disposition as below.  See CI       Disposition:  AMA      This note has been transcribed using voice recognition and may contain grammatical errors, misplaced words, incorrect words, incorrect phrases or other errors.        Labs Reviewed   CBC WITH AUTO DIFFERENTIAL - Abnormal       Result Value    WBC 6.0      nRBC 0.0      RBC 3.68 (*)     Hemoglobin 13.9      Hematocrit 39.3 (*)      (*)     MCH 37.8 (*)     MCHC 35.4      RDW 11.2 (*)     Platelets 97 (*)     Neutrophils % 73.3      Immature Granulocytes %, Automated 0.3      Lymphocytes % 16.9      Monocytes % 8.5      Eosinophils % 0.5      Basophils % 0.5      Neutrophils Absolute 4.41      Immature Granulocytes Absolute, Automated 0.02      Lymphocytes Absolute 1.02      Monocytes Absolute 0.51      Eosinophils Absolute 0.03      Basophils Absolute 0.03     COMPREHENSIVE METABOLIC PANEL - Abnormal    Glucose 78      Sodium 138      Potassium 4.5      Chloride 100      Bicarbonate 25      Anion Gap 18      Urea Nitrogen 20      Creatinine 0.97      eGFR 83      Calcium 9.6      Albumin 4.6      Alkaline Phosphatase 163 (*)     Total Protein 7.6       (*)     Bilirubin, Total 1.0      ALT 84 (*)    D-DIMER, VTE EXCLUSION - Abnormal    D-Dimer,  Quantitative VTE Exclusion 648 (*)     Narrative:     The VTE Exclusion D-Dimer assay is reported in ng/mL Fibrinogen Equivalent Units (FEU).    Per 's instructions for use, a value of less than 500 ng/mL (FEU) may help to exclude DVT or PE in outpatients when the assay is used with a clinical pretest probability assessment.(AEMR must utilize and document eCalc 'Wells Score Deep Vein Thrombosis Risk' for DVT exclusion only. Emergency Department should utilize  Guidelines for Emergency Department Use of the VTE Exclusion D-Dimer and Clinical Pretest probability assessment model for DVT or PE exclusion.)   TSH WITH REFLEX TO FREE T4 IF ABNORMAL - Abnormal    Thyroid Stimulating Hormone 3.99 (*)     Narrative:     TSH testing is performed using different testing methodology at [facility] than at other [facility]. Direct result comparisons should only be made within the same method.     MAGNESIUM - Normal    Magnesium 1.64     B-TYPE NATRIURETIC PEPTIDE - Normal    BNP 38      Narrative:        <100 pg/mL - Heart failure unlikely  100-299 pg/mL - Intermediate probability of acute heart                  failure exacerbation. Correlate with clinical                  context and patient history.    >=300 pg/mL - Heart Failure likely. Correlate with clinical                  context and patient history.    BNP testing is performed using different testing methodology at [facility] than at other [facility]. Direct result comparisons should only be made within the same method.      SARS-COV-2, INFLUENZA A/B AND RSV PCR - Normal    Coronavirus 2019, PCR Not Detected      Flu A Result Not Detected      Flu B Result Not Detected      RSV PCR Not Detected      Narrative:     This assay is an FDA-cleared, in vitro diagnostic nucleic acid amplification test for the qualitative detection and differentiation of SARS CoV-2/ Influenza A/B/ RSV from nasopharyngeal specimens collected  from individuals with signs and symptoms of respiratory tract infections, and has been validated for use at [facility]. Negative results do not preclude COVID-19/ Influenza A/B/ RSV infections and should not be used as the sole basis for diagnosis, treatment, or other management decisions. Testing for SARS CoV-2 is recommended only for patients who meet current clinical and/or epidemiological criteria defined by federal, state, or local public health directives.   PROTIME-INR - Normal    Protime 10.6      INR 1.0     APTT - Normal    aPTT 32      Narrative:     The APTT is no longer used for monitoring Unfractionated Heparin Therapy. For monitoring Heparin Therapy, use the Heparin Assay.   SERIAL TROPONIN-INITIAL - Normal    Troponin I, High Sensitivity 16      Narrative:     Less than 99th percentile of normal range cutoff-  Female and children under 18 years old <14 ng/L; Male <21 ng/L: Negative  Repeat testing should be performed if clinically indicated.     Female and children under 18 years old 14-50 ng/L; Male 21-50 ng/L:  Consistent with possible cardiac damage and possible increased clinical   risk. Serial measurements may help to assess extent of myocardial damage.     >50 ng/L: Consistent with cardiac damage, increased clinical risk and  myocardial infarction. Serial measurements may help assess extent of   myocardial damage.      NOTE: Children less than 1 year old may have higher baseline troponin   levels and results should be interpreted in conjunction with the overall   clinical context.     NOTE: Troponin I testing is performed using a different   testing methodology at [facility] than at other   [facility]. Direct result comparisons should only   be made within the same method.   TROPONIN SERIES- (INITIAL, 1 HR)    Narrative:     The following orders were created for panel order Troponin I Series, High Sensitivity (0, 1 HR).  Procedure                                Abnormality         Status                     ---------                               -----------         ------                     Troponin I, High Sensiti...[[id number]]  Normal              Final result               Troponin, High Sensitivi...[[id number]]                                                   Please view results for these tests on the individual orders.   URINALYSIS WITH REFLEX CULTURE AND MICROSCOPIC    Narrative:     The following orders were created for panel order Urinalysis with Reflex Culture and Microscopic.  Procedure                               Abnormality         Status                     ---------                               -----------         ------                     Urinalysis with Reflex C...[[id number]]                                                 Extra Urine Gray Tube[[id number]]                                                         Please view results for these tests on the individual orders.   URINALYSIS WITH REFLEX CULTURE AND MICROSCOPIC   EXTRA URINE GRAY TUBE   SERIAL TROPONIN, 1 HOUR   THYROXINE, FREE      CT head wo IV contrast    (Results Pending)   XR chest 1 view    (Results Pending)      Procedure  Procedures     Lea Og PA-C  03/18/25 1652       Lea Og PA-C  03/18/25 1704

## 2025-03-18 NOTE — PROGRESS NOTES
Subjective   Patient ID: Aamir Grossman is a 69 y.o. male who presents for Dizziness (Dizziness when stands from any position, symptoms x 6 weeks).    - Patient comes today to the office for dizziness when standing for the last 4 to 6 weeks worsening now no chest pain no shortness of breath  Patient consuming half a bottle of wine on daily basis  Patient counseled about orthostasis  EKG sinus rhythm  CBC and electrolyte reviewed within normal limits patient with severe orthostasis sent to the emergency room after evaluation in the emergency room patient declined stating and signed AMA  - Patient will need to discontinue lisinopril increase fluid intake counseled about alcohol abstinence  Follow-up cardiology for further eval recommendation-  -History of CAD non-STEMI recently seen by cardiology cardiac workup stable  Patient reassured monitor blood pressure closely in 4 weeks  - Hypercholesterolemia continues atorvastatin 80 mg daily follow-up lipid profile in 3 months  -Psoriasis continue on Taltz per dermatology recommendation  -Reflux disease continue omeprazole 40 mg daily  -Screening for colon cancer obtained obtained in February 2020 4 repeat in 5 years 2029  --Hypothyroid compensated follow-up thyroid function continues current medication  -BPH compensated  -Follow-up closely as needed  4 weeks    Dizziness  Pertinent negatives include no abdominal pain, arthralgias, chest pain, congestion, coughing, fatigue, fever, headaches, rash or sore throat.          Review of Systems   Constitutional:  Negative for fatigue, fever and unexpected weight change.   HENT:  Negative for congestion, ear discharge, ear pain, mouth sores, sinus pain and sore throat.    Eyes:  Negative for visual disturbance.   Respiratory:  Negative for cough and wheezing.    Cardiovascular:  Negative for chest pain, palpitations and leg swelling.   Gastrointestinal:  Negative for abdominal pain, blood in stool and diarrhea.   Genitourinary:   "Negative for difficulty urinating.   Musculoskeletal:  Negative for arthralgias.   Skin:  Negative for rash.   Neurological:  Positive for dizziness. Negative for headaches.   Hematological:  Does not bruise/bleed easily.   Psychiatric/Behavioral:  Negative for behavioral problems.    All other systems reviewed and are negative.      Objective   No results found for: \"HGBA1C\"   BP 90/68   Pulse 78   Temp 36.3 °C (97.4 °F)   Wt 67.8 kg (149 lb 6.4 oz)   SpO2 97%   BMI 22.06 kg/m²   Lab Results   Component Value Date    WBC 6.0 03/18/2025    HGB 13.9 03/18/2025    HCT 39.3 (L) 03/18/2025    PLT 97 (L) 03/18/2025    CHOL 164 06/16/2023    TRIG 211 (H) 06/16/2023    HDL 66.9 06/16/2023    ALT 84 (H) 03/18/2025     (H) 03/18/2025     03/18/2025    K 4.5 03/18/2025     03/18/2025    CREATININE 0.97 03/18/2025    BUN 20 03/18/2025    CO2 25 03/18/2025    TSH 3.99 (H) 03/18/2025    INR 1.0 03/18/2025     par   Physical Exam  Vitals and nursing note reviewed.   Constitutional:       Appearance: Normal appearance.   HENT:      Head: Normocephalic.      Nose: Nose normal.   Eyes:      Conjunctiva/sclera: Conjunctivae normal.      Pupils: Pupils are equal, round, and reactive to light.   Cardiovascular:      Rate and Rhythm: Regular rhythm.   Pulmonary:      Effort: Pulmonary effort is normal.      Breath sounds: Normal breath sounds.   Abdominal:      General: Abdomen is flat.      Palpations: Abdomen is soft.   Musculoskeletal:      Cervical back: Neck supple.   Skin:     General: Skin is warm.   Neurological:      General: No focal deficit present.      Mental Status: He is oriented to person, place, and time.   Psychiatric:         Mood and Affect: Mood normal.         Assessment/Plan   Aamir was seen today for dizziness.  Diagnoses and all orders for this visit:  Orthostatic hypotension (Primary)  -     CBC and Auto Differential; Future  -     Comprehensive Metabolic Panel; Future  -     Magnesium; " Future  -     ECG 12 lead (Clinic Performed)  Iron deficiency anemia, unspecified iron deficiency anemia type  -     Vitamin B12; Future  -     Iron and TIBC; Future  -     Folate; Future  Hypertension, unspecified type  Hypercholesteremia  Vitamin D deficiency, unspecified  Coronary artery disease involving native heart, unspecified vessel or lesion type, unspecified whether angina present   - Patient comes today to the office for dizziness when standing for the last 4 to 6 weeks worsening now no chest pain no shortness of breath  Patient consuming half a bottle of wine on daily basis  Patient counseled about orthostasis  EKG sinus rhythm  CBC and electrolyte reviewed within normal limits patient with severe orthostasis sent to the emergency room after evaluation in the emergency room patient declined stating and signed AMA  - Patient will need to discontinue lisinopril increase fluid intake counseled about alcohol abstinence  Follow-up cardiology for further eval recommendation-  -History of CAD non-STEMI recently seen by cardiology cardiac workup stable  Patient reassured monitor blood pressure closely in 4 weeks  - Hypercholesterolemia continues atorvastatin 80 mg daily follow-up lipid profile in 3 months  -Psoriasis continue on Taltz per dermatology recommendation  -Reflux disease continue omeprazole 40 mg daily  -Screening for colon cancer obtained obtained in February 2020 4 repeat in 5 years 2029  --Hypothyroid compensated follow-up thyroid function continues current medication  -BPH compensated  -Follow-up closely as needed  4 weeks

## 2025-03-19 ENCOUNTER — TELEPHONE (OUTPATIENT)
Dept: PRIMARY CARE | Facility: CLINIC | Age: 70
End: 2025-03-19
Payer: MEDICARE

## 2025-03-19 LAB
25(OH)D3+25(OH)D2 SERPL-MCNC: 9 NG/ML (ref 30–100)
ALBUMIN SERPL-MCNC: 4.6 G/DL (ref 3.6–5.1)
ALP SERPL-CCNC: 143 U/L (ref 35–144)
ALT SERPL-CCNC: 69 U/L (ref 9–46)
ANION GAP SERPL CALCULATED.4IONS-SCNC: 13 MMOL/L (CALC) (ref 7–17)
AST SERPL-CCNC: 101 U/L (ref 10–35)
ATRIAL RATE: 71 BPM
BASOPHILS # BLD AUTO: 21 CELLS/UL (ref 0–200)
BASOPHILS NFR BLD AUTO: 0.5 %
BILIRUB SERPL-MCNC: 0.8 MG/DL (ref 0.2–1.2)
BUN SERPL-MCNC: 19 MG/DL (ref 7–25)
CALCIUM SERPL-MCNC: 9.2 MG/DL (ref 8.6–10.3)
CHLORIDE SERPL-SCNC: 102 MMOL/L (ref 98–110)
CHOLEST SERPL-MCNC: 165 MG/DL
CHOLEST/HDLC SERPL: 1.8 (CALC)
CO2 SERPL-SCNC: 24 MMOL/L (ref 20–32)
CREAT SERPL-MCNC: 0.96 MG/DL (ref 0.7–1.28)
EGFRCR SERPLBLD CKD-EPI 2021: 85 ML/MIN/1.73M2
EOSINOPHIL # BLD AUTO: 71 CELLS/UL (ref 15–500)
EOSINOPHIL NFR BLD AUTO: 1.7 %
ERYTHROCYTE [DISTWIDTH] IN BLOOD BY AUTOMATED COUNT: 11.7 % (ref 11–15)
GLUCOSE SERPL-MCNC: 73 MG/DL (ref 65–139)
HCT VFR BLD AUTO: 41.2 % (ref 38.5–50)
HDLC SERPL-MCNC: 90 MG/DL
HGB BLD-MCNC: 14.2 G/DL (ref 13.2–17.1)
LDLC SERPL CALC-MCNC: 55 MG/DL (CALC)
LYMPHOCYTES # BLD AUTO: 874 CELLS/UL (ref 850–3900)
LYMPHOCYTES NFR BLD AUTO: 20.8 %
MCH RBC QN AUTO: 38.3 PG (ref 27–33)
MCHC RBC AUTO-ENTMCNC: 34.5 G/DL (ref 32–36)
MCV RBC AUTO: 111.1 FL (ref 80–100)
MONOCYTES # BLD AUTO: 399 CELLS/UL (ref 200–950)
MONOCYTES NFR BLD AUTO: 9.5 %
NEUTROPHILS # BLD AUTO: 2835 CELLS/UL (ref 1500–7800)
NEUTROPHILS NFR BLD AUTO: 67.5 %
NONHDLC SERPL-MCNC: 75 MG/DL (CALC)
P AXIS: 64 DEGREES
P OFFSET: 172 MS
P ONSET: 121 MS
PLATELET # BLD AUTO: 88 THOUSAND/UL (ref 140–400)
PMV BLD REES-ECKER: 11.5 FL (ref 7.5–12.5)
POTASSIUM SERPL-SCNC: 4.3 MMOL/L (ref 3.5–5.3)
PR INTERVAL: 186 MS
PROT SERPL-MCNC: 7 G/DL (ref 6.1–8.1)
Q ONSET: 214 MS
QRS COUNT: 12 BEATS
QRS DURATION: 70 MS
QT INTERVAL: 388 MS
QTC CALCULATION(BAZETT): 421 MS
QTC FREDERICIA: 410 MS
R AXIS: -60 DEGREES
RBC # BLD AUTO: 3.71 MILLION/UL (ref 4.2–5.8)
SERVICE CMNT-IMP: ABNORMAL
SODIUM SERPL-SCNC: 139 MMOL/L (ref 135–146)
T AXIS: 73 DEGREES
T OFFSET: 408 MS
TRIGL SERPL-MCNC: 123 MG/DL
TSH SERPL-ACNC: 4.36 MIU/L (ref 0.4–4.5)
VENTRICULAR RATE: 71 BPM
VIT B12 SERPL-MCNC: 539 PG/ML (ref 200–1100)
WBC # BLD AUTO: 4.2 THOUSAND/UL (ref 3.8–10.8)

## 2025-03-19 NOTE — TELEPHONE ENCOUNTER
Called patient to have him discontinue the lisinopril at this time and to follow up in 4 weeks.  Patient could not make appointment at the time of my call so said he would call back to set up that appointment.

## 2025-04-21 ENCOUNTER — TELEPHONE (OUTPATIENT)
Dept: PRIMARY CARE | Facility: CLINIC | Age: 70
End: 2025-04-21

## 2025-04-21 ENCOUNTER — APPOINTMENT (OUTPATIENT)
Dept: PRIMARY CARE | Facility: CLINIC | Age: 70
End: 2025-04-21
Payer: MEDICARE

## 2025-04-21 ENCOUNTER — LAB REQUISITION (OUTPATIENT)
Dept: LAB | Facility: HOSPITAL | Age: 70
End: 2025-04-21
Payer: MEDICARE

## 2025-04-21 VITALS
DIASTOLIC BLOOD PRESSURE: 82 MMHG | BODY MASS INDEX: 22.76 KG/M2 | HEIGHT: 68 IN | HEART RATE: 85 BPM | TEMPERATURE: 97.5 F | WEIGHT: 150.2 LBS | SYSTOLIC BLOOD PRESSURE: 110 MMHG | OXYGEN SATURATION: 97 %

## 2025-04-21 DIAGNOSIS — E78.00 HYPERCHOLESTEREMIA: ICD-10-CM

## 2025-04-21 DIAGNOSIS — I21.4 NSTEMI (NON-ST ELEVATED MYOCARDIAL INFARCTION) (MULTI): ICD-10-CM

## 2025-04-21 DIAGNOSIS — R74.01 HIGH LIVER TRANSAMINASE LEVEL: ICD-10-CM

## 2025-04-21 DIAGNOSIS — I25.10 CORONARY ARTERY DISEASE INVOLVING NATIVE HEART, UNSPECIFIED VESSEL OR LESION TYPE, UNSPECIFIED WHETHER ANGINA PRESENT: ICD-10-CM

## 2025-04-21 DIAGNOSIS — I10 HYPERTENSION, UNSPECIFIED TYPE: ICD-10-CM

## 2025-04-21 DIAGNOSIS — D69.1 ABNORMAL PLATELETS (MULTI): ICD-10-CM

## 2025-04-21 DIAGNOSIS — R63.0 LOSS OF APPETITE: ICD-10-CM

## 2025-04-21 DIAGNOSIS — I25.10 CAD (CORONARY ARTERY DISEASE): ICD-10-CM

## 2025-04-21 DIAGNOSIS — R13.19 ESOPHAGEAL DYSPHAGIA: ICD-10-CM

## 2025-04-21 DIAGNOSIS — R05.9 COUGH, UNSPECIFIED TYPE: ICD-10-CM

## 2025-04-21 DIAGNOSIS — F10.920 ALCOHOLIC INTOXICATION WITHOUT COMPLICATION: ICD-10-CM

## 2025-04-21 DIAGNOSIS — Z00.00 ROUTINE GENERAL MEDICAL EXAMINATION AT HEALTH CARE FACILITY: Primary | ICD-10-CM

## 2025-04-21 DIAGNOSIS — R74.01 ELEVATION OF LEVELS OF LIVER TRANSAMINASE LEVELS: ICD-10-CM

## 2025-04-21 DIAGNOSIS — E03.9 ACQUIRED HYPOTHYROIDISM: ICD-10-CM

## 2025-04-21 LAB — ETHANOL SERPL-MCNC: 74 MG/DL

## 2025-04-21 PROCEDURE — 3074F SYST BP LT 130 MM HG: CPT | Performed by: INTERNAL MEDICINE

## 2025-04-21 PROCEDURE — 3079F DIAST BP 80-89 MM HG: CPT | Performed by: INTERNAL MEDICINE

## 2025-04-21 PROCEDURE — 3008F BODY MASS INDEX DOCD: CPT | Performed by: INTERNAL MEDICINE

## 2025-04-21 PROCEDURE — 99214 OFFICE O/P EST MOD 30 MIN: CPT | Performed by: INTERNAL MEDICINE

## 2025-04-21 PROCEDURE — 1124F ACP DISCUSS-NO DSCNMKR DOCD: CPT | Performed by: INTERNAL MEDICINE

## 2025-04-21 PROCEDURE — 1036F TOBACCO NON-USER: CPT | Performed by: INTERNAL MEDICINE

## 2025-04-21 PROCEDURE — 82077 ASSAY SPEC XCP UR&BREATH IA: CPT

## 2025-04-21 PROCEDURE — G0439 PPPS, SUBSEQ VISIT: HCPCS | Performed by: INTERNAL MEDICINE

## 2025-04-21 PROCEDURE — 1159F MED LIST DOCD IN RCRD: CPT | Performed by: INTERNAL MEDICINE

## 2025-04-21 PROCEDURE — 1160F RVW MEDS BY RX/DR IN RCRD: CPT | Performed by: INTERNAL MEDICINE

## 2025-04-21 PROCEDURE — 1170F FXNL STATUS ASSESSED: CPT | Performed by: INTERNAL MEDICINE

## 2025-04-21 RX ORDER — PNEUMOCOCCAL 20-VALENT CONJUGATE VACCINE 2.2; 2.2; 2.2; 2.2; 2.2; 2.2; 2.2; 2.2; 2.2; 2.2; 2.2; 2.2; 2.2; 2.2; 2.2; 2.2; 4.4; 2.2; 2.2; 2.2 UG/.5ML; UG/.5ML; UG/.5ML; UG/.5ML; UG/.5ML; UG/.5ML; UG/.5ML; UG/.5ML; UG/.5ML; UG/.5ML; UG/.5ML; UG/.5ML; UG/.5ML; UG/.5ML; UG/.5ML; UG/.5ML; UG/.5ML; UG/.5ML; UG/.5ML; UG/.5ML
0.5 INJECTION, SUSPENSION INTRAMUSCULAR ONCE
Qty: 0.5 ML | Refills: 0 | Status: SHIPPED | OUTPATIENT
Start: 2025-04-21 | End: 2025-04-21

## 2025-04-21 RX ORDER — ATORVASTATIN CALCIUM 80 MG/1
80 TABLET, FILM COATED ORAL NIGHTLY
Qty: 30 TABLET | Refills: 2 | Status: SHIPPED | OUTPATIENT
Start: 2025-04-21 | End: 2025-07-20

## 2025-04-21 RX ORDER — LEVOTHYROXINE SODIUM 112 UG/1
112 TABLET ORAL DAILY
Qty: 90 TABLET | Refills: 1 | Status: SHIPPED | OUTPATIENT
Start: 2025-04-21

## 2025-04-21 ASSESSMENT — ACTIVITIES OF DAILY LIVING (ADL)
DOING_HOUSEWORK: INDEPENDENT
MANAGING_FINANCES: INDEPENDENT
DRESSING: INDEPENDENT
TAKING_MEDICATION: INDEPENDENT
GROCERY_SHOPPING: INDEPENDENT
BATHING: INDEPENDENT

## 2025-04-21 ASSESSMENT — ENCOUNTER SYMPTOMS
DIARRHEA: 0
ALTERED MENTAL STATUS: 1
DIZZINESS: 0
HEADACHES: 0
BLOOD IN STOOL: 0
WHEEZING: 0
ABDOMINAL PAIN: 0
PALPITATIONS: 0
DIFFICULTY URINATING: 0
UNEXPECTED WEIGHT CHANGE: 0
FEVER: 0
BRUISES/BLEEDS EASILY: 0
FATIGUE: 0
ARTHRALGIAS: 0
SINUS PAIN: 0
SORE THROAT: 0
COUGH: 0

## 2025-04-21 ASSESSMENT — PATIENT HEALTH QUESTIONNAIRE - PHQ9
2. FEELING DOWN, DEPRESSED OR HOPELESS: NOT AT ALL
1. LITTLE INTEREST OR PLEASURE IN DOING THINGS: NOT AT ALL
SUM OF ALL RESPONSES TO PHQ9 QUESTIONS 1 AND 2: 0

## 2025-04-21 NOTE — PROGRESS NOTES
"Subjective   Patient ID: Aamir Grossman is a 69 y.o. male who presents for Medicare Annual Wellness Visit Subsequent, Results (Bw), Anorexia (Loss of appetite, weight loss ), and Altered Mental Status (Confusion, forgetting things , daughter and wife feels gets agitated , color seems bad, worried about driving ).    HPI       Review of Systems    Objective   No results found for: \"HGBA1C\"   /82   Pulse 85   Temp 36.4 °C (97.5 °F)   Ht 1.727 m (5' 8\")   Wt 68.1 kg (150 lb 3.2 oz)   SpO2 97%   BMI 22.84 kg/m²     Physical Exam    Assessment/Plan   Aamir was seen today for medicare annual wellness visit subsequent, results, anorexia and altered mental status.  Diagnoses and all orders for this visit:  NSTEMI (non-ST elevated myocardial infarction) (Multi)  -     atorvastatin (Lipitor) 80 mg tablet; Take 1 tablet (80 mg) by mouth once daily at bedtime.     "

## 2025-04-21 NOTE — PROGRESS NOTES
Subjective   Reason for Visit: Aamir Grossman is an 69 y.o. male here for a Medicare Wellness visit.     Past Medical, Surgical, and Family History reviewed and updated in chart.    Reviewed all medications by prescribing practitioner or clinical pharmacist (such as prescriptions, OTCs, herbal therapies and supplements) and documented in the medical record.    Medicare Annual Wellness Visit Subsequent, Results (Bw), Anorexia (Loss of appetite, weight loss ), and Altered Mental Status (Confusion, forgetting things , daughter and wife feels gets agitated , color seems bad, worried about driving )    Annual preventive visit  - Vaccinations reviewed need to proceed with pneumonia vaccine booster dose  -Colonoscopy screening for colon cancer obtained in 2024  - Screen for depression negative  - Advance directive reviewed    Follow-up  - Abnormal blood work hide liver enzymes patient intoxicated high alcohol level today refer patient to lab test for ethanol level  Patient counseled about alcohol abstinence  High liver enzymes may have underlying liver cirrhosis obtain abdominal ultrasound  - Weight loss and loss of appetite refer patient gastroenterology  -Cough patient smokes cigars on daily basis obtain CT chest follow-up results closely  -Patient family daughter called the office and reported concerned about his being forgetful confusion agitation and the risk for driving  Discussed with patient if we have the permission to talk to his daughter Selma patient signed paper today and consent to discuss or answer any questions from his daughter will follow-up closely  -History of CAD non-STEMI recently seen by cardiology cardiac workup stable  Patient reassured monitor blood pressure closely in 4 weeks  - Hypercholesterolemia continues atorvastatin 80 mg daily follow-up lipid profile in 3 months  -Psoriasis continue on Taltz per dermatology recommendation  -Reflux disease continue omeprazole 40 mg daily  -Screening for  "colon cancer obtained obtained in February 2020 4 repeat in 5 years 2029  --Hypothyroid compensated follow-up thyroid function continues current medication  -BPH compensated  -Follow-up closely as needed follow-up 4 weeks      Altered Mental Status  Associated symptoms: no abdominal pain, no fever, no headaches, no palpitations and no rash        Patient Care Team:  Lenora Cobb MD as PCP - General  Lenora Cobb MD [facility] P[facility]     Review of Systems   Constitutional:  Negative for fatigue, fever and unexpected weight change.   HENT:  Negative for congestion, ear discharge, ear pain, mouth sores, sinus pain and sore throat.    Eyes:  Negative for visual disturbance.   Respiratory:  Negative for cough and wheezing.    Cardiovascular:  Negative for chest pain, palpitations and leg swelling.   Gastrointestinal:  Negative for abdominal pain, blood in stool and diarrhea.   Genitourinary:  Negative for difficulty urinating.   Musculoskeletal:  Negative for arthralgias.   Skin:  Negative for rash.   Neurological:  Negative for dizziness and headaches.   Hematological:  Does not bruise/bleed easily.   Psychiatric/Behavioral:  Negative for behavioral problems.    All other systems reviewed and are negative.      Objective   Vitals:  /82   Pulse 85   Temp 36.4 °C (97.5 °F)   Ht 1.727 m (5' 8\")   Wt 68.1 kg (150 lb 3.2 oz)   SpO2 97%   BMI 22.84 kg/m²       Physical Exam  Vitals and nursing note reviewed.   Constitutional:       Appearance: Normal appearance. He is ill-appearing.      Comments: Mild alcohol intoxication   HENT:      Head: Normocephalic.      Nose: Nose normal.   Eyes:      Conjunctiva/sclera: Conjunctivae normal.      Pupils: Pupils are equal, round, and reactive to light.   Cardiovascular:      Rate and Rhythm: Regular rhythm.   Pulmonary:      Effort: Pulmonary effort is normal.      Breath sounds: Normal breath sounds.   Abdominal:      General: Abdomen is flat. "      Palpations: Abdomen is soft.   Musculoskeletal:      Cervical back: Neck supple.   Skin:     General: Skin is warm.   Neurological:      General: No focal deficit present.      Mental Status: He is oriented to person, place, and time.   Psychiatric:         Mood and Affect: Mood normal.       Lab Results   Component Value Date    WBC 6.0 03/18/2025    HGB 13.9 03/18/2025    HCT 39.3 (L) 03/18/2025    PLT 97 (L) 03/18/2025    CHOL 165 03/18/2025    TRIG 123 03/18/2025    HDL 90 03/18/2025    ALT 84 (H) 03/18/2025     (H) 03/18/2025     03/18/2025    K 4.5 03/18/2025     03/18/2025    CREATININE 0.97 03/18/2025    BUN 20 03/18/2025    CO2 25 03/18/2025    TSH 3.99 (H) 03/18/2025    INR 1.0 03/18/2025     par   Assessment & Plan  Alcoholic intoxication without complication         NSTEMI (non-ST elevated myocardial infarction) (Multi)    Orders:    atorvastatin (Lipitor) 80 mg tablet; Take 1 tablet (80 mg) by mouth once daily at bedtime.    Routine general medical examination at health care facility    Orders:    1 Year Follow Up In Primary Care - Wellness Exam; Future    pneumoc 20-oscar conj-dip cr,PF, (Prevnar 20, PF,) 0.5 mL vaccine; Inject 0.5 mL into the muscle 1 time for 1 dose.    Acquired hypothyroidism    Orders:    levothyroxine (Synthroid, Levoxyl) 112 mcg tablet; Take 1 tablet (112 mcg) by mouth once daily.    High liver transaminase level    Orders:    US abdomen limited liver; Future    Ethanol level; Future    Loss of appetite    Orders:    Referral to Gastroenterology; Future    Cough, unspecified type    Orders:    CT chest wo IV contrast; Future    Coronary artery disease involving native heart, unspecified vessel or lesion type, unspecified whether angina present         Hypercholesteremia         Hypertension, unspecified type         Abnormal platelets (Multi)         Esophageal dysphagia            Medicare Annual Wellness Visit Subsequent, Results (Bw), Anorexia (Loss of  appetite, weight loss ), and Altered Mental Status (Confusion, forgetting things , daughter and wife feels gets agitated , color seems bad, worried about driving )    Annual preventive visit  - Vaccinations reviewed need to proceed with pneumonia vaccine booster dose  -Colonoscopy screening for colon cancer obtained in 2024  - Screen for depression negative  - Advance directive reviewed    Follow-up  - Abnormal blood work hide liver enzymes patient intoxicated high alcohol level today refer patient to lab test for ethanol level  Patient counseled about alcohol abstinence  High liver enzymes may have underlying liver cirrhosis obtain abdominal ultrasound  - Weight loss and loss of appetite refer patient gastroenterology  -Cough patient smokes cigars on daily basis obtain CT chest follow-up results closely  - Abnormal thyroid function test need to increase levothyroxine to take 112 mcg daily check thyroid function test in 1 months  -Patient family daughter called the office and reported concerned about his being forgetful confusion agitation and the risk for driving  Discussed with patient if we have the permission to talk to his daughter Selma patient signed paper today and consent to discuss or answer any questions from his daughter will follow-up closely  -History of CAD non-STEMI recently seen by cardiology cardiac workup stable  Patient reassured monitor blood pressure closely in 4 weeks  - Hypercholesterolemia continues atorvastatin 80 mg daily follow-up lipid profile in 3 months  -Psoriasis continue on Taltz per dermatology recommendation  -Reflux disease continue omeprazole 40 mg daily  -Screening for colon cancer obtained obtained in February 2020 4 repeat in 5 years 2029  --Hypothyroid compensated follow-up thyroid function continues current medication  -BPH compensated  -Follow-up closely as needed follow-up 4 weeks

## 2025-04-21 NOTE — TELEPHONE ENCOUNTER
Pt's wife called- Would like an update on patients appointment.    Please advise if anything specific you would like me to share with family?

## 2025-04-23 RX ORDER — CLOPIDOGREL BISULFATE 75 MG/1
75 TABLET ORAL DAILY
Qty: 90 TABLET | Refills: 3 | Status: SHIPPED | OUTPATIENT
Start: 2025-04-23

## 2025-05-02 ENCOUNTER — HOSPITAL ENCOUNTER (OUTPATIENT)
Dept: RADIOLOGY | Facility: HOSPITAL | Age: 70
Discharge: HOME | End: 2025-05-02
Payer: MEDICARE

## 2025-05-02 DIAGNOSIS — R74.01 HIGH LIVER TRANSAMINASE LEVEL: ICD-10-CM

## 2025-05-02 DIAGNOSIS — R05.9 COUGH, UNSPECIFIED TYPE: ICD-10-CM

## 2025-05-02 PROCEDURE — 71250 CT THORAX DX C-: CPT

## 2025-05-02 PROCEDURE — 76705 ECHO EXAM OF ABDOMEN: CPT

## 2025-05-27 ENCOUNTER — APPOINTMENT (OUTPATIENT)
Dept: PRIMARY CARE | Facility: CLINIC | Age: 70
End: 2025-05-27
Payer: MEDICARE

## 2025-06-03 ENCOUNTER — APPOINTMENT (OUTPATIENT)
Dept: PRIMARY CARE | Facility: CLINIC | Age: 70
End: 2025-06-03
Payer: MEDICARE

## 2025-06-03 VITALS
TEMPERATURE: 96.7 F | DIASTOLIC BLOOD PRESSURE: 84 MMHG | OXYGEN SATURATION: 97 % | HEART RATE: 86 BPM | BODY MASS INDEX: 22.41 KG/M2 | SYSTOLIC BLOOD PRESSURE: 110 MMHG | WEIGHT: 147.4 LBS

## 2025-06-03 DIAGNOSIS — R53.83 OTHER FATIGUE: ICD-10-CM

## 2025-06-03 DIAGNOSIS — F10.27: Primary | ICD-10-CM

## 2025-06-03 DIAGNOSIS — R74.01 HIGH LIVER TRANSAMINASE LEVEL: ICD-10-CM

## 2025-06-03 DIAGNOSIS — F10.920 ALCOHOLIC INTOXICATION WITHOUT COMPLICATION: ICD-10-CM

## 2025-06-03 DIAGNOSIS — I25.10 CORONARY ARTERY DISEASE INVOLVING NATIVE HEART, UNSPECIFIED VESSEL OR LESION TYPE, UNSPECIFIED WHETHER ANGINA PRESENT: ICD-10-CM

## 2025-06-03 PROCEDURE — 3074F SYST BP LT 130 MM HG: CPT | Performed by: INTERNAL MEDICINE

## 2025-06-03 PROCEDURE — 1159F MED LIST DOCD IN RCRD: CPT | Performed by: INTERNAL MEDICINE

## 2025-06-03 PROCEDURE — 1160F RVW MEDS BY RX/DR IN RCRD: CPT | Performed by: INTERNAL MEDICINE

## 2025-06-03 PROCEDURE — G2211 COMPLEX E/M VISIT ADD ON: HCPCS | Performed by: INTERNAL MEDICINE

## 2025-06-03 PROCEDURE — 3079F DIAST BP 80-89 MM HG: CPT | Performed by: INTERNAL MEDICINE

## 2025-06-03 PROCEDURE — 1036F TOBACCO NON-USER: CPT | Performed by: INTERNAL MEDICINE

## 2025-06-03 PROCEDURE — 99214 OFFICE O/P EST MOD 30 MIN: CPT | Performed by: INTERNAL MEDICINE

## 2025-06-03 ASSESSMENT — ENCOUNTER SYMPTOMS
DIFFICULTY URINATING: 0
FEVER: 0
SINUS PAIN: 0
ABDOMINAL PAIN: 0
DIARRHEA: 0
WHEEZING: 0
BLOOD IN STOOL: 0
ARTHRALGIAS: 0
PALPITATIONS: 0
SORE THROAT: 0
COUGH: 0
HEADACHES: 0
DIZZINESS: 0
FATIGUE: 0
BRUISES/BLEEDS EASILY: 0
UNEXPECTED WEIGHT CHANGE: 0

## 2025-06-03 NOTE — PROGRESS NOTES
Subjective   Patient ID: Aamir Grossman is a 69 y.o. male who presents for Results (BW, CT, x-ray), Memory Loss (Memory issues, combatative), and Anxiety (Stopped taking Citalopram ).    - Follow-up labs  - Alcoholic dementia and worsening patient counseled about abstinence from driving  Patient counseled about alcohol rehab and to be admitted to [place] patient had that before planning to go this week  -Refer patient to neurology due to underlying agitation dementia for further recommendation  - Hypothyroid slowly improving follow-up l thyroid function test today for further recommendation  - Elevated liver enzymes due to alcohol counts about alcohol abstinence  -Noncalcified lung nodule obtained in May 2020 5 repeat in 1 year 2025  --Patient family daughter called the office and reported concerned about his being forgetful confusion agitation and the risk for driving  Discussed with patient if we have the permission to talk to his daughter Selma patient signed paper today and consent to discuss or answer any questions from his daughter will follow-up closely  -History of CAD non-STEMI recently seen by cardiology cardiac workup stable  Patient reassured monitor blood pressure closely in 4 weeks  - Hypercholesterolemia continues atorvastatin 80 mg daily follow-up lipid profile in 3 months  -Psoriasis continue on Taltz per dermatology recommendation  -Reflux disease continue omeprazole 40 mg daily  -Screening for colon cancer obtained obtained in February 2020 4 repeat in 5 years 2029  --Hypothyroid compensated follow-up thyroid function continues current medication  -BPH compensated  - Hospital security called to check in patient impairment and ability to drive family informed to stop driving due to alcohol impairment and dementia  Follow-up 3 months    Memory Loss      Anxiety  Patient reports no chest pain, dizziness or palpitations.              Review of Systems   Constitutional:  Negative for fatigue,  "fever and unexpected weight change.   HENT:  Negative for congestion, ear discharge, ear pain, mouth sores, sinus pain and sore throat.    Eyes:  Negative for visual disturbance.   Respiratory:  Negative for cough and wheezing.    Cardiovascular:  Negative for chest pain, palpitations and leg swelling.   Gastrointestinal:  Negative for abdominal pain, blood in stool and diarrhea.   Genitourinary:  Negative for difficulty urinating.   Musculoskeletal:  Negative for arthralgias.   Skin:  Negative for rash.   Neurological:  Negative for dizziness and headaches.   Hematological:  Does not bruise/bleed easily.   Psychiatric/Behavioral:  Negative for behavioral problems.    All other systems reviewed and are negative.      Objective   No results found for: \"HGBA1C\"   /84   Pulse 86   Temp 35.9 °C (96.7 °F)   Wt 66.9 kg (147 lb 6.4 oz)   SpO2 97%   BMI 22.41 kg/m²   Lab Results   Component Value Date    WBC 6.0 03/18/2025    HGB 13.9 03/18/2025    HCT 39.3 (L) 03/18/2025    PLT 97 (L) 03/18/2025    CHOL 165 03/18/2025    TRIG 123 03/18/2025    HDL 90 03/18/2025    ALT 84 (H) 03/18/2025     (H) 03/18/2025     03/18/2025    K 4.5 03/18/2025     03/18/2025    CREATININE 0.97 03/18/2025    BUN 20 03/18/2025    CO2 25 03/18/2025    TSH 3.99 (H) 03/18/2025    INR 1.0 03/18/2025     par   Physical Exam  Vitals and nursing note reviewed.   Constitutional:       Appearance: Normal appearance.   HENT:      Head: Normocephalic.      Nose: Nose normal.   Eyes:      Conjunctiva/sclera: Conjunctivae normal.      Pupils: Pupils are equal, round, and reactive to light.   Cardiovascular:      Rate and Rhythm: Regular rhythm.   Pulmonary:      Effort: Pulmonary effort is normal.      Breath sounds: Normal breath sounds.   Abdominal:      General: Abdomen is flat.      Palpations: Abdomen is soft.   Musculoskeletal:      Cervical back: Neck supple.   Skin:     General: Skin is warm.   Neurological:      General: " No focal deficit present.      Mental Status: He is oriented to person, place, and time.   Psychiatric:         Mood and Affect: Mood normal.         Assessment/Plan   Aamir was seen today for results, memory loss and anxiety.  Diagnoses and all orders for this visit:  Dementia associated with alcoholism, with mood disturbance, unspecified dementia severity (Primary)  -     Referral to Neurology; Future  Alcoholic intoxication without complication  Coronary artery disease involving native heart, unspecified vessel or lesion type, unspecified whether angina present  Other fatigue  -     TSH with reflex to Free T4 if abnormal; Future  -     TSH with reflex to Free T4 if abnormal  High liver transaminase level  Other orders  -     Follow Up In Primary Care - Established  -     Follow Up In Primary Care - Established; Future   - Follow-up labs  - Alcoholic dementia and worsening patient counseled about abstinence from driving  Patient counseled about alcohol rehab and to be admitted to [facility] patient had that before planning to go this week  -Refer patient to neurology due to underlying agitation dementia for further recommendation  - Hypothyroid slowly improving follow-up l thyroid function test today for further recommendation  - Elevated liver enzymes due to alcohol counts about alcohol abstinence  -Noncalcified lung nodule obtained in May 2020 5 repeat in 1 year 2025  --Patient family daughter called the office and reported concerned about his being forgetful confusion agitation and the risk for driving  Discussed with patient if we have the permission to talk to his daughter Selma patient signed paper today and consent to discuss or answer any questions from his daughter will follow-up closely  -History of CAD non-STEMI recently seen by cardiology cardiac workup stable  Patient reassured monitor blood pressure closely in 4 weeks  - Hypercholesterolemia continues atorvastatin 80 mg daily follow-up lipid  profile in 3 months  -Psoriasis continue on Taltz per dermatology recommendation  -Reflux disease continue omeprazole 40 mg daily  -Screening for colon cancer obtained obtained in February 2020 4 repeat in 5 years 2029  --Hypothyroid compensated follow-up thyroid function continues current medication  -BPH compensated  - Hospital security called to check in patient impairment and ability to drive family informed to stop driving due to alcohol impairment and dementia  Follow-up 3 months

## 2025-06-04 DIAGNOSIS — E03.9 ACQUIRED HYPOTHYROIDISM: ICD-10-CM

## 2025-06-04 LAB
PSA SERPL-MCNC: 0.97 NG/ML
T4 FREE SERPL-MCNC: 1.1 NG/DL (ref 0.8–1.8)
TSH SERPL-ACNC: 5.55 MIU/L (ref 0.4–4.5)

## 2025-06-04 RX ORDER — LEVOTHYROXINE SODIUM 125 UG/1
125 TABLET ORAL DAILY
Qty: 90 TABLET | Refills: 1 | Status: SHIPPED | OUTPATIENT
Start: 2025-06-04

## 2025-06-04 NOTE — RESULT ENCOUNTER NOTE
Please let patient know his thyroid function test came back hypoactive  He needs to increase levothyroxine to take now 125 mcg daily and repeat thyroid function test before next appointment order in the EMR and new prescription sent to the pharmacy

## 2025-06-07 LAB — TSH SERPL-ACNC: 4.28 MIU/L (ref 0.4–4.5)

## 2025-07-14 ENCOUNTER — OFFICE VISIT (OUTPATIENT)
Dept: URGENT CARE | Facility: URGENT CARE | Age: 70
End: 2025-07-14
Payer: MEDICARE

## 2025-07-14 VITALS
WEIGHT: 151.01 LBS | RESPIRATION RATE: 18 BRPM | DIASTOLIC BLOOD PRESSURE: 45 MMHG | OXYGEN SATURATION: 94 % | BODY MASS INDEX: 22.89 KG/M2 | HEIGHT: 68 IN | SYSTOLIC BLOOD PRESSURE: 66 MMHG | HEART RATE: 71 BPM

## 2025-07-14 DIAGNOSIS — M54.6 ACUTE BILATERAL THORACIC BACK PAIN: Primary | ICD-10-CM

## 2025-07-14 DIAGNOSIS — I95.9 HYPOTENSION, UNSPECIFIED HYPOTENSION TYPE: ICD-10-CM

## 2025-07-14 ASSESSMENT — ENCOUNTER SYMPTOMS: BACK PAIN: 1

## 2025-07-14 NOTE — PROGRESS NOTES
"Subjective   Patient ID: Aamir Grossman is a 69 y.o. male. They present today with a chief complaint of Back Pain (2 weeks, low back pain and left sided neck pain).    History of Present Illness    Back Pain        Past Medical History  Allergies as of 07/14/2025 - Reviewed 07/14/2025   Allergen Reaction Noted    Ibuprofen Other 09/18/2024       Prescriptions Prior to Admission[1]     Medical History[2]    Surgical History[3]     reports that he quit smoking about 25 years ago. His smoking use included cigarettes and cigars. He started smoking about 45 years ago. He has a 20 pack-year smoking history. He has never used smokeless tobacco. He reports current alcohol use. He reports that he does not use drugs.    Review of Systems  Review of Systems   Musculoskeletal:  Positive for back pain.                                  Objective    Vitals:    07/14/25 1801 07/14/25 1805 07/14/25 1807   BP: (!) 77/39 (!) 86/46 75/52   Pulse: 71     TempSrc: Oral     SpO2: 94%     Weight: 68.5 kg (151 lb 0.2 oz)     Height: 1.727 m (5' 8\")       No LMP for male patient.    Physical Exam  Vitals reviewed.   Constitutional:       Appearance: Normal appearance.   HENT:      Head: Normocephalic and atraumatic.   Cardiovascular:      Rate and Rhythm: Normal rate and regular rhythm.      Pulses: Normal pulses.      Heart sounds: Normal heart sounds.   Pulmonary:      Effort: Pulmonary effort is normal.      Breath sounds: Normal breath sounds.   Abdominal:      General: Abdomen is flat. Bowel sounds are normal.      Palpations: Abdomen is soft.   Musculoskeletal:         General: Normal range of motion.      Cervical back: Normal range of motion.   Skin:     General: Skin is warm.      Capillary Refill: Capillary refill takes less than 2 seconds.      Coloration: Skin is pale.   Neurological:      General: No focal deficit present.      Mental Status: He is alert and oriented to person, place, and time.   Psychiatric:         Mood and " Affect: Mood normal.         Behavior: Behavior normal.         Procedures    Point of Care Test & Imaging Results from this visit  No results found for this visit on 07/14/25.   Imaging  No results found.    Cardiology, Vascular, and Other Imaging  No other imaging results found for the past 2 days      Diagnostic study results (if any) were reviewed by [facility].    Assessment/Plan   Allergies, medications, history, and pertinent labs/EKGs/Imaging reviewed by BILL Small-CNP.     Medical Decision Making  Patient is 69 year old male with back pain 2 weeks, denies chest pain or shortness of breath or dizziness.  Patient blood pressure is low, he drove himself, he needs evaluated in ED for hypotension, possible labs, possible imaging.  EKG sinus rhythm. Do not see any ST elevations.  EMS called due to patient driving and low blood pressure readings, patient agree with plan of care, left with EMS in table condition.     Orders and Diagnoses  Diagnoses and all orders for this visit:  Acute bilateral thoracic back pain      Medical Admin Record      Patient disposition: ED    Electronically signed by [facility]  6:10 PM           [1] (Not in a hospital admission)  [2]   Past Medical History:  Diagnosis Date    Coronary artery disease     Encounter for immunization 09/20/2016    Influenza vaccination administered at current visit    Encounter for screening, unspecified 09/30/2015    Screening    GERD (gastroesophageal reflux disease)     Hypertension     Hypothyroidism     Myocardial infarction (Multi)     Other specified anxiety disorders     Depression with anxiety    Personal history of diseases of the skin and subcutaneous tissue     History of psoriasis    Personal history of nicotine dependence 10/20/2014    Former smoker, stopped smoking in distant past    Personal history of other diseases of the circulatory system 06/10/2016    History of hypotension    Personal history  of other diseases of the circulatory system 01/11/2017    History of hypertension    Personal history of other diseases of the circulatory system     Personal history of congestive heart failure    Personal history of other diseases of the circulatory system     Personal history of coronary atherosclerosis    Personal history of other diseases of the respiratory system 12/30/2015    History of paranasal sinus congestion    Personal history of other endocrine, nutritional and metabolic disease     History of hypothyroidism    Pure hypercholesterolemia, unspecified 10/25/2022    Hypercholesterolemia    Tendinopathy of left rotator cuff 06/13/2023   [3]   Past Surgical History:  Procedure Laterality Date    CARDIAC CATHETERIZATION N/A 4/29/2024    Procedure: Left Heart Cath, No LV;  Surgeon: Stephane Loredo MD;  Location: [facility] Cardiac Cath Lab;  Service: Cardiovascular;  Laterality: N/A;    CARDIAC SURGERY      CORONARY STENT PLACEMENT      OTHER SURGICAL HISTORY  01/21/2014    Previous Stent Placement    OTHER SURGICAL HISTORY  01/21/2014    Ear Surgery    ROTATOR CUFF REPAIR Left 05/29/2018    Rotator Cuff Repair    TONSILLECTOMY  05/29/2018    Tonsillectomy

## 2025-07-16 ENCOUNTER — OFFICE VISIT (OUTPATIENT)
Dept: PRIMARY CARE | Facility: CLINIC | Age: 70
End: 2025-07-16
Payer: MEDICARE

## 2025-07-16 VITALS
SYSTOLIC BLOOD PRESSURE: 110 MMHG | HEART RATE: 66 BPM | TEMPERATURE: 97.8 F | OXYGEN SATURATION: 98 % | BODY MASS INDEX: 23.05 KG/M2 | WEIGHT: 151.6 LBS | DIASTOLIC BLOOD PRESSURE: 70 MMHG

## 2025-07-16 DIAGNOSIS — R91.1 LUNG NODULE: ICD-10-CM

## 2025-07-16 DIAGNOSIS — I25.10 CORONARY ARTERY DISEASE INVOLVING NATIVE CORONARY ARTERY OF NATIVE HEART WITHOUT ANGINA PECTORIS: Primary | ICD-10-CM

## 2025-07-16 DIAGNOSIS — E55.9 VITAMIN D DEFICIENCY: ICD-10-CM

## 2025-07-16 DIAGNOSIS — K21.9 GASTROESOPHAGEAL REFLUX DISEASE WITHOUT ESOPHAGITIS: ICD-10-CM

## 2025-07-16 DIAGNOSIS — F01.50 VASCULAR DEMENTIA WITHOUT BEHAVIORAL DISTURBANCE, PSYCHOTIC DISTURBANCE, MOOD DISTURBANCE, OR ANXIETY, UNSPECIFIED DEMENTIA SEVERITY (MULTI): ICD-10-CM

## 2025-07-16 PROCEDURE — 1159F MED LIST DOCD IN RCRD: CPT | Performed by: INTERNAL MEDICINE

## 2025-07-16 PROCEDURE — 99214 OFFICE O/P EST MOD 30 MIN: CPT | Performed by: INTERNAL MEDICINE

## 2025-07-16 PROCEDURE — G2211 COMPLEX E/M VISIT ADD ON: HCPCS | Performed by: INTERNAL MEDICINE

## 2025-07-16 PROCEDURE — 3078F DIAST BP <80 MM HG: CPT | Performed by: INTERNAL MEDICINE

## 2025-07-16 PROCEDURE — 1160F RVW MEDS BY RX/DR IN RCRD: CPT | Performed by: INTERNAL MEDICINE

## 2025-07-16 PROCEDURE — 3074F SYST BP LT 130 MM HG: CPT | Performed by: INTERNAL MEDICINE

## 2025-07-16 RX ORDER — ASPIRIN 81 MG/1
81 TABLET ORAL DAILY
COMMUNITY

## 2025-07-16 RX ORDER — HYDROXYZINE PAMOATE 50 MG/1
50 CAPSULE ORAL 3 TIMES DAILY PRN
COMMUNITY

## 2025-07-16 RX ORDER — OMEPRAZOLE 20 MG/1
20 CAPSULE, DELAYED RELEASE ORAL
Qty: 90 CAPSULE | Refills: 1 | Status: SHIPPED | OUTPATIENT
Start: 2025-07-16

## 2025-07-16 RX ORDER — LANOLIN ALCOHOL/MO/W.PET/CERES
100 CREAM (GRAM) TOPICAL DAILY
COMMUNITY

## 2025-07-16 RX ORDER — ERGOCALCIFEROL 1.25 MG/1
1.25 CAPSULE ORAL WEEKLY
COMMUNITY
End: 2025-07-16 | Stop reason: SDUPTHER

## 2025-07-16 RX ORDER — MELATONIN 3 MG
CAPSULE ORAL
COMMUNITY

## 2025-07-16 RX ORDER — ERGOCALCIFEROL 1.25 MG/1
1.25 CAPSULE ORAL WEEKLY
Qty: 12 CAPSULE | Refills: 1 | Status: SHIPPED | OUTPATIENT
Start: 2025-07-16

## 2025-07-16 RX ORDER — OMEPRAZOLE 20 MG/1
20 CAPSULE, DELAYED RELEASE ORAL
COMMUNITY
End: 2025-07-16 | Stop reason: SDUPTHER

## 2025-07-16 ASSESSMENT — ENCOUNTER SYMPTOMS
ABDOMINAL PAIN: 0
HEADACHES: 0
BRUISES/BLEEDS EASILY: 0
ARTHRALGIAS: 0
SINUS PAIN: 0
FATIGUE: 0
WHEEZING: 0
BLOOD IN STOOL: 0
SORE THROAT: 0
COUGH: 0
BACK PAIN: 1
DIFFICULTY URINATING: 0
PALPITATIONS: 0
FEVER: 0
DIARRHEA: 0
UNEXPECTED WEIGHT CHANGE: 0
DIZZINESS: 0

## 2025-07-16 NOTE — PROGRESS NOTES
Subjective   Patient ID: Aamir Grossman is a 69 y.o. male who presents for ER Follow-up (Low blood pressure ).    - Patient seen in the emergency room for back pain workup was negative musculoskeletal pain  - Underlying extensive coronary artery disease on CT chest recent non-STEMI  Need to follow-up with Dr. Calderón  - Alcoholic dementia and worsening patient counseled about abstinence from driving  Patient counseled about alcohol rehab and to be admitted to [facility] patient had that before planning to go this week  -Refer patient to neurology due to underlying agitation dementia for further recommendation awaiting appointment on August  -Noncalcified lung nodule, abnormal CT chest lung nodule refer patient to pulmonary for further recommendation  - Hypothyroid slowly improving follow-up l thyroid function test today for further recommendation  - Elevated liver enzymes due to alcohol counts about alcohol abstinence  --History of CAD non-STEMI recently seen by cardiology cardiac workup stable  Patient reassured monitor blood pressure closely in 4 weeks  - Hypercholesterolemia continues atorvastatin 80 mg daily follow-up lipid profile in 3 months  -Psoriasis continue on Taltz per dermatology recommendation  -Reflux disease continue omeprazole 40 mg daily  -Screening for colon cancer obtained obtained in February 2020 4 repeat in 5 years 2029  --Hypothyroid compensated follow-up thyroid function continues current medication  -BPH compensated  - Follow-up 3 months    ER Follow-up  Pertinent negatives include no abdominal pain, arthralgias, chest pain, congestion, coughing, fatigue, fever, headaches, rash or sore throat.          Review of Systems   Constitutional:  Negative for fatigue, fever and unexpected weight change.   HENT:  Negative for congestion, ear discharge, ear pain, mouth sores, sinus pain and sore throat.    Eyes:  Negative for visual disturbance.   Respiratory:  Negative for cough and wheezing.   "  Cardiovascular:  Negative for chest pain, palpitations and leg swelling.   Gastrointestinal:  Negative for abdominal pain, blood in stool and diarrhea.   Genitourinary:  Negative for difficulty urinating.   Musculoskeletal:  Positive for back pain. Negative for arthralgias.   Skin:  Negative for rash.   Neurological:  Negative for dizziness and headaches.   Hematological:  Does not bruise/bleed easily.   Psychiatric/Behavioral:  Negative for behavioral problems.    All other systems reviewed and are negative.      Objective   No results found for: \"HGBA1C\"   /70   Pulse 66   Temp 36.6 °C (97.8 °F)   Wt 68.8 kg (151 lb 9.6 oz)   SpO2 98%   BMI 23.05 kg/m²     Physical Exam  Vitals and nursing note reviewed.   Constitutional:       Appearance: Normal appearance.   HENT:      Head: Normocephalic.      Nose: Nose normal.     Eyes:      Conjunctiva/sclera: Conjunctivae normal.      Pupils: Pupils are equal, round, and reactive to light.       Cardiovascular:      Rate and Rhythm: Regular rhythm.   Pulmonary:      Effort: Pulmonary effort is normal.      Breath sounds: Normal breath sounds.   Abdominal:      General: Abdomen is flat.      Palpations: Abdomen is soft.     Musculoskeletal:         General: Tenderness (Lumbosacral tenderness) present.      Cervical back: Neck supple.     Skin:     General: Skin is warm.     Neurological:      General: No focal deficit present.      Mental Status: He is oriented to person, place, and time.      Sensory: Sensory deficit present.     Psychiatric:         Mood and Affect: Mood normal.         Assessment/Plan   Aamir was seen today for er follow-up.  Diagnoses and all orders for this visit:  Coronary artery disease involving native coronary artery of native heart without angina pectoris (Primary)  -     Referral to Cardiology; Future  Vitamin D deficiency  -     ergocalciferol (Vitamin D-2) 1250 mcg (50,000 units) capsule; Take 1 capsule (1.25 mg) by mouth 1 (one) " time per week.  Gastroesophageal reflux disease without esophagitis  -     omeprazole (PriLOSEC) 20 mg DR capsule; Take 1 capsule (20 mg) by mouth once daily in the morning. Take before meals. Do not crush or chew.  Lung nodule  -     Referral to Pulmonology; Future  Vascular dementia without behavioral disturbance, psychotic disturbance, mood disturbance, or anxiety, unspecified dementia severity (Multi)  -     Cancel: Referral to Neurology; Future  Other orders  -     Follow Up In Primary Care - Established; Future    Patient seen in the emergency room for back pain workup was negative musculoskeletal pain  - Underlying extensive coronary artery disease on CT chest recent non-STEMI  Need to follow-up with Dr. Calderón  - Alcoholic dementia and worsening patient counseled about abstinence from driving  Patient counseled about alcohol rehab and to be admitted to [facility] patient had that before planning to go this week  -Refer patient to neurology due to underlying agitation dementia for further recommendation awaiting appointment on August  -Noncalcified lung nodule, abnormal CT chest lung nodule refer patient to pulmonary for further recommendation  - Hypothyroid slowly improving follow-up l thyroid function test today for further recommendation  - Elevated liver enzymes due to alcohol counts about alcohol abstinence  --History of CAD non-STEMI recently seen by cardiology cardiac workup stable  Patient reassured monitor blood pressure closely in 4 weeks  - Hypercholesterolemia continues atorvastatin 80 mg daily follow-up lipid profile in 3 months  -Psoriasis continue on Taltz per dermatology recommendation  -Reflux disease continue omeprazole 40 mg daily  -Screening for colon cancer obtained obtained in February 2020 4 repeat in 5 years 2029  --Hypothyroid compensated follow-up thyroid function continues current medication  -BPH compensated  - Follow-up 3 months

## 2025-09-02 DIAGNOSIS — I21.4 NSTEMI (NON-ST ELEVATED MYOCARDIAL INFARCTION) (MULTI): ICD-10-CM

## 2025-09-02 DIAGNOSIS — F32.5 DEPRESSION, MAJOR, IN REMISSION: ICD-10-CM

## 2025-09-02 RX ORDER — CITALOPRAM 20 MG/1
20 TABLET ORAL DAILY
Qty: 30 TABLET | Refills: 1 | Status: SHIPPED | OUTPATIENT
Start: 2025-09-02 | End: 2025-11-01

## 2025-09-02 RX ORDER — ATORVASTATIN CALCIUM 80 MG/1
80 TABLET, FILM COATED ORAL NIGHTLY
Qty: 30 TABLET | Refills: 2 | Status: SHIPPED | OUTPATIENT
Start: 2025-09-02 | End: 2025-12-01

## 2025-09-09 ENCOUNTER — APPOINTMENT (OUTPATIENT)
Dept: PRIMARY CARE | Facility: CLINIC | Age: 70
End: 2025-09-09
Payer: MEDICARE

## 2025-09-16 ENCOUNTER — APPOINTMENT (OUTPATIENT)
Dept: GASTROENTEROLOGY | Facility: CLINIC | Age: 70
End: 2025-09-16
Payer: MEDICARE

## 2025-10-07 ENCOUNTER — APPOINTMENT (OUTPATIENT)
Dept: PULMONOLOGY | Facility: CLINIC | Age: 70
End: 2025-10-07
Payer: MEDICARE

## 2025-10-09 ENCOUNTER — APPOINTMENT (OUTPATIENT)
Dept: PULMONOLOGY | Facility: CLINIC | Age: 70
End: 2025-10-09
Payer: MEDICARE

## 2025-10-20 ENCOUNTER — APPOINTMENT (OUTPATIENT)
Dept: NEUROLOGY | Facility: CLINIC | Age: 70
End: 2025-10-20
Payer: MEDICARE

## (undated) DEVICE — NEEDLE, MERIT ADVANCE, ONE WALL,  21GA X 7.0CM, STANDARD SMOOTH

## (undated) DEVICE — 5FR X 100CM INFINITI TL DIAGNOSTIC CATHETER, 3DRC, FEMORAL SELECTIVE THRULUMEN, WILLIAMS RIGHT OR NO TORQUE, 0.038IN MAX GUIDEWIRE (EA/1)

## (undated) DEVICE — CATHETER, ANGIO, IMPULSE, FL4, 6 FR X 100 CM

## (undated) DEVICE — MANIFOLD KIT, CUSTOM, GEAUGA

## (undated) DEVICE — CLOSURE DEVICE, VASCULAR, MYNX, 6FR/7FR

## (undated) DEVICE — ACCESS SYSTEM, PINNACLE PRECISION, 6FR X 10CM, ECHOGENIC NEEDLE

## (undated) DEVICE — ANGIO KIT, LEFT HEART, LF, CUSTOM